# Patient Record
Sex: FEMALE | Race: WHITE | NOT HISPANIC OR LATINO | Employment: OTHER | URBAN - METROPOLITAN AREA
[De-identification: names, ages, dates, MRNs, and addresses within clinical notes are randomized per-mention and may not be internally consistent; named-entity substitution may affect disease eponyms.]

---

## 2017-06-15 ENCOUNTER — GENERIC CONVERSION - ENCOUNTER (OUTPATIENT)
Dept: OTHER | Facility: OTHER | Age: 77
End: 2017-06-15

## 2018-01-16 NOTE — PROGRESS NOTES
Assessment    1  Mixed hyperlipidemia (272 2) (E78 2)   2  Chronic pancreatitis (577 1) (K86 1)   3  DDD (degenerative disc disease), lumbar (722 52) (M51 36)   4  OAB (overactive bladder) (596 51) (N32 81)   5  Tobacco use (305 1) (Z72 0)   6  Weight loss (783 21) (R63 4)   7  Acute URI (465 9) (J06 9)   8  Encounter for preventive health examination (V70 0) (Z00 00)    Plan  Acute URI    · Doxycycline Hyclate 100 MG Oral Capsule; TAKE 1 CAPSULE TWICE DAILY  UNTIL GONE  DDD (degenerative disc disease), lumbar    · Ibuprofen 600 MG Oral Tablet; TAKE 1 TABLET 3 times a day  Health Maintenance    · * MAMMO SCREENING BILATERAL W CAD; Status:Hold For - Scheduling; Requested  for:21Mar2016;   Mixed hyperlipidemia    · Atorvastatin Calcium 20 MG Oral Tablet; TAKE 1 TABLET DAILY AS DIRECTED  Mixed hyperlipidemia, Weight loss    · Follow-up visit in 1 year Evaluation and Treatment  Follow-up  Status: Hold For -  Scheduling  Requested for: 21Mar2016   · (1) CBC/PLT/DIFF; Status:Active; Requested for:21Mar2016;    · (1) COMPREHENSIVE METABOLIC PANEL; Status:Active; Requested for:21Mar2016;    · (1) LIPID PANEL, FASTING; Status:Active; Requested for:21Mar2016;    · (1) TSH; Status:Active; Requested for:21Mar2016;   OAB (overactive bladder)    · VESIcare 5 MG Oral Tablet    Discussion/Summary  health maintenance visit cervical cancer screening is not indicated Breast cancer screening: mammogram has been ordered  Colorectal cancer screening: colorectal cancer screening is current  She will be due for colonoscopy this year  Smoking cessation recommended  Chief Complaint  Patient is here for a yearly check up      History of Present Illness  HM, Adult Female: The patient is being seen for a health maintenance evaluation  The last health maintenance visit was 1 year(s) ago  Social History: Household members include spouse  She is   Work status: not currently employed  The patient is a current cigarette smoker  She is not ready to quit using tobacco  She reports occasional alcohol use  The patient has no concerns about alcohol abuse  She has never used illicit drugs  General Health: The patient's health since the last visit is described as fair  Lifestyle:  She has weight concerns  She uses tobacco  She consumes alcohol  She denies drug use  Reproductive health: the patient is postmenopausal    Screening: cancer screening reviewed and current  metabolic screening reviewed and current  risk screening reviewed and current  HPI: Patient comes in for annual checkup  She has had a three-week history of cough  She has lost 12 pounds last year she does not know why  She stopped taking desiccator because this was not helping her overactive bladder  Review of Systems    Constitutional: No fever, no chills, feels well, no tiredness, no recent weight gain or weight loss  Eyes: No complaints of eye pain, no red eyes, no eyesight problems, no discharge, no dry eyes, no itching of eyes  ENT: nasal discharge  Cardiovascular: No complaints of slow heart rate, no fast heart rate, no chest pain, no palpitations, no leg claudication, no lower extremity edema  Respiratory: cough  Gastrointestinal: abdominal pain  Genitourinary: as noted in HPI  Musculoskeletal: arthralgias  Integumentary: No complaints of skin rash or lesions, no itching, no skin wounds, no breast pain or lump  Psychiatric: Not suicidal, no sleep disturbance, no anxiety or depression, no change in personality, no emotional problems  Endocrine: No complaints of proptosis, no hot flashes, no muscle weakness, no deepening of the voice, no feelings of weakness  Hematologic/Lymphatic: No complaints of swollen glands, no swollen glands in the neck, does not bleed easily, does not bruise easily  Active Problems    1  Allergic rhinitis (477 9) (J30 9)   2  At risk for heart disease (V49 89) (Z91 89)   3  Chronic pain (115 29) (G27 29)   4  Chronic pancreatitis (577 1) (K86 1)   5  DDD (degenerative disc disease), lumbar (722 52) (M51 36)   6  Mixed hyperlipidemia (272 2) (E78 2)   7  OAB (overactive bladder) (596 51) (N32 81)   8  Tobacco use (305 1) (Z72 0)    Past Medical History    · History of Acute pancreatitis (577 0) (K85 9)   · History of Deviated nasal septum (470) (J34 2)   · History of colonoscopy (V45 89) (Z98 89)   · History of mammogram (V15 89) (Z92 89)   · History of Impaired fasting glucose (790 21) (R73 01)   · History of Increased frequency of urination (788 41) (R35 0)   · History of Neck pain (723 1) (M54 2)    Surgical History    · History of Appendectomy   · History of Cholecystectomy   · History of Exploratory Laparotomy   · History of Tubal Ligation    Family History    · Family history of Multiple myeloma    · Family history of CAD (coronary artery disease)   · Family history of CVA (cerebral vascular accident)    Social History    · Alcohol drinker   · Current every day smoker (305 1) (F17 200)   · Tobacco use (305 1) (Z72 0)    Current Meds   1  Atorvastatin Calcium 20 MG Oral Tablet; TAKE 1 TABLET DAILY AS DIRECTED; Therapy: 91LPK3096 to (Evaluate:35Yyq5958)  Requested for: 65ADB1348; Last   Rx:75Efl5493 Ordered   2  Fluticasone Propionate 50 MCG/ACT Nasal Suspension; USE 2 SPRAYS NASALLY   DAILY  Requested for: 23XNO1499; Last Rx:11Aio1540 Ordered   3  Hydrocodone-Acetaminophen 7 5-300 MG Oral Tablet; 1-2 q 6  h prn pain; Therapy: 98WMW6426 to (Last Rx:29Jan2016) Ordered   4  Ibuprofen 600 MG Oral Tablet; TAKE 1 TABLET 3 times a day; Therapy: 05CEG9910 to (Evaluate:16Jun2015)  Requested for: 75NGE4303; Last   Rx:16Hvb8568 Ordered   5  TraMADol HCl - 50 MG Oral Tablet; 2 q 6 h prn pain; Therapy: 24QXF5088 to (Last Rx:06Yvr7905) Ordered   6  VESIcare 5 MG Oral Tablet; 1 DAILY; Therapy: 39ILC4862 to (Last Rx:67Pqa2570)  Requested for: 28BUE1796 Ordered   7   Zenpep 02359 UNIT Oral Capsule Delayed Release Particles; 1 tid with meals; Therapy: 27TKV8584 to (Last Rx:98Pvt6957)  Requested for: 66Gxp3214 Ordered    Allergies    1  morphine    Vitals   Recorded: 21Mar2016 02:07PM   Heart Rate 78   Systolic 348   Diastolic 80   Height 5 ft 6 in   Weight 139 lb    BMI Calculated 22 44   BSA Calculated 1 71     Physical Exam    Constitutional   General appearance: No acute distress, well appearing and well nourished  Head and Face   Head and face: Normal     Palpation of the face and sinuses: No sinus tenderness  Eyes   Conjunctiva and lids: No swelling, erythema or discharge  Pupils and irises: Equal, round, reactive to light  Ears, Nose, Mouth, and Throat   External inspection of ears and nose: Normal     Otoscopic examination: Tympanic membranes translucent with normal light reflex  Canals patent without erythema  Hearing: Normal     Nasal mucosa, septum, and turbinates: Normal without edema or erythema  Lips, teeth, and gums: Normal, good dentition  Oropharynx: Abnormal   The posterior pharynx was erythematous  Neck   Neck: Supple, symmetric, trachea midline, no masses  Thyroid: Normal, no thyromegaly  Pulmonary   Respiratory effort: No increased work of breathing or signs of respiratory distress  Auscultation of lungs: Clear to auscultation  Cardiovascular   Auscultation of heart: Normal rate and rhythm, normal S1 and S2, no murmurs  Carotid pulses: 2+ bilaterally  Abdominal aorta: Normal     Chest   Chest: Normal     Abdomen   Abdomen: Non-tender, no masses  Liver and spleen: No hepatomegaly or splenomegaly  Lymphatic   Palpation of lymph nodes in neck: No lymphadenopathy  Palpation of lymph nodes in other areas: No lymphadenopathy  Musculoskeletal   Gait and station: Normal     Digits and nails: Normal without clubbing or cyanosis      Joints, bones, and muscles: Normal     Range of motion: Normal     Stability: Normal     Muscle strength/tone: Normal     Skin   Skin and subcutaneous tissue: Normal without rashes or lesions  Neurologic   Cranial nerves: Cranial nerves II-XII intact  Cortical function: Normal mental status  Reflexes: 2+ and symmetric  Sensation: No sensory loss  Psychiatric   Judgment and insight: Normal     Orientation to person, place, and time: Normal     Recent and remote memory: Intact  Mood and affect: Normal        Health Management  Health Maintenance   Medicare Annual Wellness Visit; every 1 year; Last 64PCR5820; Next Due: 04BRE1898;   Overdue    Signatures   Electronically signed by : PATRICK Chang ; Mar 21 2016  2:31PM EST                       (Author)

## 2018-02-02 ENCOUNTER — TELEPHONE (OUTPATIENT)
Dept: FAMILY MEDICINE CLINIC | Facility: CLINIC | Age: 78
End: 2018-02-02

## 2018-02-02 DIAGNOSIS — G89.4 CHRONIC PAIN SYNDROME: Primary | ICD-10-CM

## 2018-02-02 RX ORDER — HYDROCODONE BITARTRATE AND ACETAMINOPHEN 7.5; 3 MG/1; MG/1
1 TABLET ORAL EVERY 6 HOURS PRN
Qty: 60 TABLET | Refills: 0 | Status: SHIPPED | OUTPATIENT
Start: 2018-02-02 | End: 2018-03-28 | Stop reason: SDUPTHER

## 2018-03-15 RX ORDER — ATORVASTATIN CALCIUM 20 MG/1
1 TABLET, FILM COATED ORAL DAILY
COMMUNITY
Start: 2014-12-11 | End: 2018-03-20 | Stop reason: SDUPTHER

## 2018-03-15 RX ORDER — FLUTICASONE PROPIONATE 50 MCG
2 SPRAY, SUSPENSION (ML) NASAL DAILY
COMMUNITY
End: 2022-01-01 | Stop reason: CLARIF

## 2018-03-15 RX ORDER — IBUPROFEN 600 MG/1
1 TABLET ORAL 3 TIMES DAILY
COMMUNITY
Start: 2014-12-11 | End: 2018-03-20 | Stop reason: SDUPTHER

## 2018-03-15 RX ORDER — TRAMADOL HYDROCHLORIDE 50 MG/1
TABLET ORAL
COMMUNITY
Start: 2015-05-11 | End: 2018-03-28

## 2018-03-20 ENCOUNTER — OFFICE VISIT (OUTPATIENT)
Dept: FAMILY MEDICINE CLINIC | Facility: CLINIC | Age: 78
End: 2018-03-20
Payer: MEDICARE

## 2018-03-20 VITALS
WEIGHT: 144 LBS | DIASTOLIC BLOOD PRESSURE: 86 MMHG | TEMPERATURE: 98.6 F | HEART RATE: 75 BPM | OXYGEN SATURATION: 96 % | HEIGHT: 67 IN | BODY MASS INDEX: 22.6 KG/M2 | SYSTOLIC BLOOD PRESSURE: 152 MMHG

## 2018-03-20 DIAGNOSIS — K86.1 CHRONIC PANCREATITIS, UNSPECIFIED PANCREATITIS TYPE (HCC): ICD-10-CM

## 2018-03-20 DIAGNOSIS — Z78.0 POST-MENOPAUSE: ICD-10-CM

## 2018-03-20 DIAGNOSIS — Z72.0 TOBACCO USE: ICD-10-CM

## 2018-03-20 DIAGNOSIS — E78.2 MIXED HYPERLIPIDEMIA: ICD-10-CM

## 2018-03-20 DIAGNOSIS — R73.01 IMPAIRED FASTING GLUCOSE: ICD-10-CM

## 2018-03-20 DIAGNOSIS — M51.36 DDD (DEGENERATIVE DISC DISEASE), LUMBAR: ICD-10-CM

## 2018-03-20 DIAGNOSIS — Z00.00 MEDICARE ANNUAL WELLNESS VISIT, SUBSEQUENT: ICD-10-CM

## 2018-03-20 DIAGNOSIS — I10 ESSENTIAL HYPERTENSION: Primary | ICD-10-CM

## 2018-03-20 PROCEDURE — G0439 PPPS, SUBSEQ VISIT: HCPCS | Performed by: FAMILY MEDICINE

## 2018-03-20 RX ORDER — ATORVASTATIN CALCIUM 20 MG/1
20 TABLET, FILM COATED ORAL DAILY
Qty: 90 TABLET | Refills: 3 | Status: SHIPPED | OUTPATIENT
Start: 2018-03-20 | End: 2019-01-01 | Stop reason: SDUPTHER

## 2018-03-20 RX ORDER — LOSARTAN POTASSIUM 50 MG/1
50 TABLET ORAL DAILY
Qty: 90 TABLET | Refills: 3 | Status: SHIPPED | OUTPATIENT
Start: 2018-03-20 | End: 2019-01-01 | Stop reason: SDUPTHER

## 2018-03-20 RX ORDER — IBUPROFEN 600 MG/1
600 TABLET ORAL 3 TIMES DAILY
Qty: 270 TABLET | Refills: 3 | Status: SHIPPED | OUTPATIENT
Start: 2018-03-20 | End: 2019-01-01 | Stop reason: SDUPTHER

## 2018-03-20 NOTE — PROGRESS NOTES
HPI:  Leah Bowen is a 78 y o  female here for her Subsequent Wellness Visit      Patient Active Problem List   Diagnosis    DDD (degenerative disc disease), lumbar    Chronic pancreatitis ([address] )    Mixed hyperlipidemia    Allergic rhinitis    Impaired fasting glucose    Essential hypertension    Tobacco use     Past Medical History:   Diagnosis Date    Deviated nasal septum     H/O colonoscopy     6/7/10    Impaired fasting glucose      Past Surgical History:   Procedure Laterality Date    APPENDECTOMY      CHOLECYSTECTOMY      EXPLORATORY LAPAROTOMY      TUBAL LIGATION       Family History   Problem Relation Age of Onset    Multiple myeloma Mother     Coronary artery disease Father     Stroke Father      CVA     History   Smoking Status    Current Every Day Smoker   Smokeless Tobacco    Never Used     Comment: Tobacco use     History   Alcohol Use    Yes      History   Drug Use No     /86   Pulse 75   Temp 98 6 °F (37 °C)   Ht 5' 7" (1 702 m)   Wt 65 3 kg (144 lb)   SpO2 96%   BMI 22 55 kg/m²       Current Outpatient Prescriptions   Medication Sig Dispense Refill    atorvastatin (LIPITOR) 20 mg tablet Take 1 tablet (20 mg total) by mouth daily 90 tablet 3    fluticasone (FLONASE) 50 mcg/act nasal spray 2 sprays into each nostril daily      ibuprofen (MOTRIN) 600 mg tablet Take 1 tablet (600 mg total) by mouth 3 (three) times a day 270 tablet 3    pancrelipase, Lip-Prot-Amyl, (ZENPEP) 39425 units CPEP capsule Take 1 capsule (15,000 units of lipase total) by mouth 3 (three) times a day with meals 270 capsule 3    traMADol (ULTRAM) 50 mg tablet Take by mouth      HYDROcodone-acetaminophen (XODOL) 7 5-300 MG per tablet Take 1 tablet by mouth every 6 (six) hours as needed for moderate pain Max Daily Amount: 4 tablets 60 tablet 0    losartan (COZAAR) 50 mg tablet Take 1 tablet (50 mg total) by mouth daily 90 tablet 3     No current facility-administered medications for this visit  Allergies   Allergen Reactions    Morphine      Immunization History   Administered Date(s) Administered    Influenza Split High Dose Preservative Free IM 11/01/2016    Pneumococcal Polysaccharide PPV23 11/01/2016       Patient Care Team:  Laine Ayala MD as PCP - General      Medicare Screening Tests and Risk Assessments:  AWV Clinical     ISAR:   Previous hospitalizations?:  No       Once in a Lifetime Medicare Screening:       Medicare Screening Tests and Risk Assessment:   AAA Risk Assessment    Osteoporosis Risk Assessment    HIV Risk Assessment        Drug and Alcohol Use:   Tobacco use    Tobacco use duration    Tobacco Cessation Readiness    Alcohol use    Alcohol Treatment Readiness   Illicit Drug Use        Diet & Exercise:   Diet   What is your diet?:  Regular   How many servings a day of the following:   Exercise    Do you currently exercise?:  yes    Frequency:  daily    Type of exercise:  walking, cardio       Cognitive Impairment Screening:   Depression screening preformed:  No    Cognitive Impairment Screening    Do you have difficulty learning or retaining new information?:  No Do you have difficulty handling new tasks?:  No   Do you have difficulty with reasoning?:  No Do you have difficulty with spatial ability and orientation?:  No   Do you have difficulty with language?:  No Do you have difficulty with behavior?:  No       Functional Ability/Level of Safety:   Hearing    Hearing Impairment Assessment    Current Activities    Help needed with the folllowing:    ADL    Fall Risk   Injury History       Home Safety:   Home Safety Risk Factors       Advanced Directives:   Advanced Directives    Living Will:  No Durable POA for healthcare:   Yes   Advanced directive:  Yes    Patient's End of Life Decisions        Urinary Incontinence:   Do you have urinary incontinence?:  Yes Do you have incomplete emptying?:  No   Do you urinate frequently?:  No Do you have urinary urgency?:  No   Do you have urinary hesitancy?:  No Do you have dysuria (painful and/or difficult urination)?:  No   Do you have nocturia (waking up to urinate)?:  Yes Do you strain when urinating (have to push to urinate)?:  No   Do you have a weak stream when urinating?:  No Do you have intermittent streaming when urinating?:  No   Do you dribble urine after finishing?:  No    Do you have vaginal pressure?:  No Do you have vaginal dryness?:  No       Glaucoma:            Provider Screening     Preventative Screening/Counseling:   Cardiovascular Screening/Counseling:   (Labs Q5 years, EKG optional one-time)   General:  Screening Current           Diabetes Screening/Counseling:   (2 tests/year if Pre-Diabetes or 1 test/year if no Diabetes)   General:  Risks and Benefits Discussed           Colorectal Cancer Screening/Counseling:   (FOBT Q1 yr; Flex Sig Q4 yrs or Q10 yrs after Screening Colonoscopy; Screening Colonoscpy Q2 yrs High Risk or Q10 yrs Low Risk; Barium Enema Q2 yrs High Risk or Q4 yrs Low Risk)   General:  Patient Declines, Risks and Benefits Discussed           Prostate Cancer Screening/Counseling:   (Annual)          Breast Cancer Screening/Counseling:   (Baseline Age 28 - 43; Annual Age 36+)   General:  Screening Current          Cervical Cancer Screening/Counseling:   (Annual for High Risk or Childbearing Age with Abnormal Pap in Last 3 yrs; Every 2 all others)   General:  Screening Not Indicated           Osteoporosis Screening/Counseling:   (Every 2 Yrs if at risk or more if medically necessary)   General:  Risks and Benefits Discussed  Due for: Studies:  DXA Axial         AAA Screening/Counseling:   (Once per Lifetime with risk factors)    General:  Screening Not Indicated           Glaucoma Screening/Counseling:   (Annual)   General:  Screening Current          HIV Screening/Counseling:   (Voluntary;  Once annually for high risk OR 3 times for Pregnancy at diagnosis of IUP; 3rd trimester; and at Labor   General: Screening Not Indicated           Hepatitis C Screening:   Hepatitis C Counseling Provided:  Yes Hepatitis C Screening Accepted: Yes              Immunizations: Other Preventative Couseling (Non-Medicare Wellness Visit Required):       Referrals (Non-Medicare Wellness Visit Required):       Medical Equipment/Suppliers:           No exam data present  Reviewed Updated [facility] Prior Wellness Visits:   Last Medicare wellness visit information was reviewed, patient interviewed , no change since last AWVyes  Last Medicare wellness visit information was reviewed, patient interviewed and updates made to the record today yes    Assessment and Plan:  1  Essential hypertension  losartan (COZAAR) 50 mg tablet   2  Post-menopause  DXA bone density spine hip and pelvis   3  Chronic pancreatitis, unspecified pancreatitis type (HCC)  pancrelipase, Lip-Prot-Amyl, (ZENPEP) 21256 units CPEP capsule    CBC and differential   4  Mixed hyperlipidemia  atorvastatin (LIPITOR) 20 mg tablet    Comprehensive metabolic panel    Lipid panel   5  DDD (degenerative disc disease), lumbar  ibuprofen (MOTRIN) 600 mg tablet   6  Impaired fasting glucose  HEMOGLOBIN A1C W/ EAG ESTIMATION   7   Tobacco use         Health Maintenance Due   Topic Date Due    GLAUCOMA SCREENING 67+ YR  08/08/2009

## 2018-03-20 NOTE — PROGRESS NOTES
Assessment/Plan:           Problem List Items Addressed This Visit     DDD (degenerative disc disease), lumbar    Relevant Medications    ibuprofen (MOTRIN) 600 mg tablet    Chronic pancreatitis (HCC)    Relevant Medications    pancrelipase, Lip-Prot-Amyl, (ZENPEP) 96184 units CPEP capsule    Other Relevant Orders    CBC and differential    Mixed hyperlipidemia    Relevant Medications    atorvastatin (LIPITOR) 20 mg tablet    Other Relevant Orders    Comprehensive metabolic panel    Lipid panel    Impaired fasting glucose    Relevant Orders    HEMOGLOBIN A1C W/ EAG ESTIMATION    Essential hypertension - Primary    Relevant Medications    losartan (COZAAR) 50 mg tablet    Tobacco use      Other Visit Diagnoses     Post-menopause        Relevant Orders    DXA bone density spine hip and pelvis    Medicare annual wellness visit, subsequent                Subjective:      Patient ID: Marlin Lucia is a 78 y o  female  Patient comes in for a checkup  She has not been here in over 1 year  She did not get blood work done requested last year  Medication Refill   Associated symptoms include abdominal pain  The following portions of the patient's history were reviewed and updated as appropriate: allergies, current medications, past family history, past medical history, past social history, past surgical history and problem list     Review of Systems   Constitutional: Negative  HENT: Negative  Eyes: Negative  Respiratory: Negative  Cardiovascular: Negative  Gastrointestinal: Positive for abdominal pain  Endocrine: Negative  Genitourinary: Negative  Musculoskeletal: Negative  Allergic/Immunologic: Negative  Neurological: Negative  Psychiatric/Behavioral: Negative            Objective:      /86   Pulse 75   Temp 98 6 °F (37 °C)   Ht 5' 7" (1 702 m)   Wt 65 3 kg (144 lb)   SpO2 96%   BMI 22 55 kg/m²          Physical Exam   Constitutional: She is oriented to person, place, and time  She appears well-developed  HENT:   Head: Normocephalic and atraumatic  Mouth/Throat: Oropharynx is clear and moist    Eyes: EOM are normal  Pupils are equal, round, and reactive to light  Neck: Neck supple  No thyromegaly present  Cardiovascular: Normal rate, regular rhythm and normal heart sounds  Pulmonary/Chest: Effort normal and breath sounds normal    Abdominal: Soft  Musculoskeletal: Normal range of motion  Lymphadenopathy:     She has no cervical adenopathy  Neurological: She is alert and oriented to person, place, and time  Skin: Skin is warm and dry  Psychiatric: She has a normal mood and affect  Nursing note and vitals reviewed

## 2018-03-28 DIAGNOSIS — G89.4 CHRONIC PAIN SYNDROME: ICD-10-CM

## 2018-03-28 RX ORDER — HYDROCODONE BITARTRATE AND ACETAMINOPHEN 7.5; 3 MG/1; MG/1
1 TABLET ORAL EVERY 6 HOURS PRN
Qty: 60 TABLET | Refills: 0 | Status: SHIPPED | OUTPATIENT
Start: 2018-03-28 | End: 2018-05-01

## 2018-03-30 DIAGNOSIS — K86.1 CHRONIC PANCREATITIS, UNSPECIFIED PANCREATITIS TYPE (HCC): ICD-10-CM

## 2018-04-02 DIAGNOSIS — K86.1 CHRONIC PANCREATITIS, UNSPECIFIED PANCREATITIS TYPE (HCC): ICD-10-CM

## 2018-04-09 ENCOUNTER — HOSPITAL ENCOUNTER (OUTPATIENT)
Dept: BONE DENSITY | Facility: CLINIC | Age: 78
Discharge: HOME | End: 2018-04-09
Payer: MEDICARE

## 2018-04-09 DIAGNOSIS — Z78.0 POST-MENOPAUSE: ICD-10-CM

## 2018-04-09 PROCEDURE — 77080 DXA BONE DENSITY AXIAL: CPT

## 2018-04-10 DIAGNOSIS — M81.0 OSTEOPOROSIS, UNSPECIFIED OSTEOPOROSIS TYPE, UNSPECIFIED PATHOLOGICAL FRACTURE PRESENCE: Primary | ICD-10-CM

## 2018-04-10 RX ORDER — RISEDRONATE SODIUM 35 MG/1
35 TABLET, FILM COATED ORAL
Qty: 12 TABLET | Refills: 5 | Status: SHIPPED | OUTPATIENT
Start: 2018-04-10 | End: 2018-04-12

## 2018-04-11 ENCOUNTER — TELEPHONE (OUTPATIENT)
Dept: FAMILY MEDICINE CLINIC | Facility: CLINIC | Age: 78
End: 2018-04-11

## 2018-04-12 DIAGNOSIS — M81.0 OSTEOPOROSIS, UNSPECIFIED OSTEOPOROSIS TYPE, UNSPECIFIED PATHOLOGICAL FRACTURE PRESENCE: Primary | ICD-10-CM

## 2018-04-12 RX ORDER — ALENDRONATE SODIUM 70 MG/1
70 TABLET ORAL
Qty: 12 TABLET | Refills: 5 | Status: SHIPPED | OUTPATIENT
Start: 2018-04-12 | End: 2019-05-06

## 2018-04-19 LAB — HBA1C MFR BLD HPLC: 5.6 %

## 2018-05-01 ENCOUNTER — OFFICE VISIT (OUTPATIENT)
Dept: FAMILY MEDICINE CLINIC | Facility: CLINIC | Age: 78
End: 2018-05-01
Payer: MEDICARE

## 2018-05-01 VITALS
HEIGHT: 67 IN | SYSTOLIC BLOOD PRESSURE: 138 MMHG | DIASTOLIC BLOOD PRESSURE: 72 MMHG | WEIGHT: 145.4 LBS | HEART RATE: 89 BPM | OXYGEN SATURATION: 95 % | BODY MASS INDEX: 22.82 KG/M2

## 2018-05-01 DIAGNOSIS — I10 ESSENTIAL HYPERTENSION: ICD-10-CM

## 2018-05-01 DIAGNOSIS — K86.1 CHRONIC PANCREATITIS, UNSPECIFIED PANCREATITIS TYPE (HCC): ICD-10-CM

## 2018-05-01 DIAGNOSIS — M81.0 OSTEOPOROSIS, UNSPECIFIED OSTEOPOROSIS TYPE, UNSPECIFIED PATHOLOGICAL FRACTURE PRESENCE: ICD-10-CM

## 2018-05-01 DIAGNOSIS — R73.01 IMPAIRED FASTING GLUCOSE: ICD-10-CM

## 2018-05-01 DIAGNOSIS — Z72.0 TOBACCO USE: ICD-10-CM

## 2018-05-01 DIAGNOSIS — J30.9 ALLERGIC RHINITIS, UNSPECIFIED SEASONALITY, UNSPECIFIED TRIGGER: ICD-10-CM

## 2018-05-01 DIAGNOSIS — E78.2 MIXED HYPERLIPIDEMIA: ICD-10-CM

## 2018-05-01 DIAGNOSIS — M51.36 DDD (DEGENERATIVE DISC DISEASE), LUMBAR: Primary | ICD-10-CM

## 2018-05-01 PROCEDURE — 99214 OFFICE O/P EST MOD 30 MIN: CPT | Performed by: FAMILY MEDICINE

## 2018-05-01 RX ORDER — HYDROCODONE BITARTRATE AND ACETAMINOPHEN 7.5; 325 MG/1; MG/1
TABLET ORAL
Qty: 180 TABLET | Refills: 0 | Status: SHIPPED | OUTPATIENT
Start: 2018-05-01 | End: 2018-11-06 | Stop reason: SDUPTHER

## 2018-05-01 RX ORDER — TRAMADOL HYDROCHLORIDE 50 MG/1
TABLET ORAL
Refills: 0 | COMMUNITY
Start: 2018-04-26 | End: 2018-11-06 | Stop reason: SDUPTHER

## 2018-05-01 NOTE — PROGRESS NOTES
Assessment/Plan:           Problem List Items Addressed This Visit     DDD (degenerative disc disease), lumbar - Primary    Relevant Medications    HYDROcodone-acetaminophen (NORCO) 7 5-325 mg per tablet    Chronic pancreatitis ([address] )    Mixed hyperlipidemia       Continue Lipitor         Allergic rhinitis    Impaired fasting glucose      Low carb diet         Essential hypertension      Continue losartan         Tobacco use      Smoking cessation recommended         Osteoporosis            Subjective:      Patient ID: Naomi Stephens is a 78 y o  female  Patient comes in for a checkup,  Review of her labs and recent DEXA scan  We have started her on  Fosamax for her osteoporosis  She is tolerating this well   she complains of low back pain  The following portions of the patient's history were reviewed and updated as appropriate: allergies, current medications, past family history, past medical history, past social history, past surgical history and problem list     Review of Systems   Constitutional: Negative  HENT: Negative  Respiratory: Negative  Cardiovascular: Negative  Gastrointestinal: Negative  Objective:      /72   Pulse 89   Ht 5' 7" (1 702 m)   Wt 66 kg (145 lb 6 4 oz)   SpO2 95%   BMI 22 77 kg/m²          Physical Exam   Constitutional: She is oriented to person, place, and time  She appears well-developed  HENT:   Head: Normocephalic and atraumatic  Mouth/Throat: Oropharynx is clear and moist    Eyes: EOM are normal  Pupils are equal, round, and reactive to light  Neck: Neck supple  No thyromegaly present  Cardiovascular: Normal rate, regular rhythm and normal heart sounds  Pulmonary/Chest: Effort normal and breath sounds normal    Abdominal: Soft  Musculoskeletal: Normal range of motion  Lymphadenopathy:     She has no cervical adenopathy  Neurological: She is alert and oriented to person, place, and time  Skin: Skin is warm and dry  Psychiatric: She has a normal mood and affect  Nursing note and vitals reviewed

## 2018-05-14 ENCOUNTER — TELEPHONE (OUTPATIENT)
Dept: FAMILY MEDICINE CLINIC | Facility: CLINIC | Age: 78
End: 2018-05-14

## 2018-05-14 NOTE — TELEPHONE ENCOUNTER
Pt is taking Fosamax -  is asking if taking that will have any effect on her having a tooth pulled?- call her or him back

## 2018-11-06 ENCOUNTER — OFFICE VISIT (OUTPATIENT)
Dept: FAMILY MEDICINE CLINIC | Facility: CLINIC | Age: 78
End: 2018-11-06
Payer: MEDICARE

## 2018-11-06 VITALS
BODY MASS INDEX: 22.29 KG/M2 | HEIGHT: 67 IN | RESPIRATION RATE: 14 BRPM | DIASTOLIC BLOOD PRESSURE: 82 MMHG | HEART RATE: 80 BPM | SYSTOLIC BLOOD PRESSURE: 134 MMHG | OXYGEN SATURATION: 96 % | WEIGHT: 142 LBS | TEMPERATURE: 98 F

## 2018-11-06 DIAGNOSIS — I10 ESSENTIAL HYPERTENSION: ICD-10-CM

## 2018-11-06 DIAGNOSIS — K86.1 CHRONIC PANCREATITIS, UNSPECIFIED PANCREATITIS TYPE (HCC): ICD-10-CM

## 2018-11-06 DIAGNOSIS — M51.36 DDD (DEGENERATIVE DISC DISEASE), LUMBAR: ICD-10-CM

## 2018-11-06 DIAGNOSIS — Z23 NEED FOR IMMUNIZATION AGAINST INFLUENZA: ICD-10-CM

## 2018-11-06 DIAGNOSIS — Z72.0 TOBACCO USE: ICD-10-CM

## 2018-11-06 DIAGNOSIS — R73.01 IMPAIRED FASTING GLUCOSE: Primary | ICD-10-CM

## 2018-11-06 DIAGNOSIS — E78.2 MIXED HYPERLIPIDEMIA: ICD-10-CM

## 2018-11-06 DIAGNOSIS — Z23 NEED FOR VACCINATION WITH 13-POLYVALENT PNEUMOCOCCAL CONJUGATE VACCINE: ICD-10-CM

## 2018-11-06 DIAGNOSIS — M81.0 OSTEOPOROSIS, UNSPECIFIED OSTEOPOROSIS TYPE, UNSPECIFIED PATHOLOGICAL FRACTURE PRESENCE: ICD-10-CM

## 2018-11-06 PROCEDURE — G0008 ADMIN INFLUENZA VIRUS VAC: HCPCS

## 2018-11-06 PROCEDURE — G0009 ADMIN PNEUMOCOCCAL VACCINE: HCPCS

## 2018-11-06 PROCEDURE — 90662 IIV NO PRSV INCREASED AG IM: CPT

## 2018-11-06 PROCEDURE — 90670 PCV13 VACCINE IM: CPT

## 2018-11-06 PROCEDURE — 99214 OFFICE O/P EST MOD 30 MIN: CPT | Performed by: FAMILY MEDICINE

## 2018-11-06 RX ORDER — TRAMADOL HYDROCHLORIDE 50 MG/1
TABLET ORAL
Qty: 100 TABLET | Refills: 5 | Status: SHIPPED | OUTPATIENT
Start: 2018-11-06 | End: 2019-05-06 | Stop reason: SDUPTHER

## 2018-11-06 RX ORDER — HYDROCODONE BITARTRATE AND ACETAMINOPHEN 7.5; 325 MG/1; MG/1
TABLET ORAL
Qty: 180 TABLET | Refills: 0 | Status: SHIPPED | OUTPATIENT
Start: 2018-11-06 | End: 2019-05-06 | Stop reason: SDUPTHER

## 2018-11-06 NOTE — PROGRESS NOTES
Assessment/Plan:    Return visit in 6 months with fasting blood work prior to visit  Mammogram and colonoscopy are refused       Problem List Items Addressed This Visit     DDD (degenerative disc disease), lumbar    Relevant Medications    HYDROcodone-acetaminophen (NORCO) 7 5-325 mg per tablet    traMADol (ULTRAM) 50 mg tablet    Chronic pancreatitis (HCC)     Continue Pancrease with meals         Mixed hyperlipidemia     Continue Lipitor 20 mg daily         Relevant Orders    CBC and differential    Comprehensive metabolic panel    Lipid panel    Impaired fasting glucose - Primary     Low carb diet         Relevant Orders    Hemoglobin A1C    Essential hypertension     Continue losartan 50 mg daily         Tobacco use     Smoking cessation recommended         Osteoporosis      Other Visit Diagnoses     Need for immunization against influenza        Relevant Orders    influenza vaccine, [id number], high-dose, PF 0 5 mL, for patients 65 yr+ (FLUZONE HIGH-DOSE) (Completed)    Need for vaccination with 13-polyvalent pneumococcal conjugate vaccine        Relevant Orders    PNEUMOCOCCAL CONJUGATE VACCINE 13-VALENT GREATER THAN 6 MONTHS (Completed)            Subjective:      Patient ID: Naheed Hill is a 70 y o  female  Patient comes in for checkup  She complains of worsening of her low back pain  She is attempting to quit smoking  The following portions of the patient's history were reviewed and updated as appropriate: allergies, current medications, past family history, past medical history, past social history, past surgical history and problem list     Review of Systems   Constitutional: Negative  HENT: Negative  Respiratory: Negative  Cardiovascular: Negative            Objective:      /82   Pulse 80   Temp 98 °F (36 7 °C)   Resp 14   Ht 5' 7" (1 702 m)   Wt 64 4 kg (142 lb)   SpO2 96%   BMI 22 24 kg/m²          Physical Exam   Constitutional: She is oriented to person, place, and time  She appears well-developed  HENT:   Head: Normocephalic and atraumatic  Mouth/Throat: Oropharynx is clear and moist    Eyes: Pupils are equal, round, and reactive to light  EOM are normal    Neck: Neck supple  Cardiovascular: Normal rate, regular rhythm and normal heart sounds  Pulmonary/Chest: Effort normal and breath sounds normal    Abdominal: Soft  Musculoskeletal: Normal range of motion  Neurological: She is alert and oriented to person, place, and time  Skin: Skin is warm and dry  Psychiatric: She has a normal mood and affect

## 2019-01-01 DIAGNOSIS — E78.2 MIXED HYPERLIPIDEMIA: ICD-10-CM

## 2019-01-01 DIAGNOSIS — M51.36 DDD (DEGENERATIVE DISC DISEASE), LUMBAR: ICD-10-CM

## 2019-01-01 DIAGNOSIS — I10 ESSENTIAL HYPERTENSION: ICD-10-CM

## 2019-01-02 RX ORDER — LOSARTAN POTASSIUM 50 MG/1
TABLET ORAL
Qty: 90 TABLET | Refills: 3 | Status: SHIPPED | OUTPATIENT
Start: 2019-01-02 | End: 2020-04-17 | Stop reason: SDUPTHER

## 2019-01-02 RX ORDER — ATORVASTATIN CALCIUM 20 MG/1
TABLET, FILM COATED ORAL
Qty: 90 TABLET | Refills: 3 | Status: SHIPPED | OUTPATIENT
Start: 2019-01-02 | End: 2020-04-17 | Stop reason: SDUPTHER

## 2019-01-02 RX ORDER — IBUPROFEN 600 MG/1
TABLET ORAL
Qty: 270 TABLET | Refills: 3 | Status: SHIPPED | OUTPATIENT
Start: 2019-01-02 | End: 2019-11-05 | Stop reason: SDUPTHER

## 2019-04-16 LAB — HBA1C MFR BLD HPLC: 5.8 %

## 2019-05-06 ENCOUNTER — OFFICE VISIT (OUTPATIENT)
Dept: FAMILY MEDICINE CLINIC | Facility: CLINIC | Age: 79
End: 2019-05-06
Payer: MEDICARE

## 2019-05-06 VITALS
BODY MASS INDEX: 22.91 KG/M2 | WEIGHT: 146 LBS | RESPIRATION RATE: 16 BRPM | HEART RATE: 93 BPM | TEMPERATURE: 97.5 F | HEIGHT: 67 IN | DIASTOLIC BLOOD PRESSURE: 66 MMHG | SYSTOLIC BLOOD PRESSURE: 114 MMHG

## 2019-05-06 DIAGNOSIS — M81.0 OSTEOPOROSIS, UNSPECIFIED OSTEOPOROSIS TYPE, UNSPECIFIED PATHOLOGICAL FRACTURE PRESENCE: ICD-10-CM

## 2019-05-06 DIAGNOSIS — Z00.00 MEDICARE ANNUAL WELLNESS VISIT, SUBSEQUENT: Primary | ICD-10-CM

## 2019-05-06 DIAGNOSIS — R73.01 IMPAIRED FASTING GLUCOSE: ICD-10-CM

## 2019-05-06 DIAGNOSIS — E78.2 MIXED HYPERLIPIDEMIA: ICD-10-CM

## 2019-05-06 DIAGNOSIS — Z72.0 TOBACCO USE: ICD-10-CM

## 2019-05-06 DIAGNOSIS — K86.1 CHRONIC PANCREATITIS, UNSPECIFIED PANCREATITIS TYPE (HCC): ICD-10-CM

## 2019-05-06 DIAGNOSIS — I10 ESSENTIAL HYPERTENSION: ICD-10-CM

## 2019-05-06 DIAGNOSIS — M51.36 DDD (DEGENERATIVE DISC DISEASE), LUMBAR: ICD-10-CM

## 2019-05-06 PROCEDURE — 99214 OFFICE O/P EST MOD 30 MIN: CPT | Performed by: FAMILY MEDICINE

## 2019-05-06 PROCEDURE — G0439 PPPS, SUBSEQ VISIT: HCPCS | Performed by: FAMILY MEDICINE

## 2019-05-06 RX ORDER — TRAMADOL HYDROCHLORIDE 50 MG/1
TABLET ORAL
Qty: 100 TABLET | Refills: 5 | Status: SHIPPED | OUTPATIENT
Start: 2019-05-06 | End: 2019-11-05 | Stop reason: SDUPTHER

## 2019-05-06 RX ORDER — HYDROCODONE BITARTRATE AND ACETAMINOPHEN 7.5; 325 MG/1; MG/1
TABLET ORAL
Qty: 180 TABLET | Refills: 0 | Status: SHIPPED | OUTPATIENT
Start: 2019-05-06 | End: 2019-08-09 | Stop reason: SDUPTHER

## 2019-08-09 DIAGNOSIS — M51.36 DDD (DEGENERATIVE DISC DISEASE), LUMBAR: ICD-10-CM

## 2019-08-09 RX ORDER — HYDROCODONE BITARTRATE AND ACETAMINOPHEN 7.5; 325 MG/1; MG/1
TABLET ORAL
Qty: 180 TABLET | Refills: 0 | Status: SHIPPED | OUTPATIENT
Start: 2019-08-09 | End: 2019-11-05 | Stop reason: SDUPTHER

## 2019-08-09 NOTE — TELEPHONE ENCOUNTER
rf Hydrocodone   PDMP  05/06/2019  1  05/06/2019  HYDROCODONE-ACETAMIN 7 5-325  180 0  30     [facility]

## 2019-11-05 ENCOUNTER — OFFICE VISIT (OUTPATIENT)
Dept: FAMILY MEDICINE CLINIC | Facility: CLINIC | Age: 79
End: 2019-11-05
Payer: MEDICARE

## 2019-11-05 VITALS
DIASTOLIC BLOOD PRESSURE: 70 MMHG | SYSTOLIC BLOOD PRESSURE: 112 MMHG | TEMPERATURE: 98.4 F | OXYGEN SATURATION: 97 % | RESPIRATION RATE: 16 BRPM | HEART RATE: 84 BPM | HEIGHT: 67 IN | WEIGHT: 149 LBS | BODY MASS INDEX: 23.39 KG/M2

## 2019-11-05 DIAGNOSIS — M51.36 DDD (DEGENERATIVE DISC DISEASE), LUMBAR: Primary | ICD-10-CM

## 2019-11-05 DIAGNOSIS — Z72.0 TOBACCO USE: ICD-10-CM

## 2019-11-05 DIAGNOSIS — K86.1 CHRONIC PANCREATITIS, UNSPECIFIED PANCREATITIS TYPE (HCC): ICD-10-CM

## 2019-11-05 DIAGNOSIS — I10 ESSENTIAL HYPERTENSION: ICD-10-CM

## 2019-11-05 DIAGNOSIS — Z23 NEED FOR INFLUENZA VACCINATION: ICD-10-CM

## 2019-11-05 DIAGNOSIS — M81.0 OSTEOPOROSIS, UNSPECIFIED OSTEOPOROSIS TYPE, UNSPECIFIED PATHOLOGICAL FRACTURE PRESENCE: ICD-10-CM

## 2019-11-05 DIAGNOSIS — E78.2 MIXED HYPERLIPIDEMIA: ICD-10-CM

## 2019-11-05 DIAGNOSIS — R73.01 IMPAIRED FASTING GLUCOSE: ICD-10-CM

## 2019-11-05 PROCEDURE — 99214 OFFICE O/P EST MOD 30 MIN: CPT | Performed by: FAMILY MEDICINE

## 2019-11-05 PROCEDURE — G0008 ADMIN INFLUENZA VIRUS VAC: HCPCS | Performed by: FAMILY MEDICINE

## 2019-11-05 PROCEDURE — 90662 IIV NO PRSV INCREASED AG IM: CPT | Performed by: FAMILY MEDICINE

## 2019-11-05 RX ORDER — IBUPROFEN 600 MG/1
600 TABLET ORAL 3 TIMES DAILY
Qty: 270 TABLET | Refills: 3 | Status: SHIPPED | OUTPATIENT
Start: 2019-11-05 | End: 2020-11-10

## 2019-11-05 RX ORDER — HYDROCODONE BITARTRATE AND ACETAMINOPHEN 7.5; 325 MG/1; MG/1
TABLET ORAL
Qty: 180 TABLET | Refills: 0 | Status: SHIPPED | OUTPATIENT
Start: 2019-11-05 | End: 2020-03-20 | Stop reason: SDUPTHER

## 2019-11-05 RX ORDER — TRAMADOL HYDROCHLORIDE 50 MG/1
TABLET ORAL
Qty: 100 TABLET | Refills: 5 | Status: SHIPPED | OUTPATIENT
Start: 2019-11-05 | End: 2020-05-14 | Stop reason: SDUPTHER

## 2019-11-05 NOTE — PROGRESS NOTES
Assessment/Plan:       Diagnoses and all orders for this visit:    DDD (degenerative disc disease), lumbar  -     ibuprofen (MOTRIN) 600 mg tablet; Take 1 tablet (600 mg total) by mouth 3 (three) times a day  -     Ambulatory referral to Orthopedic Surgery; Future  -     HYDROcodone-acetaminophen (NORCO) 7 5-325 mg per tablet; 2  Tid for pain  -     traMADol (ULTRAM) 50 mg tablet; 1-2 q 6 prn pain    Need for influenza vaccination  -     influenza vaccine, 2066-8821, high-dose, PF 0 5 mL (FLUZONE HIGH-DOSE)    Chronic pancreatitis, unspecified pancreatitis type (HCC)    Impaired fasting glucose  -     Hemoglobin A1C; Future    Essential hypertension    Mixed hyperlipidemia  -     CBC and differential; Future  -     Comprehensive metabolic panel; Future  -     Lipid panel; Future    Osteoporosis, unspecified osteoporosis type, unspecified pathological fracture presence    Tobacco use        Impaired fasting glucose  Low carb diet    Chronic pancreatitis (HCC)  Continue Pancrease t i d  with meals    Mixed hyperlipidemia  Continue atorvastatin 20 mg daily    Essential hypertension  Continue losartan 50 mg daily        Subjective:      Patient ID: Mary Regalado is a 70 y o  female  Patient comes in for checkup  She complains of worsening of her low back pain  The following portions of the patient's history were reviewed and updated as appropriate:   She has a past medical history of Deviated nasal septum, H/O colonoscopy, and Impaired fasting glucose ,  does not have any pertinent problems on file  ,   has a past surgical history that includes Appendectomy; Cholecystectomy; Exploratory laparotomy; and Tubal ligation  ,  family history includes Coronary artery disease in her father; Multiple myeloma in her mother; Stroke in her father  ,   reports that she has been smoking  She has never used smokeless tobacco  She reports that she drinks alcohol  She reports that she does not use drugs  ,  is allergic to morphine     Current Outpatient Medications   Medication Sig Dispense Refill    atorvastatin (LIPITOR) 20 mg tablet TAKE 1 TABLET EVERY DAY 90 tablet 3    fluticasone (FLONASE) 50 mcg/act nasal spray 2 sprays into each nostril daily      HYDROcodone-acetaminophen (NORCO) 7 5-325 mg per tablet 2  Tid for pain 180 tablet 0    ibuprofen (MOTRIN) 600 mg tablet Take 1 tablet (600 mg total) by mouth 3 (three) times a day 270 tablet 3    losartan (COZAAR) 50 mg tablet TAKE 1 TABLET EVERY DAY 90 tablet 3    pancrelipase, Lip-Prot-Amyl, (ZENPEP) 66058 units CPEP capsule Take 1 capsule (15,000 units of lipase total) by mouth 3 (three) times a day with meals 270 capsule 5    traMADol (ULTRAM) 50 mg tablet 1-2 q 6 prn pain 100 tablet 5     No current facility-administered medications for this visit  Review of Systems   Constitutional: Negative  Respiratory: Negative  Cardiovascular: Negative  Musculoskeletal: Positive for back pain  Objective:  Vitals:    11/05/19 1305   BP: 112/70   Pulse: 84   Resp: 16   Temp: 98 4 °F (36 9 °C)   SpO2: 97%   Weight: 67 6 kg (149 lb)   Height: 5' 7" (1 702 m)     Body mass index is 23 34 kg/m²  Physical Exam   Constitutional: She is oriented to person, place, and time  She appears well-developed  HENT:   Head: Normocephalic and atraumatic  Right Ear: Tympanic membrane and external ear normal    Left Ear: Tympanic membrane and external ear normal    Mouth/Throat: Oropharynx is clear and moist    Eyes: Pupils are equal, round, and reactive to light  EOM are normal    Neck: Neck supple  No thyromegaly present  Cardiovascular: Normal rate, regular rhythm and normal heart sounds  Pulmonary/Chest: Effort normal and breath sounds normal    Abdominal: Soft  Musculoskeletal: Normal range of motion  Lymphadenopathy:     She has no cervical adenopathy  Neurological: She is alert and oriented to person, place, and time  Skin: Skin is warm and dry  Psychiatric: She has a normal mood and affect

## 2020-03-20 ENCOUNTER — TELEPHONE (OUTPATIENT)
Dept: FAMILY MEDICINE CLINIC | Facility: CLINIC | Age: 80
End: 2020-03-20

## 2020-03-20 DIAGNOSIS — M51.36 DDD (DEGENERATIVE DISC DISEASE), LUMBAR: ICD-10-CM

## 2020-03-20 RX ORDER — HYDROCODONE BITARTRATE AND ACETAMINOPHEN 7.5; 325 MG/1; MG/1
TABLET ORAL
Qty: 180 TABLET | Refills: 0 | Status: SHIPPED | OUTPATIENT
Start: 2020-03-20 | End: 2020-07-22 | Stop reason: SDUPTHER

## 2020-04-17 DIAGNOSIS — E78.2 MIXED HYPERLIPIDEMIA: ICD-10-CM

## 2020-04-17 DIAGNOSIS — I10 ESSENTIAL HYPERTENSION: ICD-10-CM

## 2020-04-17 RX ORDER — ATORVASTATIN CALCIUM 20 MG/1
20 TABLET, FILM COATED ORAL DAILY
Qty: 90 TABLET | Refills: 3 | Status: SHIPPED | OUTPATIENT
Start: 2020-04-17 | End: 2021-05-11

## 2020-04-17 RX ORDER — LOSARTAN POTASSIUM 50 MG/1
50 TABLET ORAL DAILY
Qty: 90 TABLET | Refills: 3 | Status: SHIPPED | OUTPATIENT
Start: 2020-04-17 | End: 2021-05-11

## 2020-05-14 DIAGNOSIS — M51.36 DDD (DEGENERATIVE DISC DISEASE), LUMBAR: ICD-10-CM

## 2020-05-14 RX ORDER — TRAMADOL HYDROCHLORIDE 50 MG/1
TABLET ORAL
Qty: 100 TABLET | Refills: 5 | Status: SHIPPED | OUTPATIENT
Start: 2020-05-14 | End: 2020-10-12 | Stop reason: SDUPTHER

## 2020-07-22 DIAGNOSIS — M51.36 DDD (DEGENERATIVE DISC DISEASE), LUMBAR: ICD-10-CM

## 2020-07-22 NOTE — TELEPHONE ENCOUNTER
Medication:  PDMP reviewed  Active agreement on file -No      07/17/2020  1  05/14/2020  TRAMADOL HCL 50 MG TABLET  100 0  13  [facility] SH[facility]LY  [id number]  [facility] (2169)  2  38 46 MME  [place]    06/15/2020  1  05/14/2020  TRAMADOL HCL 50 MG TABLET  100 0  13  [facility] SH[facility]LY  [id number]  [facility] (2169)  1  38 46 MME  [place]    05/14/2020  1  05/14/2020  TRAMADOL HCL 50 MG TABLET  100 0  13  [facility]  [id number]  [facility] (2169)  0  38 46 MME  [place]    04/27/2020  1  11/05/2019  TRAMADOL HCL 50 MG TABLET  100 0  13  [facility]  [id number]  [facility] (2169)  5  38 46 MME  [place]    03/23/2020  1  03/20/2020  HYDROCODONE-ACETAMIN 7 5-325  180 0  30  [facility] SH[facility]  [id number]  [facility] (2169)  0  45  0 MME  [place]    03/17/2020  1  11/05/2019  TRAMADOL HCL 50 MG TABLET  100 0  13  [facility]  [id number]  [facility] (2169)  4  38 46 MME  [place]    02

## 2020-07-23 RX ORDER — HYDROCODONE BITARTRATE AND ACETAMINOPHEN 7.5; 325 MG/1; MG/1
TABLET ORAL
Qty: 180 TABLET | Refills: 0 | Status: SHIPPED | OUTPATIENT
Start: 2020-07-23 | End: 2020-10-12 | Stop reason: SDUPTHER

## 2020-08-24 ENCOUNTER — OFFICE VISIT (OUTPATIENT)
Dept: FAMILY MEDICINE CLINIC | Facility: CLINIC | Age: 80
End: 2020-08-24
Payer: MEDICARE

## 2020-08-24 VITALS
DIASTOLIC BLOOD PRESSURE: 70 MMHG | BODY MASS INDEX: 22.6 KG/M2 | SYSTOLIC BLOOD PRESSURE: 126 MMHG | TEMPERATURE: 97.2 F | HEIGHT: 67 IN | WEIGHT: 144 LBS | HEART RATE: 60 BPM | OXYGEN SATURATION: 95 %

## 2020-08-24 DIAGNOSIS — Z72.0 TOBACCO USE: ICD-10-CM

## 2020-08-24 DIAGNOSIS — Z00.00 MEDICARE ANNUAL WELLNESS VISIT, INITIAL: Primary | ICD-10-CM

## 2020-08-24 DIAGNOSIS — Z12.39 BREAST CANCER SCREENING: ICD-10-CM

## 2020-08-24 DIAGNOSIS — Z12.11 COLON CANCER SCREENING: ICD-10-CM

## 2020-08-24 DIAGNOSIS — E78.2 MIXED HYPERLIPIDEMIA: ICD-10-CM

## 2020-08-24 DIAGNOSIS — M51.36 DDD (DEGENERATIVE DISC DISEASE), LUMBAR: ICD-10-CM

## 2020-08-24 DIAGNOSIS — R73.01 IMPAIRED FASTING GLUCOSE: ICD-10-CM

## 2020-08-24 DIAGNOSIS — M81.0 OSTEOPOROSIS, UNSPECIFIED OSTEOPOROSIS TYPE, UNSPECIFIED PATHOLOGICAL FRACTURE PRESENCE: ICD-10-CM

## 2020-08-24 DIAGNOSIS — I10 ESSENTIAL HYPERTENSION: ICD-10-CM

## 2020-08-24 DIAGNOSIS — K86.1 CHRONIC PANCREATITIS, UNSPECIFIED PANCREATITIS TYPE (HCC): ICD-10-CM

## 2020-08-24 PROCEDURE — G0438 PPPS, INITIAL VISIT: HCPCS | Performed by: FAMILY MEDICINE

## 2020-08-24 PROCEDURE — 3078F DIAST BP <80 MM HG: CPT | Performed by: FAMILY MEDICINE

## 2020-08-24 PROCEDURE — 1170F FXNL STATUS ASSESSED: CPT | Performed by: FAMILY MEDICINE

## 2020-08-24 PROCEDURE — 1160F RVW MEDS BY RX/DR IN RCRD: CPT | Performed by: FAMILY MEDICINE

## 2020-08-24 PROCEDURE — 1125F AMNT PAIN NOTED PAIN PRSNT: CPT | Performed by: FAMILY MEDICINE

## 2020-08-24 PROCEDURE — 3074F SYST BP LT 130 MM HG: CPT | Performed by: FAMILY MEDICINE

## 2020-08-24 PROCEDURE — 3008F BODY MASS INDEX DOCD: CPT | Performed by: FAMILY MEDICINE

## 2020-08-24 PROCEDURE — 4004F PT TOBACCO SCREEN RCVD TLK: CPT | Performed by: FAMILY MEDICINE

## 2020-08-24 PROCEDURE — 4040F PNEUMOC VAC/ADMIN/RCVD: CPT | Performed by: FAMILY MEDICINE

## 2020-08-24 PROCEDURE — 99214 OFFICE O/P EST MOD 30 MIN: CPT | Performed by: FAMILY MEDICINE

## 2020-08-24 NOTE — PROGRESS NOTES
Assessment and Plan:     Problem List Items Addressed This Visit     None      Visit Diagnoses     Medicare annual wellness visit, initial    -  Primary           Preventive health issues were discussed with patient, and age appropriate screening tests were ordered as noted in patient's After Visit Summary  Personalized health advice and appropriate referrals for health education or preventive services given if needed, as noted in patient's After Visit Summary       History of Present Illness:     Patient presents for Medicare Annual Wellness visit    Patient Care Team:  Sarah Mcnamara MD as PCP - General     Problem List:     Patient Active Problem List   Diagnosis    DDD (degenerative disc disease), lumbar    Chronic pancreatitis ([address] )    Mixed hyperlipidemia    Allergic rhinitis    Impaired fasting glucose    Essential hypertension    Tobacco use    Osteoporosis      Past Medical and Surgical History:     Past Medical History:   Diagnosis Date    Deviated nasal septum     H/O colonoscopy     6/7/10    Impaired fasting glucose      Past Surgical History:   Procedure Laterality Date    APPENDECTOMY      CHOLECYSTECTOMY      EXPLORATORY LAPAROTOMY      TUBAL LIGATION        Family History:     Family History   Problem Relation Age of Onset    Multiple myeloma Mother     Coronary artery disease Father     Stroke Father         CVA      Social History:        Social History     Socioeconomic History    Marital status: /Civil Union     Spouse name: None    Number of children: None    Years of education: None    Highest education level: None   Occupational History    None   Social Needs    Financial resource strain: None    Food insecurity     Worry: None     Inability: None    Transportation needs     Medical: None     Non-medical: None   Tobacco Use    Smoking status: Current Every Day Smoker    Smokeless tobacco: Never Used    Tobacco comment: Tobacco use   Substance and Sexual Activity    Alcohol use: Yes    Drug use: No    Sexual activity: None   Lifestyle    Physical activity     Days per week: None     Minutes per session: None    Stress: None   Relationships    Social connections     Talks on phone: None     Gets together: None     Attends Christian service: None     Active member of club or organization: None     Attends meetings of clubs or organizations: None     Relationship status: None    Intimate partner violence     Fear of current or ex partner: None     Emotionally abused: None     Physically abused: None     Forced sexual activity: None   Other Topics Concern    None   Social History Narrative    None      Medications and Allergies:     Current Outpatient Medications   Medication Sig Dispense Refill    atorvastatin (LIPITOR) 20 mg tablet Take 1 tablet (20 mg total) by mouth daily 90 tablet 3    fluticasone (FLONASE) 50 mcg/act nasal spray 2 sprays into each nostril daily      HYDROcodone-acetaminophen (NORCO) 7 5-325 mg per tablet 2  Tid for pain 180 tablet 0    losartan (COZAAR) 50 mg tablet Take 1 tablet (50 mg total) by mouth daily 90 tablet 3    pancrelipase, Lip-Prot-Amyl, (ZENPEP) 85819 units CPEP capsule Take 1 capsule (15,000 units of lipase total) by mouth 3 (three) times a day with meals 270 capsule 5    traMADol (ULTRAM) 50 mg tablet 1-2 q 6 prn pain 100 tablet 5    ibuprofen (MOTRIN) 600 mg tablet Take 1 tablet (600 mg total) by mouth 3 (three) times a day (Patient not taking: Reported on 8/24/2020) 270 tablet 3     No current facility-administered medications for this visit        Allergies   Allergen Reactions    Morphine       Immunizations:     Immunization History   Administered Date(s) Administered    INFLUENZA 11/06/2018    Influenza Split High Dose Preservative Free IM 11/01/2016    Influenza, high dose seasonal 0 7 mL 11/06/2018, 11/05/2019    Pneumococcal Conjugate 13-Valent 11/06/2018    Pneumococcal Polysaccharide PPV23 11/01/2016      Health Maintenance: There are no preventive care reminders to display for this patient  Topic Date Due    Influenza Vaccine  07/01/2020      Medicare Health Risk Assessment:     /70 (BP Location: Left arm, Patient Position: Sitting)   Pulse 60   Temp (!) 97 2 °F (36 2 °C) (Tympanic)   Ht 5' 7" (1 702 m)   Wt 65 3 kg (144 lb)   SpO2 95%   BMI 22 55 kg/m²      Osman Lieberman is here for her Subsequent Wellness visit  Last Medicare Wellness visit information reviewed, patient interviewed and updates made to the record today  Health Risk Assessment:   Patient rates overall health as fair  Patient feels that their physical health rating is slightly worse  Eyesight was rated as same  Hearing was rated as same  Patient feels that their emotional and mental health rating is slightly worse  Pain experienced in the last 7 days has been some  Patient's pain rating has been 9/10  Patient states that she has experienced no weight loss or gain in last 6 months  Depression Screening:   PHQ-2 Score: 0      Fall Risk Screening: In the past year, patient has experienced: no history of falling in past year      Urinary Incontinence Screening:   Patient has not leaked urine accidently in the last six months  Home Safety:  Patient does not have trouble with stairs inside or outside of their home  Patient has working smoke alarms and has no working carbon monoxide detector  Home safety hazards include: none  Nutrition:   Current diet is Regular  Medications:   Patient is currently taking over-the-counter supplements  OTC medications include: see medication list  Patient is able to manage medications  Activities of Daily Living (ADLs)/Instrumental Activities of Daily Living (IADLs):   Walk and transfer into and out of bed and chair?: Yes  Dress and groom yourself?: Yes    Bathe or shower yourself?: Yes    Feed yourself?  Yes  Do your laundry/housekeeping?: Yes  Manage your money, pay your bills and track your expenses?: Yes  Make your own meals?: Yes    Do your own shopping?: Yes    Previous Hospitalizations:   Any hospitalizations or ED visits within the last 12 months?: No      Advance Care Planning:   Living will: Yes    Durable POA for healthcare: Yes    Advanced directive: Yes    Advanced directive counseling given: Yes    End of Life Decisions reviewed with patient: Yes    Provider agrees with end of life decisions: Yes      PREVENTIVE SCREENINGS      Cardiovascular Screening:    General: Screening Not Indicated and History Lipid Disorder      Diabetes Screening:     General: Risks and Benefits Discussed    Due for: Blood Glucose      Colorectal Cancer Screening:     General: Risks and Benefits Discussed    Due for: Cologuard      Breast Cancer Screening:     General: Risks and Benefits Discussed    Due for: Mammogram        Cervical Cancer Screening:    General: Screening Not Indicated      Osteoporosis Screening:    General: Screening Not Indicated and History Osteoporosis      Abdominal Aortic Aneurysm (AAA) Screening:        General: Risks and Benefits Discussed    Due for: Screening AAA Ultrasound      Lung Cancer Screening:     General: Screening Not Indicated      Hepatitis C Screening:    General: Screening Current    Other Counseling Topics:   Car/seat belt/driving safety         Benjamin Griggs MD

## 2020-08-24 NOTE — PROGRESS NOTES
Assessment/Plan:    Return visit in 6 months  We will call regarding results of her testing  Problem List Items Addressed This Visit        Digestive    Chronic pancreatitis ([address] )     Continue pancreatic enzymes            Endocrine    Impaired fasting glucose     Low carb diet         Relevant Orders    Hemoglobin A1C       Cardiovascular and Mediastinum    Essential hypertension     Continue losartan 50 milligrams daily            Musculoskeletal and Integument    DDD (degenerative disc disease), lumbar    Osteoporosis       Other    Mixed hyperlipidemia     Continue Lipitor 20 milligrams daily         Relevant Orders    CBC and differential    Comprehensive metabolic panel    Lipid panel    Tobacco use     Smoking cessation recommended         Relevant Orders    US abdominal aorta screening aaa      Other Visit Diagnoses     Medicare annual wellness visit, initial    -  Primary    Colon cancer screening        Relevant Orders    Cologuard    Breast cancer screening        Relevant Orders    Mammo screening bilateral w 3d & cad            Subjective:      Patient ID: Jose M Peng is a 68 y o  female  HPI    The following portions of the patient's history were reviewed and updated as appropriate:   She has a past medical history of Deviated nasal septum, H/O colonoscopy, and Impaired fasting glucose ,  does not have any pertinent problems on file  ,   has a past surgical history that includes Appendectomy; Cholecystectomy; Exploratory laparotomy; and Tubal ligation  ,  family history includes Coronary artery disease in her father; Multiple myeloma in her mother; Stroke in her father  ,   reports that she has been smoking  She has never used smokeless tobacco  She reports current alcohol use  She reports that she does not use drugs  ,  is allergic to morphine     Current Outpatient Medications   Medication Sig Dispense Refill    atorvastatin (LIPITOR) 20 mg tablet Take 1 tablet (20 mg total) by mouth daily 90 tablet 3    fluticasone (FLONASE) 50 mcg/act nasal spray 2 sprays into each nostril daily      HYDROcodone-acetaminophen (NORCO) 7 5-325 mg per tablet 2  Tid for pain 180 tablet 0    losartan (COZAAR) 50 mg tablet Take 1 tablet (50 mg total) by mouth daily 90 tablet 3    pancrelipase, Lip-Prot-Amyl, (ZENPEP) 91332 units CPEP capsule Take 1 capsule (15,000 units of lipase total) by mouth 3 (three) times a day with meals 270 capsule 5    traMADol (ULTRAM) 50 mg tablet 1-2 q 6 prn pain 100 tablet 5    ibuprofen (MOTRIN) 600 mg tablet Take 1 tablet (600 mg total) by mouth 3 (three) times a day (Patient not taking: Reported on 8/24/2020) 270 tablet 3     No current facility-administered medications for this visit  Review of Systems      Objective:  Vitals:    08/24/20 1322   BP: 126/70   BP Location: Left arm   Patient Position: Sitting   Pulse: 60   Temp: (!) 97 2 °F (36 2 °C)   TempSrc: Tympanic   SpO2: 95%   Weight: 65 3 kg (144 lb)   Height: 5' 7" (1 702 m)     Body mass index is 22 55 kg/m²       Physical Exam

## 2020-08-24 NOTE — PATIENT INSTRUCTIONS
Medicare Preventive Visit Patient Instructions  Thank you for completing your Welcome to Medicare Visit or Medicare Annual Wellness Visit today  Your next wellness visit will be due in one year (8/24/2021)  The screening/preventive services that you may require over the next 5-10 years are detailed below  Some tests may not apply to you based off risk factors and/or age  Screening tests ordered at today's visit but not completed yet may show as past due  Also, please note that scanned in results may not display below  Preventive Screenings:  Service Recommendations Previous Testing/Comments   Colorectal Cancer Screening  * Colonoscopy    * Fecal Occult Blood Test (FOBT)/Fecal Immunochemical Test (FIT)  * Fecal DNA/Cologuard Test  * Flexible Sigmoidoscopy Age: 54-65 years old   Colonoscopy: every 10 years (may be performed more frequently if at higher risk)  OR  FOBT/FIT: every 1 year  OR  Cologuard: every 3 years  OR  Sigmoidoscopy: every 5 years  Screening may be recommended earlier than age 48 if at higher risk for colorectal cancer  Also, an individualized decision between you and your healthcare provider will decide whether screening between the ages of 74-80 would be appropriate  Colonoscopy: 06/07/2010  FOBT/FIT: Not on file  Cologuard: Not on file  Sigmoidoscopy: Not on file         Breast Cancer Screening Age: 36 years old  Frequency: every 1-2 years  Not required if history of left and right mastectomy Mammogram: Not on file       Cervical Cancer Screening Between the ages of 21-29, pap smear recommended once every 3 years  Between the ages of 33-67, can perform pap smear with HPV co-testing every 5 years     Recommendations may differ for women with a history of total hysterectomy, cervical cancer, or abnormal pap smears in past  Pap Smear: Not on file    Screening Not Indicated   Hepatitis C Screening Once for adults born between [facility]  More frequently in patients at high risk for Hepatitis C Hep C Antibody: Not on file       Diabetes Screening 1-2 times per year if you're at risk for diabetes or have pre-diabetes Fasting glucose: No results in last 5 years   A1C: 5 8       Cholesterol Screening Once every 5 years if you don't have a lipid disorder  May order more often based on risk factors  Lipid panel: Not on file    Screening Not Indicated  History Lipid Disorder     Other Preventive Screenings Covered by Medicare:  1  Abdominal Aortic Aneurysm (AAA) Screening: covered once if your at risk  You're considered to be at risk if you have a family history of AAA  2  Lung Cancer Screening: covers low dose CT scan once per year if you meet all of the following conditions: (1) Age 50-69; (2) No signs or symptoms of lung cancer; (3) Current smoker or have quit smoking within the last 15 years; (4) You have a tobacco smoking history of at least 30 pack years (packs per day multiplied by number of years you smoked); (5) You get a written order from a healthcare provider  3  Glaucoma Screening: covered annually if you're considered high risk: (1) You have diabetes OR (2) Family history of glaucoma OR (3)  aged 48 and older OR (3)  American aged 72 and older  3  Osteoporosis Screening: covered every 2 years if you meet one of the following conditions: (1) You're estrogen deficient and at risk for osteoporosis based off medical history and other findings; (2) Have a vertebral abnormality; (3) On glucocorticoid therapy for more than 3 months; (4) Have primary hyperparathyroidism; (5) On osteoporosis medications and need to assess response to drug therapy  · Last bone density test (DXA Scan): 04/09/2018  5  HIV Screening: covered annually if you're between the age of 12-76  Also covered annually if you are younger than 13 and older than 72 with risk factors for HIV infection  For pregnant patients, it is covered up to 3 times per pregnancy      Immunizations:  Immunization Recommendations   Influenza Vaccine Annual influenza vaccination during flu season is recommended for all persons aged >= 6 months who do not have contraindications   Pneumococcal Vaccine (Prevnar and Pneumovax)  * Prevnar = PCV13  * Pneumovax = PPSV23   Adults 25-60 years old: 1-3 doses may be recommended based on certain risk factors  Adults 72 years old: Prevnar (PCV13) vaccine recommended followed by Pneumovax (PPSV23) vaccine  If already received PPSV23 since turning 65, then PCV13 recommended at least one year after PPSV23 dose  Hepatitis B Vaccine 3 dose series if at intermediate or high risk (ex: diabetes, end stage renal disease, liver disease)   Tetanus (Td) Vaccine - COST NOT COVERED BY MEDICARE PART B Following completion of primary series, a booster dose should be given every 10 years to maintain immunity against tetanus  Td may also be given as tetanus wound prophylaxis  Tdap Vaccine - COST NOT COVERED BY MEDICARE PART B Recommended at least once for all adults  For pregnant patients, recommended with each pregnancy  Shingles Vaccine (Shingrix) - COST NOT COVERED BY MEDICARE PART B  2 shot series recommended in those aged 48 and above     Health Maintenance Due:  There are no preventive care reminders to display for this patient  Immunizations Due:      Topic Date Due    Influenza Vaccine  07/01/2020     Advance Directives   What are advance directives? Advance directives are legal documents that state your wishes and plans for medical care  These plans are made ahead of time in case you lose your ability to make decisions for yourself  Advance directives can apply to any medical decision, such as the treatments you want, and if you want to donate organs  What are the types of advance directives? There are many types of advance directives, and each state has rules about how to use them  You may choose a combination of any of the following:  · Living will:   This is a written record of the treatment you want  You can also choose which treatments you do not want, which to limit, and which to stop at a certain time  This includes surgery, medicine, IV fluid, and tube feedings  · Durable power of  for healthcare [place] [facility]): This is a written record that states who you want to make healthcare choices for you when you are unable to make them for yourself  This person, called a proxy, is usually a family member or a friend  You may choose more than 1 proxy  · Do not resuscitate (DNR) order:  A DNR order is used in case your heart stops beating or you stop breathing  It is a request not to have certain forms of treatment, such as CPR  A DNR order may be included in other types of advance directives  · Medical directive: This covers the care that you want if you are in a coma, near death, or unable to make decisions for yourself  You can list the treatments you want for each condition  Treatment may include pain medicine, surgery, blood transfusions, dialysis, IV or tube feedings, and a ventilator (breathing machine)  · Values history: This document has questions about your views, beliefs, and how you feel and think about life  This information can help others choose the care that you would choose  Why are advance directives important? An advance directive helps you control your care  Although spoken wishes may be used, it is better to have your wishes written down  Spoken wishes can be misunderstood, or not followed  Treatments may be given even if you do not want them  An advance directive may make it easier for your family to make difficult choices about your care  Cigarette Smoking and Your Health   Risks to your health if you smoke:  Nicotine and other chemicals found in tobacco damage every cell in your body  Even if you are a light smoker, you have an increased risk for cancer, heart disease, and lung disease   If you are pregnant or have diabetes, smoking increases your risk for complications  Benefits to your health if you stop smoking:   · You decrease respiratory symptoms such as coughing, wheezing, and shortness of breath  · You reduce your risk for cancers of the lung, mouth, throat, kidney, bladder, pancreas, stomach, and cervix  If you already have cancer, you increase the benefits of chemotherapy  You also reduce your risk for cancer returning or a second cancer from developing  · You reduce your risk for heart disease, blood clots, heart attack, and stroke  · You reduce your risk for lung infections, and diseases such as pneumonia, asthma, chronic bronchitis, and emphysema  · Your circulation improves  More oxygen can be delivered to your body  If you have diabetes, you lower your risk for complications, such as kidney, artery, and eye diseases  You also lower your risk for nerve damage  Nerve damage can lead to amputations, poor vision, and blindness  · You improve your body's ability to heal and to fight infections  For more information and support to stop smoking:   · [employer]  gov  Phone: 1- 763 - 291-6022  Web Address: [employer]   Taina Longtram 2018 Information is for End User's use only and may not be sold, redistributed or otherwise used for commercial purposes  All illustrations and images included in CareNotes® are the copyrighted property of [employer]  or [facility] Preventive Visit Patient Instructions  Thank you for completing your Welcome to Medicare Visit or Medicare Annual Wellness Visit today  Your next wellness visit will be due in one year (8/24/2021)  The screening/preventive services that you may require over the next 5-10 years are detailed below  Some tests may not apply to you based off risk factors and/or age  Screening tests ordered at today's visit but not completed yet may show as past due  Also, please note that scanned in results may not display below    Preventive Screenings:  Service Recommendations Previous Testing/Comments   Colorectal Cancer Screening  * Colonoscopy    * Fecal Occult Blood Test (FOBT)/Fecal Immunochemical Test (FIT)  * Fecal DNA/Cologuard Test  * Flexible Sigmoidoscopy Age: 54-65 years old   Colonoscopy: every 10 years (may be performed more frequently if at higher risk)  OR  FOBT/FIT: every 1 year  OR  Cologuard: every 3 years  OR  Sigmoidoscopy: every 5 years  Screening may be recommended earlier than age 48 if at higher risk for colorectal cancer  Also, an individualized decision between you and your healthcare provider will decide whether screening between the ages of 74-80 would be appropriate  Colonoscopy: 06/07/2010  FOBT/FIT: Not on file  Cologuard: Not on file  Sigmoidoscopy: Not on file         Breast Cancer Screening Age: 36 years old  Frequency: every 1-2 years  Not required if history of left and right mastectomy Mammogram: Not on file       Cervical Cancer Screening Between the ages of 21-29, pap smear recommended once every 3 years  Between the ages of 33-67, can perform pap smear with HPV co-testing every 5 years  Recommendations may differ for women with a history of total hysterectomy, cervical cancer, or abnormal pap smears in past  Pap Smear: Not on file    Screening Not Indicated   Hepatitis C Screening Once for adults born between 1945 and 1965  More frequently in patients at high risk for Hepatitis C Hep C Antibody: Not on file       Diabetes Screening 1-2 times per year if you're at risk for diabetes or have pre-diabetes Fasting glucose: No results in last 5 years   A1C: 5 8       Cholesterol Screening Once every 5 years if you don't have a lipid disorder  May order more often based on risk factors  Lipid panel: Not on file    Screening Not Indicated  History Lipid Disorder     Other Preventive Screenings Covered by Medicare:  6  Abdominal Aortic Aneurysm (AAA) Screening: covered once if your at risk   You're considered to be at risk if you have a family history of AAA  7  Lung Cancer Screening: covers low dose CT scan once per year if you meet all of the following conditions: (1) Age 50-69; (2) No signs or symptoms of lung cancer; (3) Current smoker or have quit smoking within the last 15 years; (4) You have a tobacco smoking history of at least 30 pack years (packs per day multiplied by number of years you smoked); (5) You get a written order from a healthcare provider  8  Glaucoma Screening: covered annually if you're considered high risk: (1) You have diabetes OR (2) Family history of glaucoma OR (3)  aged 48 and older OR (3)  American aged 72 and older  5  Osteoporosis Screening: covered every 2 years if you meet one of the following conditions: (1) You're estrogen deficient and at risk for osteoporosis based off medical history and other findings; (2) Have a vertebral abnormality; (3) On glucocorticoid therapy for more than 3 months; (4) Have primary hyperparathyroidism; (5) On osteoporosis medications and need to assess response to drug therapy  · Last bone density test (DXA Scan): 04/09/2018  10  HIV Screening: covered annually if you're between the age of 12-76  Also covered annually if you are younger than 13 and older than 72 with risk factors for HIV infection  For pregnant patients, it is covered up to 3 times per pregnancy  Immunizations:  Immunization Recommendations   Influenza Vaccine Annual influenza vaccination during flu season is recommended for all persons aged >= 6 months who do not have contraindications   Pneumococcal Vaccine (Prevnar and Pneumovax)  * Prevnar = PCV13  * Pneumovax = PPSV23   Adults 25-60 years old: 1-3 doses may be recommended based on certain risk factors  Adults 72 years old: Prevnar (PCV13) vaccine recommended followed by Pneumovax (PPSV23) vaccine  If already received PPSV23 since turning 65, then PCV13 recommended at least one year after PPSV23 dose     Hepatitis B Vaccine 3 dose series if at intermediate or high risk (ex: diabetes, end stage renal disease, liver disease)   Tetanus (Td) Vaccine - COST NOT COVERED BY MEDICARE PART B Following completion of primary series, a booster dose should be given every 10 years to maintain immunity against tetanus  Td may also be given as tetanus wound prophylaxis  Tdap Vaccine - COST NOT COVERED BY MEDICARE PART B Recommended at least once for all adults  For pregnant patients, recommended with each pregnancy  Shingles Vaccine (Shingrix) - COST NOT COVERED BY MEDICARE PART B  2 shot series recommended in those aged 48 and above     Health Maintenance Due:  There are no preventive care reminders to display for this patient  Immunizations Due:      Topic Date Due    Influenza Vaccine  07/01/2020     Advance Directives   What are advance directives? Advance directives are legal documents that state your wishes and plans for medical care  These plans are made ahead of time in case you lose your ability to make decisions for yourself  Advance directives can apply to any medical decision, such as the treatments you want, and if you want to donate organs  What are the types of advance directives? There are many types of advance directives, and each state has rules about how to use them  You may choose a combination of any of the following:  · Living will: This is a written record of the treatment you want  You can also choose which treatments you do not want, which to limit, and which to stop at a certain time  This includes surgery, medicine, IV fluid, and tube feedings  · Durable power of  for healthcare [place] [facility]): This is a written record that states who you want to make healthcare choices for you when you are unable to make them for yourself  This person, called a proxy, is usually a family member or a friend  You may choose more than 1 proxy    · Do not resuscitate (DNR) order:  A DNR order is used in case your heart stops beating or you stop breathing  It is a request not to have certain forms of treatment, such as CPR  A DNR order may be included in other types of advance directives  · Medical directive: This covers the care that you want if you are in a coma, near death, or unable to make decisions for yourself  You can list the treatments you want for each condition  Treatment may include pain medicine, surgery, blood transfusions, dialysis, IV or tube feedings, and a ventilator (breathing machine)  · Values history: This document has questions about your views, beliefs, and how you feel and think about life  This information can help others choose the care that you would choose  Why are advance directives important? An advance directive helps you control your care  Although spoken wishes may be used, it is better to have your wishes written down  Spoken wishes can be misunderstood, or not followed  Treatments may be given even if you do not want them  An advance directive may make it easier for your family to make difficult choices about your care  Cigarette Smoking and Your Health   Risks to your health if you smoke:  Nicotine and other chemicals found in tobacco damage every cell in your body  Even if you are a light smoker, you have an increased risk for cancer, heart disease, and lung disease  If you are pregnant or have diabetes, smoking increases your risk for complications  Benefits to your health if you stop smoking:   · You decrease respiratory symptoms such as coughing, wheezing, and shortness of breath  · You reduce your risk for cancers of the lung, mouth, throat, kidney, bladder, pancreas, stomach, and cervix  If you already have cancer, you increase the benefits of chemotherapy  You also reduce your risk for cancer returning or a second cancer from developing  · You reduce your risk for heart disease, blood clots, heart attack, and stroke     · You reduce your risk for lung infections, and diseases such as pneumonia, asthma, chronic bronchitis, and emphysema  · Your circulation improves  More oxygen can be delivered to your body  If you have diabetes, you lower your risk for complications, such as kidney, artery, and eye diseases  You also lower your risk for nerve damage  Nerve damage can lead to amputations, poor vision, and blindness  · You improve your body's ability to heal and to fight infections  For more information and support to stop smoking:   · [employer]  Phone: 7- 640 - 325-9225  Web Address: [employer]  [address] [place], [place] [address]crescencio Worthy 2018 Information is for End User's use only and may not be sold, redistributed or otherwise used for commercial purposes   All illustrations and images included in CareNotes® are the copyrighted property of [employer]  or [address] [place] [address]

## 2020-10-12 DIAGNOSIS — M51.36 DDD (DEGENERATIVE DISC DISEASE), LUMBAR: ICD-10-CM

## 2020-10-12 RX ORDER — TRAMADOL HYDROCHLORIDE 50 MG/1
TABLET ORAL
Qty: 100 TABLET | Refills: 5 | Status: SHIPPED | OUTPATIENT
Start: 2020-10-12 | End: 2021-02-26 | Stop reason: SDUPTHER

## 2020-10-12 RX ORDER — HYDROCODONE BITARTRATE AND ACETAMINOPHEN 7.5; 325 MG/1; MG/1
TABLET ORAL
Qty: 180 TABLET | Refills: 0 | Status: SHIPPED | OUTPATIENT
Start: 2020-10-12 | End: 2020-12-21 | Stop reason: SDUPTHER

## 2020-11-10 DIAGNOSIS — M51.36 DDD (DEGENERATIVE DISC DISEASE), LUMBAR: ICD-10-CM

## 2020-11-10 RX ORDER — IBUPROFEN 600 MG/1
TABLET ORAL
Qty: 270 TABLET | Refills: 3 | Status: SHIPPED | OUTPATIENT
Start: 2020-11-10 | End: 2022-01-01 | Stop reason: SDUPTHER

## 2020-12-18 ENCOUNTER — IMMUNIZATIONS (OUTPATIENT)
Dept: FAMILY MEDICINE CLINIC | Facility: CLINIC | Age: 80
End: 2020-12-18
Payer: MEDICARE

## 2020-12-18 DIAGNOSIS — Z23 NEED FOR INFLUENZA VACCINATION: Primary | ICD-10-CM

## 2020-12-18 PROCEDURE — G0008 ADMIN INFLUENZA VIRUS VAC: HCPCS

## 2020-12-18 PROCEDURE — 90662 IIV NO PRSV INCREASED AG IM: CPT

## 2020-12-21 DIAGNOSIS — M51.36 DDD (DEGENERATIVE DISC DISEASE), LUMBAR: ICD-10-CM

## 2020-12-21 RX ORDER — HYDROCODONE BITARTRATE AND ACETAMINOPHEN 7.5; 325 MG/1; MG/1
TABLET ORAL
Qty: 180 TABLET | Refills: 0 | Status: SHIPPED | OUTPATIENT
Start: 2020-12-21 | End: 2021-04-06 | Stop reason: SDUPTHER

## 2021-01-01 ENCOUNTER — HOSPITAL ENCOUNTER (OUTPATIENT)
Dept: INFUSION CENTER | Facility: CLINIC | Age: 81
Discharge: HOME | End: 2021-12-20
Payer: MEDICARE

## 2021-01-01 ENCOUNTER — HOSPITAL ENCOUNTER (OUTPATIENT)
Dept: CT IMAGING | Facility: CLINIC | Age: 81
Discharge: HOME | End: 2021-08-26
Payer: MEDICARE

## 2021-01-01 ENCOUNTER — HOSPITAL ENCOUNTER (OUTPATIENT)
Dept: INFUSION CENTER | Facility: CLINIC | Age: 81
Discharge: HOME | End: 2021-12-17
Payer: MEDICARE

## 2021-01-01 ENCOUNTER — PATIENT OUTREACH (OUTPATIENT)
Dept: CASE MANAGEMENT | Facility: HOSPITAL | Age: 81
End: 2021-01-01

## 2021-01-01 ENCOUNTER — ANESTHESIA EVENT (OUTPATIENT)
Dept: GASTROENTEROLOGY | Facility: HOSPITAL | Age: 81
End: 2021-01-01

## 2021-01-01 ENCOUNTER — HOSPITAL ENCOUNTER (OUTPATIENT)
Dept: INFUSION CENTER | Facility: CLINIC | Age: 81
Discharge: HOME | End: 2021-11-08
Payer: MEDICARE

## 2021-01-01 ENCOUNTER — TELEPHONE (OUTPATIENT)
Dept: GASTROENTEROLOGY | Facility: CLINIC | Age: 81
End: 2021-01-01

## 2021-01-01 ENCOUNTER — TELEPHONE (OUTPATIENT)
Dept: HEMATOLOGY ONCOLOGY | Facility: CLINIC | Age: 81
End: 2021-01-01

## 2021-01-01 ENCOUNTER — OFFICE VISIT (OUTPATIENT)
Dept: HEMATOLOGY ONCOLOGY | Facility: CLINIC | Age: 81
End: 2021-01-01
Payer: MEDICARE

## 2021-01-01 ENCOUNTER — HOSPITAL ENCOUNTER (OUTPATIENT)
Dept: ULTRASOUND IMAGING | Facility: CLINIC | Age: 81
Discharge: HOME | End: 2021-08-20
Payer: MEDICARE

## 2021-01-01 ENCOUNTER — PATIENT OUTREACH (OUTPATIENT)
Dept: HEMATOLOGY ONCOLOGY | Facility: CLINIC | Age: 81
End: 2021-01-01

## 2021-01-01 ENCOUNTER — HOSPITAL ENCOUNTER (OUTPATIENT)
Dept: INFUSION CENTER | Facility: CLINIC | Age: 81
Discharge: HOME | End: 2021-11-05
Payer: MEDICARE

## 2021-01-01 ENCOUNTER — OFFICE VISIT (OUTPATIENT)
Dept: PALLIATIVE MEDICINE | Facility: CLINIC | Age: 81
End: 2021-01-01
Payer: MEDICARE

## 2021-01-01 ENCOUNTER — OFFICE VISIT (OUTPATIENT)
Dept: FAMILY MEDICINE CLINIC | Facility: CLINIC | Age: 81
End: 2021-01-01
Payer: MEDICARE

## 2021-01-01 ENCOUNTER — HOSPITAL ENCOUNTER (OUTPATIENT)
Dept: NON INVASIVE DIAGNOSTICS | Facility: HOSPITAL | Age: 81
Discharge: HOME | End: 2021-11-02
Attending: INTERNAL MEDICINE | Admitting: RADIOLOGY
Payer: MEDICARE

## 2021-01-01 ENCOUNTER — TELEPHONE (OUTPATIENT)
Dept: NUTRITION | Facility: CLINIC | Age: 81
End: 2021-01-01

## 2021-01-01 ENCOUNTER — APPOINTMENT (OUTPATIENT)
Dept: LAB | Facility: HOSPITAL | Age: 81
End: 2021-01-01
Attending: FAMILY MEDICINE
Payer: MEDICARE

## 2021-01-01 ENCOUNTER — NUTRITION (OUTPATIENT)
Dept: NUTRITION | Facility: CLINIC | Age: 81
End: 2021-01-01

## 2021-01-01 ENCOUNTER — TELEPHONE (OUTPATIENT)
Dept: FAMILY MEDICINE CLINIC | Facility: CLINIC | Age: 81
End: 2021-01-01

## 2021-01-01 ENCOUNTER — TELEPHONE (OUTPATIENT)
Dept: PALLIATIVE MEDICINE | Facility: CLINIC | Age: 81
End: 2021-01-01

## 2021-01-01 ENCOUNTER — APPOINTMENT (OUTPATIENT)
Dept: LAB | Facility: HOSPITAL | Age: 81
End: 2021-01-01
Attending: INTERNAL MEDICINE
Payer: MEDICARE

## 2021-01-01 ENCOUNTER — HOSPITAL ENCOUNTER (OUTPATIENT)
Dept: MRI IMAGING | Facility: CLINIC | Age: 81
Discharge: HOME | End: 2021-09-17
Payer: MEDICARE

## 2021-01-01 ENCOUNTER — TELEPHONE (OUTPATIENT)
Dept: SURGICAL ONCOLOGY | Facility: CLINIC | Age: 81
End: 2021-01-01

## 2021-01-01 ENCOUNTER — CONSULT (OUTPATIENT)
Dept: HEMATOLOGY ONCOLOGY | Facility: CLINIC | Age: 81
End: 2021-01-01
Payer: MEDICARE

## 2021-01-01 ENCOUNTER — DOCUMENTATION (OUTPATIENT)
Dept: HEMATOLOGY ONCOLOGY | Facility: CLINIC | Age: 81
End: 2021-01-01

## 2021-01-01 ENCOUNTER — CONSULT (OUTPATIENT)
Dept: PALLIATIVE MEDICINE | Facility: CLINIC | Age: 81
End: 2021-01-01
Payer: MEDICARE

## 2021-01-01 ENCOUNTER — HOSPITAL ENCOUNTER (OUTPATIENT)
Dept: INFUSION CENTER | Facility: CLINIC | Age: 81
Discharge: HOME | End: 2021-12-06
Payer: MEDICARE

## 2021-01-01 ENCOUNTER — HOSPITAL ENCOUNTER (OUTPATIENT)
Dept: CT IMAGING | Facility: HOSPITAL | Age: 81
Discharge: HOME | End: 2021-09-28
Attending: INTERNAL MEDICINE
Payer: MEDICARE

## 2021-01-01 ENCOUNTER — TELEPHONE (OUTPATIENT)
Dept: INTERVENTIONAL RADIOLOGY/VASCULAR | Facility: HOSPITAL | Age: 81
End: 2021-01-01

## 2021-01-01 ENCOUNTER — HOSPITAL ENCOUNTER (OUTPATIENT)
Dept: INFUSION CENTER | Facility: CLINIC | Age: 81
Discharge: HOME | End: 2021-11-19
Payer: MEDICARE

## 2021-01-01 ENCOUNTER — HOSPITAL ENCOUNTER (OUTPATIENT)
Dept: GASTROENTEROLOGY | Facility: HOSPITAL | Age: 81
Setting detail: OUTPATIENT SURGERY
Discharge: HOME | End: 2021-10-13
Attending: INTERNAL MEDICINE | Admitting: INTERNAL MEDICINE
Payer: MEDICARE

## 2021-01-01 ENCOUNTER — HOSPITAL ENCOUNTER (OUTPATIENT)
Dept: INFUSION CENTER | Facility: CLINIC | Age: 81
Discharge: HOME | End: 2021-12-03
Payer: MEDICARE

## 2021-01-01 ENCOUNTER — HOSPITAL ENCOUNTER (OUTPATIENT)
Dept: INFUSION CENTER | Facility: CLINIC | Age: 81
Discharge: HOME | End: 2021-12-30

## 2021-01-01 ENCOUNTER — TELEPHONE (OUTPATIENT)
Dept: GASTROENTEROLOGY | Facility: HOSPITAL | Age: 81
End: 2021-01-01

## 2021-01-01 ENCOUNTER — PREP FOR PROCEDURE (OUTPATIENT)
Dept: GASTROENTEROLOGY | Facility: MEDICAL CENTER | Age: 81
End: 2021-01-01

## 2021-01-01 ENCOUNTER — HOSPITAL ENCOUNTER (OUTPATIENT)
Dept: INFUSION CENTER | Facility: CLINIC | Age: 81
Discharge: HOME | End: 2021-11-22

## 2021-01-01 ENCOUNTER — DOCUMENTATION (OUTPATIENT)
Dept: GASTROENTEROLOGY | Facility: MEDICAL CENTER | Age: 81
End: 2021-01-01

## 2021-01-01 ENCOUNTER — APPOINTMENT (OUTPATIENT)
Dept: LAB | Facility: HOSPITAL | Age: 81
End: 2021-01-01
Payer: MEDICARE

## 2021-01-01 ENCOUNTER — ANESTHESIA (OUTPATIENT)
Dept: GASTROENTEROLOGY | Facility: HOSPITAL | Age: 81
End: 2021-01-01

## 2021-01-01 VITALS
TEMPERATURE: 97.7 F | HEIGHT: 67 IN | BODY MASS INDEX: 19.06 KG/M2 | WEIGHT: 121.4 LBS | OXYGEN SATURATION: 97 % | DIASTOLIC BLOOD PRESSURE: 76 MMHG | SYSTOLIC BLOOD PRESSURE: 170 MMHG | HEART RATE: 86 BPM

## 2021-01-01 VITALS
TEMPERATURE: 97.7 F | OXYGEN SATURATION: 97 % | HEART RATE: 88 BPM | HEIGHT: 66 IN | BODY MASS INDEX: 17.84 KG/M2 | SYSTOLIC BLOOD PRESSURE: 138 MMHG | WEIGHT: 111 LBS | DIASTOLIC BLOOD PRESSURE: 74 MMHG | RESPIRATION RATE: 18 BRPM

## 2021-01-01 VITALS
BODY MASS INDEX: 18.16 KG/M2 | RESPIRATION RATE: 18 BRPM | DIASTOLIC BLOOD PRESSURE: 80 MMHG | HEIGHT: 66 IN | SYSTOLIC BLOOD PRESSURE: 132 MMHG | TEMPERATURE: 97.1 F | HEART RATE: 106 BPM | WEIGHT: 113 LBS | OXYGEN SATURATION: 98 %

## 2021-01-01 VITALS
HEART RATE: 76 BPM | DIASTOLIC BLOOD PRESSURE: 68 MMHG | RESPIRATION RATE: 20 BRPM | BODY MASS INDEX: 17.76 KG/M2 | WEIGHT: 110 LBS | OXYGEN SATURATION: 97 % | SYSTOLIC BLOOD PRESSURE: 138 MMHG | TEMPERATURE: 97.5 F

## 2021-01-01 VITALS
TEMPERATURE: 96.7 F | HEART RATE: 74 BPM | SYSTOLIC BLOOD PRESSURE: 152 MMHG | BODY MASS INDEX: 18.48 KG/M2 | DIASTOLIC BLOOD PRESSURE: 71 MMHG | HEIGHT: 66 IN | OXYGEN SATURATION: 96 % | WEIGHT: 115 LBS | RESPIRATION RATE: 18 BRPM

## 2021-01-01 VITALS
BODY MASS INDEX: 17.93 KG/M2 | HEART RATE: 77 BPM | TEMPERATURE: 98.2 F | RESPIRATION RATE: 16 BRPM | WEIGHT: 111.55 LBS | DIASTOLIC BLOOD PRESSURE: 61 MMHG | SYSTOLIC BLOOD PRESSURE: 126 MMHG | HEIGHT: 66 IN

## 2021-01-01 VITALS
BODY MASS INDEX: 16.88 KG/M2 | WEIGHT: 105 LBS | DIASTOLIC BLOOD PRESSURE: 58 MMHG | SYSTOLIC BLOOD PRESSURE: 126 MMHG | HEART RATE: 59 BPM | RESPIRATION RATE: 16 BRPM | TEMPERATURE: 98 F | HEIGHT: 66 IN | OXYGEN SATURATION: 97 %

## 2021-01-01 VITALS
SYSTOLIC BLOOD PRESSURE: 140 MMHG | HEART RATE: 77 BPM | RESPIRATION RATE: 20 BRPM | DIASTOLIC BLOOD PRESSURE: 60 MMHG | TEMPERATURE: 98.7 F | WEIGHT: 112 LBS | OXYGEN SATURATION: 97 % | BODY MASS INDEX: 18.08 KG/M2

## 2021-01-01 VITALS
DIASTOLIC BLOOD PRESSURE: 56 MMHG | OXYGEN SATURATION: 98 % | RESPIRATION RATE: 17 BRPM | SYSTOLIC BLOOD PRESSURE: 120 MMHG | HEIGHT: 66 IN | BODY MASS INDEX: 17.57 KG/M2 | HEART RATE: 72 BPM | TEMPERATURE: 98 F | WEIGHT: 109.35 LBS

## 2021-01-01 VITALS
SYSTOLIC BLOOD PRESSURE: 162 MMHG | RESPIRATION RATE: 18 BRPM | HEART RATE: 105 BPM | HEIGHT: 66 IN | BODY MASS INDEX: 18.32 KG/M2 | TEMPERATURE: 97 F | OXYGEN SATURATION: 99 % | DIASTOLIC BLOOD PRESSURE: 86 MMHG | WEIGHT: 114 LBS

## 2021-01-01 VITALS
HEART RATE: 89 BPM | HEIGHT: 66 IN | TEMPERATURE: 98.2 F | DIASTOLIC BLOOD PRESSURE: 66 MMHG | WEIGHT: 109 LBS | OXYGEN SATURATION: 98 % | BODY MASS INDEX: 17.52 KG/M2 | SYSTOLIC BLOOD PRESSURE: 134 MMHG

## 2021-01-01 VITALS
SYSTOLIC BLOOD PRESSURE: 165 MMHG | BODY MASS INDEX: 17.79 KG/M2 | TEMPERATURE: 96.1 F | RESPIRATION RATE: 18 BRPM | WEIGHT: 110.67 LBS | HEIGHT: 66 IN | DIASTOLIC BLOOD PRESSURE: 70 MMHG | HEART RATE: 87 BPM

## 2021-01-01 VITALS
RESPIRATION RATE: 20 BRPM | SYSTOLIC BLOOD PRESSURE: 148 MMHG | WEIGHT: 111.8 LBS | BODY MASS INDEX: 18.05 KG/M2 | TEMPERATURE: 98.4 F | HEART RATE: 83 BPM | DIASTOLIC BLOOD PRESSURE: 58 MMHG | OXYGEN SATURATION: 99 %

## 2021-01-01 VITALS
TEMPERATURE: 96.5 F | DIASTOLIC BLOOD PRESSURE: 60 MMHG | HEIGHT: 66 IN | BODY MASS INDEX: 17.61 KG/M2 | HEART RATE: 89 BPM | WEIGHT: 109.57 LBS | SYSTOLIC BLOOD PRESSURE: 130 MMHG | OXYGEN SATURATION: 97 % | RESPIRATION RATE: 19 BRPM

## 2021-01-01 VITALS — TEMPERATURE: 97.8 F

## 2021-01-01 VITALS — TEMPERATURE: 96.2 F

## 2021-01-01 DIAGNOSIS — K59.03 DRUG-INDUCED CONSTIPATION: ICD-10-CM

## 2021-01-01 DIAGNOSIS — Z91.89 AT HIGH RISK FOR INFECTION DUE TO CHEMOTHERAPY: ICD-10-CM

## 2021-01-01 DIAGNOSIS — C25.9 PANCREATIC ADENOCARCINOMA (HCC): Primary | ICD-10-CM

## 2021-01-01 DIAGNOSIS — E78.2 MIXED HYPERLIPIDEMIA: ICD-10-CM

## 2021-01-01 DIAGNOSIS — Z95.828 PORT-A-CATH IN PLACE: Primary | ICD-10-CM

## 2021-01-01 DIAGNOSIS — R11.0 NAUSEA: ICD-10-CM

## 2021-01-01 DIAGNOSIS — Z92.21 AT HIGH RISK FOR INFECTION DUE TO CHEMOTHERAPY: ICD-10-CM

## 2021-01-01 DIAGNOSIS — Z78.9 NEED FOR FOLLOW-UP BY SOCIAL WORKER: Primary | ICD-10-CM

## 2021-01-01 DIAGNOSIS — Z95.828 PORT-A-CATH IN PLACE: ICD-10-CM

## 2021-01-01 DIAGNOSIS — Z51.11 ENCOUNTER FOR CHEMOTHERAPY MANAGEMENT: ICD-10-CM

## 2021-01-01 DIAGNOSIS — K76.9 LIVER LESION: ICD-10-CM

## 2021-01-01 DIAGNOSIS — D49.0 PANCREAS NEOPLASM: Primary | ICD-10-CM

## 2021-01-01 DIAGNOSIS — R73.01 IMPAIRED FASTING GLUCOSE: ICD-10-CM

## 2021-01-01 DIAGNOSIS — K86.1 CHRONIC PANCREATITIS, UNSPECIFIED PANCREATITIS TYPE (HCC): ICD-10-CM

## 2021-01-01 DIAGNOSIS — I10 ESSENTIAL HYPERTENSION: ICD-10-CM

## 2021-01-01 DIAGNOSIS — E44.1 MILD PROTEIN-CALORIE MALNUTRITION (HCC): ICD-10-CM

## 2021-01-01 DIAGNOSIS — G89.3 CANCER ASSOCIATED PAIN: ICD-10-CM

## 2021-01-01 DIAGNOSIS — C25.9 PANCREATIC ADENOCARCINOMA (HCC): ICD-10-CM

## 2021-01-01 DIAGNOSIS — D72.829 LEUKOCYTOSIS, UNSPECIFIED TYPE: ICD-10-CM

## 2021-01-01 DIAGNOSIS — R11.0 CHEMOTHERAPY-INDUCED NAUSEA: ICD-10-CM

## 2021-01-01 DIAGNOSIS — M51.36 DDD (DEGENERATIVE DISC DISEASE), LUMBAR: ICD-10-CM

## 2021-01-01 DIAGNOSIS — Z72.0 TOBACCO USE: ICD-10-CM

## 2021-01-01 DIAGNOSIS — K86.89 PANCREATIC MASS: ICD-10-CM

## 2021-01-01 DIAGNOSIS — R16.0 LIVER MASS: ICD-10-CM

## 2021-01-01 DIAGNOSIS — Z71.3 NUTRITIONAL COUNSELING: Primary | ICD-10-CM

## 2021-01-01 DIAGNOSIS — R63.4 WEIGHT LOSS: ICD-10-CM

## 2021-01-01 DIAGNOSIS — F41.9 ANXIETY: ICD-10-CM

## 2021-01-01 DIAGNOSIS — K86.89 PANCREATIC MASS: Primary | ICD-10-CM

## 2021-01-01 DIAGNOSIS — C25.1 MALIGNANT NEOPLASM OF BODY OF PANCREAS (HCC): ICD-10-CM

## 2021-01-01 DIAGNOSIS — F32.9 REACTIVE DEPRESSION: ICD-10-CM

## 2021-01-01 DIAGNOSIS — M51.36 DDD (DEGENERATIVE DISC DISEASE), LUMBAR: Primary | ICD-10-CM

## 2021-01-01 DIAGNOSIS — D49.0 PANCREAS NEOPLASM: ICD-10-CM

## 2021-01-01 DIAGNOSIS — K86.9 PANCREATIC LESION: Primary | ICD-10-CM

## 2021-01-01 DIAGNOSIS — Z79.899 MEDICAL MARIJUANA USE: ICD-10-CM

## 2021-01-01 DIAGNOSIS — Z72.0 TOBACCO USE: Primary | ICD-10-CM

## 2021-01-01 DIAGNOSIS — M81.0 OSTEOPOROSIS, UNSPECIFIED OSTEOPOROSIS TYPE, UNSPECIFIED PATHOLOGICAL FRACTURE PRESENCE: ICD-10-CM

## 2021-01-01 DIAGNOSIS — K76.9 LIVER LESION: Primary | ICD-10-CM

## 2021-01-01 DIAGNOSIS — T40.2X5A THERAPEUTIC OPIOID-INDUCED CONSTIPATION (OIC): ICD-10-CM

## 2021-01-01 DIAGNOSIS — D64.9 ANEMIA, UNSPECIFIED TYPE: ICD-10-CM

## 2021-01-01 DIAGNOSIS — K59.03 THERAPEUTIC OPIOID-INDUCED CONSTIPATION (OIC): ICD-10-CM

## 2021-01-01 DIAGNOSIS — R16.0 LIVER MASS: Primary | ICD-10-CM

## 2021-01-01 DIAGNOSIS — T45.1X5A CHEMOTHERAPY-INDUCED NAUSEA: ICD-10-CM

## 2021-01-01 DIAGNOSIS — K86.9 DISEASE OF PANCREAS: ICD-10-CM

## 2021-01-01 DIAGNOSIS — Z00.00 MEDICARE ANNUAL WELLNESS VISIT, SUBSEQUENT: ICD-10-CM

## 2021-01-01 DIAGNOSIS — K59.00 CONSTIPATION, UNSPECIFIED CONSTIPATION TYPE: ICD-10-CM

## 2021-01-01 LAB
ALBUMIN SERPL BCP-MCNC: 3.4 G/DL (ref 3.5–5)
ALBUMIN SERPL BCP-MCNC: 3.4 G/DL (ref 3.5–5)
ALBUMIN SERPL BCP-MCNC: 3.5 G/DL (ref 3.5–5)
ALBUMIN SERPL BCP-MCNC: 3.6 G/DL (ref 3.5–5)
ALBUMIN SERPL BCP-MCNC: 3.7 G/DL (ref 3.5–5)
ALBUMIN SERPL BCP-MCNC: 3.7 G/DL (ref 3.5–5)
ALP SERPL-CCNC: 114 U/L (ref 46–116)
ALP SERPL-CCNC: 218 U/L (ref 46–116)
ALP SERPL-CCNC: 300 U/L (ref 46–116)
ALP SERPL-CCNC: 315 U/L (ref 46–116)
ALP SERPL-CCNC: 464 U/L (ref 46–116)
ALP SERPL-CCNC: 97 U/L (ref 46–116)
ALT SERPL W P-5'-P-CCNC: 109 U/L (ref 12–78)
ALT SERPL W P-5'-P-CCNC: 134 U/L (ref 12–78)
ALT SERPL W P-5'-P-CCNC: 25 U/L (ref 12–78)
ALT SERPL W P-5'-P-CCNC: 55 U/L (ref 12–78)
ALT SERPL W P-5'-P-CCNC: 67 U/L (ref 12–78)
ALT SERPL W P-5'-P-CCNC: 90 U/L (ref 12–78)
ANION GAP SERPL CALCULATED.3IONS-SCNC: 10 MMOL/L (ref 4–13)
ANION GAP SERPL CALCULATED.3IONS-SCNC: 10 MMOL/L (ref 4–13)
ANION GAP SERPL CALCULATED.3IONS-SCNC: 11 MMOL/L (ref 4–13)
ANION GAP SERPL CALCULATED.3IONS-SCNC: 12 MMOL/L (ref 4–13)
ANION GAP SERPL CALCULATED.3IONS-SCNC: 9 MMOL/L (ref 4–13)
ANION GAP SERPL CALCULATED.3IONS-SCNC: 9 MMOL/L (ref 4–13)
AST SERPL W P-5'-P-CCNC: 20 U/L (ref 5–45)
AST SERPL W P-5'-P-CCNC: 20 U/L (ref 5–45)
AST SERPL W P-5'-P-CCNC: 29 U/L (ref 5–45)
AST SERPL W P-5'-P-CCNC: 32 U/L (ref 5–45)
AST SERPL W P-5'-P-CCNC: 36 U/L (ref 5–45)
AST SERPL W P-5'-P-CCNC: 49 U/L (ref 5–45)
BASOPHILS # BLD AUTO: 0.02 THOUSANDS/ΜL (ref 0–0.1)
BASOPHILS # BLD AUTO: 0.03 THOUSANDS/ΜL (ref 0–0.1)
BASOPHILS # BLD AUTO: 0.03 THOUSANDS/ΜL (ref 0–0.1)
BASOPHILS # BLD AUTO: 0.04 THOUSANDS/ΜL (ref 0–0.1)
BASOPHILS # BLD AUTO: 0.07 THOUSANDS/ΜL (ref 0–0.1)
BASOPHILS # BLD AUTO: 0.09 THOUSANDS/ΜL (ref 0–0.1)
BASOPHILS # BLD MANUAL: 0 THOUSAND/UL (ref 0–0.1)
BASOPHILS NFR BLD AUTO: 0 % (ref 0–1)
BASOPHILS NFR BLD AUTO: 1 % (ref 0–1)
BASOPHILS NFR MAR MANUAL: 0 % (ref 0–1)
BILIRUB SERPL-MCNC: 0.25 MG/DL (ref 0.2–1)
BILIRUB SERPL-MCNC: 0.36 MG/DL (ref 0.2–1)
BILIRUB SERPL-MCNC: 0.37 MG/DL (ref 0.2–1)
BILIRUB SERPL-MCNC: 0.43 MG/DL (ref 0.2–1)
BILIRUB SERPL-MCNC: 0.54 MG/DL (ref 0.2–1)
BILIRUB SERPL-MCNC: 0.67 MG/DL (ref 0.2–1)
BUN SERPL-MCNC: 11 MG/DL (ref 5–25)
BUN SERPL-MCNC: 12 MG/DL (ref 5–25)
BUN SERPL-MCNC: 12 MG/DL (ref 5–25)
BUN SERPL-MCNC: 13 MG/DL (ref 5–25)
BUN SERPL-MCNC: 15 MG/DL (ref 5–25)
BUN SERPL-MCNC: 15 MG/DL (ref 5–25)
CALCIUM ALBUM COR SERPL-MCNC: 9.1 MG/DL (ref 8.3–10.1)
CALCIUM ALBUM COR SERPL-MCNC: 9.3 MG/DL (ref 8.3–10.1)
CALCIUM SERPL-MCNC: 8.6 MG/DL (ref 8.3–10.1)
CALCIUM SERPL-MCNC: 8.8 MG/DL (ref 8.3–10.1)
CALCIUM SERPL-MCNC: 9 MG/DL (ref 8.3–10.1)
CALCIUM SERPL-MCNC: 9.2 MG/DL (ref 8.3–10.1)
CANCER AG19-9 SERPL-ACNC: 114 U/ML (ref 0–35)
CANCER AG19-9 SERPL-ACNC: 166 U/ML (ref 0–35)
CANCER AG19-9 SERPL-ACNC: 171 U/ML (ref 0–35)
CANCER AG19-9 SERPL-ACNC: 82 U/ML (ref 0–35)
CEA SERPL-MCNC: 3.6 NG/ML (ref 0–3)
CHLORIDE SERPL-SCNC: 105 MMOL/L (ref 100–108)
CHLORIDE SERPL-SCNC: 106 MMOL/L (ref 100–108)
CHLORIDE SERPL-SCNC: 107 MMOL/L (ref 100–108)
CHLORIDE SERPL-SCNC: 107 MMOL/L (ref 100–108)
CHLORIDE SERPL-SCNC: 108 MMOL/L (ref 100–108)
CHLORIDE SERPL-SCNC: 109 MMOL/L (ref 100–108)
CHOLEST SERPL-MCNC: 136 MG/DL (ref 50–200)
CO2 SERPL-SCNC: 23 MMOL/L (ref 21–32)
CO2 SERPL-SCNC: 24 MMOL/L (ref 21–32)
CO2 SERPL-SCNC: 24 MMOL/L (ref 21–32)
CO2 SERPL-SCNC: 25 MMOL/L (ref 21–32)
CREAT SERPL-MCNC: 0.65 MG/DL (ref 0.6–1.3)
CREAT SERPL-MCNC: 0.71 MG/DL (ref 0.6–1.3)
CREAT SERPL-MCNC: 0.73 MG/DL (ref 0.6–1.3)
CREAT SERPL-MCNC: 0.75 MG/DL (ref 0.6–1.3)
CREAT SERPL-MCNC: 0.81 MG/DL (ref 0.6–1.3)
CREAT SERPL-MCNC: 0.83 MG/DL (ref 0.6–1.3)
EOSINOPHIL # BLD AUTO: 0.08 THOUSAND/ΜL (ref 0–0.61)
EOSINOPHIL # BLD AUTO: 0.08 THOUSAND/ΜL (ref 0–0.61)
EOSINOPHIL # BLD AUTO: 0.1 THOUSAND/ΜL (ref 0–0.61)
EOSINOPHIL # BLD AUTO: 0.12 THOUSAND/ΜL (ref 0–0.61)
EOSINOPHIL # BLD AUTO: 0.16 THOUSAND/ΜL (ref 0–0.61)
EOSINOPHIL # BLD AUTO: 0.21 THOUSAND/ΜL (ref 0–0.61)
EOSINOPHIL # BLD MANUAL: 0.25 THOUSAND/UL (ref 0–0.4)
EOSINOPHIL NFR BLD AUTO: 1 % (ref 0–6)
EOSINOPHIL NFR BLD AUTO: 2 % (ref 0–6)
EOSINOPHIL NFR BLD AUTO: 2 % (ref 0–6)
EOSINOPHIL NFR BLD MANUAL: 1 % (ref 0–6)
ERYTHROCYTE [DISTWIDTH] IN BLOOD BY AUTOMATED COUNT: 12.2 % (ref 11.6–15.1)
ERYTHROCYTE [DISTWIDTH] IN BLOOD BY AUTOMATED COUNT: 12.4 % (ref 11.6–15.1)
ERYTHROCYTE [DISTWIDTH] IN BLOOD BY AUTOMATED COUNT: 12.5 % (ref 11.6–15.1)
ERYTHROCYTE [DISTWIDTH] IN BLOOD BY AUTOMATED COUNT: 12.9 % (ref 11.6–15.1)
ERYTHROCYTE [DISTWIDTH] IN BLOOD BY AUTOMATED COUNT: 13.4 % (ref 11.6–15.1)
ERYTHROCYTE [DISTWIDTH] IN BLOOD BY AUTOMATED COUNT: 14 % (ref 11.6–15.1)
ERYTHROCYTE [DISTWIDTH] IN BLOOD BY AUTOMATED COUNT: 14.2 % (ref 11.6–15.1)
EST. AVERAGE GLUCOSE BLD GHB EST-MCNC: 123 MG/DL
GFR SERPL CREATININE-BSD FRML MDRD: 67 ML/MIN/1.73SQ M
GFR SERPL CREATININE-BSD FRML MDRD: 69 ML/MIN/1.73SQ M
GFR SERPL CREATININE-BSD FRML MDRD: 76 ML/MIN/1.73SQ M
GFR SERPL CREATININE-BSD FRML MDRD: 78 ML/MIN/1.73SQ M
GFR SERPL CREATININE-BSD FRML MDRD: 81 ML/MIN/1.73SQ M
GFR SERPL CREATININE-BSD FRML MDRD: 85 ML/MIN/1.73SQ M
GLUCOSE P FAST SERPL-MCNC: 122 MG/DL (ref 65–99)
GLUCOSE SERPL-MCNC: 117 MG/DL (ref 65–140)
GLUCOSE SERPL-MCNC: 136 MG/DL (ref 65–140)
GLUCOSE SERPL-MCNC: 148 MG/DL (ref 65–140)
GLUCOSE SERPL-MCNC: 161 MG/DL (ref 65–140)
GLUCOSE SERPL-MCNC: 180 MG/DL (ref 65–140)
HBA1C MFR BLD: 5.9 %
HCT VFR BLD AUTO: 34.1 % (ref 34.8–46.1)
HCT VFR BLD AUTO: 34.6 % (ref 34.8–46.1)
HCT VFR BLD AUTO: 35.2 % (ref 34.8–46.1)
HCT VFR BLD AUTO: 35.3 % (ref 34.8–46.1)
HCT VFR BLD AUTO: 35.3 % (ref 34.8–46.1)
HCT VFR BLD AUTO: 37.7 % (ref 34.8–46.1)
HCT VFR BLD AUTO: 37.9 % (ref 34.8–46.1)
HDLC SERPL-MCNC: 66 MG/DL
HGB BLD-MCNC: 11.1 G/DL (ref 11.5–15.4)
HGB BLD-MCNC: 11.2 G/DL (ref 11.5–15.4)
HGB BLD-MCNC: 11.3 G/DL (ref 11.5–15.4)
HGB BLD-MCNC: 11.4 G/DL (ref 11.5–15.4)
HGB BLD-MCNC: 11.5 G/DL (ref 11.5–15.4)
HGB BLD-MCNC: 12.7 G/DL (ref 11.5–15.4)
HGB BLD-MCNC: 12.8 G/DL (ref 11.5–15.4)
IMM GRANULOCYTES # BLD AUTO: 0.02 THOUSAND/UL (ref 0–0.2)
IMM GRANULOCYTES # BLD AUTO: 0.03 THOUSAND/UL (ref 0–0.2)
IMM GRANULOCYTES # BLD AUTO: 0.04 THOUSAND/UL (ref 0–0.2)
IMM GRANULOCYTES # BLD AUTO: >0.5 THOUSAND/UL (ref 0–0.2)
IMM GRANULOCYTES NFR BLD AUTO: 0 % (ref 0–2)
IMM GRANULOCYTES NFR BLD AUTO: 1 % (ref 0–2)
IMM GRANULOCYTES NFR BLD AUTO: 2 % (ref 0–2)
LDLC SERPL CALC-MCNC: 58 MG/DL (ref 0–100)
LYMPHOCYTES # BLD AUTO: 1.23 THOUSANDS/ΜL (ref 0.6–4.47)
LYMPHOCYTES # BLD AUTO: 1.58 THOUSANDS/ΜL (ref 0.6–4.47)
LYMPHOCYTES # BLD AUTO: 1.74 THOUSANDS/ΜL (ref 0.6–4.47)
LYMPHOCYTES # BLD AUTO: 11 % (ref 14–44)
LYMPHOCYTES # BLD AUTO: 2.29 THOUSANDS/ΜL (ref 0.6–4.47)
LYMPHOCYTES # BLD AUTO: 2.6 THOUSANDS/ΜL (ref 0.6–4.47)
LYMPHOCYTES # BLD AUTO: 2.77 THOUSAND/UL (ref 0.6–4.47)
LYMPHOCYTES # BLD AUTO: 3.28 THOUSANDS/ΜL (ref 0.6–4.47)
LYMPHOCYTES NFR BLD AUTO: 16 % (ref 14–44)
LYMPHOCYTES NFR BLD AUTO: 19 % (ref 14–44)
LYMPHOCYTES NFR BLD AUTO: 25 % (ref 14–44)
LYMPHOCYTES NFR BLD AUTO: 29 % (ref 14–44)
LYMPHOCYTES NFR BLD AUTO: 37 % (ref 14–44)
LYMPHOCYTES NFR BLD AUTO: 9 % (ref 14–44)
MCH RBC QN AUTO: 32.1 PG (ref 26.8–34.3)
MCH RBC QN AUTO: 32.5 PG (ref 26.8–34.3)
MCH RBC QN AUTO: 32.7 PG (ref 26.8–34.3)
MCH RBC QN AUTO: 32.9 PG (ref 26.8–34.3)
MCH RBC QN AUTO: 33.2 PG (ref 26.8–34.3)
MCH RBC QN AUTO: 33.4 PG (ref 26.8–34.3)
MCH RBC QN AUTO: 33.4 PG (ref 26.8–34.3)
MCHC RBC AUTO-ENTMCNC: 31.7 G/DL (ref 31.4–37.4)
MCHC RBC AUTO-ENTMCNC: 32 G/DL (ref 31.4–37.4)
MCHC RBC AUTO-ENTMCNC: 32.4 G/DL (ref 31.4–37.4)
MCHC RBC AUTO-ENTMCNC: 32.6 G/DL (ref 31.4–37.4)
MCHC RBC AUTO-ENTMCNC: 33.2 G/DL (ref 31.4–37.4)
MCHC RBC AUTO-ENTMCNC: 33.5 G/DL (ref 31.4–37.4)
MCHC RBC AUTO-ENTMCNC: 34 G/DL (ref 31.4–37.4)
MCV RBC AUTO: 101 FL (ref 82–98)
MCV RBC AUTO: 101 FL (ref 82–98)
MCV RBC AUTO: 103 FL (ref 82–98)
MCV RBC AUTO: 103 FL (ref 82–98)
MCV RBC AUTO: 98 FL (ref 82–98)
MCV RBC AUTO: 98 FL (ref 82–98)
MCV RBC AUTO: 99 FL (ref 82–98)
METAMYELOCYTES NFR BLD MANUAL: 1 % (ref 0–1)
MISCELLANEOUS LAB TEST RESULT: NORMAL
MONOCYTES # BLD AUTO: 0.5 THOUSAND/ΜL (ref 0.17–1.22)
MONOCYTES # BLD AUTO: 0.55 THOUSAND/ΜL (ref 0.17–1.22)
MONOCYTES # BLD AUTO: 0.69 THOUSAND/ΜL (ref 0.17–1.22)
MONOCYTES # BLD AUTO: 0.69 THOUSAND/ΜL (ref 0.17–1.22)
MONOCYTES # BLD AUTO: 0.74 THOUSAND/ΜL (ref 0.17–1.22)
MONOCYTES # BLD AUTO: 1.01 THOUSAND/UL (ref 0–1.22)
MONOCYTES # BLD AUTO: 1.45 THOUSAND/ΜL (ref 0.17–1.22)
MONOCYTES NFR BLD AUTO: 11 % (ref 4–12)
MONOCYTES NFR BLD AUTO: 5 % (ref 4–12)
MONOCYTES NFR BLD AUTO: 7 % (ref 4–12)
MONOCYTES NFR BLD AUTO: 7 % (ref 4–12)
MONOCYTES NFR BLD AUTO: 8 % (ref 4–12)
MONOCYTES NFR BLD AUTO: 8 % (ref 4–12)
MONOCYTES NFR BLD: 4 % (ref 4–12)
MYELOCYTES NFR BLD MANUAL: 2 % (ref 0–1)
NEUTROPHILS # BLD AUTO: 25.62 THOUSANDS/ΜL (ref 1.85–7.62)
NEUTROPHILS # BLD AUTO: 4.54 THOUSANDS/ΜL (ref 1.85–7.62)
NEUTROPHILS # BLD AUTO: 4.69 THOUSANDS/ΜL (ref 1.85–7.62)
NEUTROPHILS # BLD AUTO: 4.74 THOUSANDS/ΜL (ref 1.85–7.62)
NEUTROPHILS # BLD AUTO: 5.06 THOUSANDS/ΜL (ref 1.85–7.62)
NEUTROPHILS # BLD AUTO: 7.22 THOUSANDS/ΜL (ref 1.85–7.62)
NEUTROPHILS # BLD MANUAL: 20.42 THOUSAND/UL (ref 1.85–7.62)
NEUTS BAND NFR BLD MANUAL: 14 % (ref 0–8)
NEUTS SEG NFR BLD AUTO: 53 % (ref 43–75)
NEUTS SEG NFR BLD AUTO: 63 % (ref 43–75)
NEUTS SEG NFR BLD AUTO: 66 % (ref 43–75)
NEUTS SEG NFR BLD AUTO: 67 % (ref 43–75)
NEUTS SEG NFR BLD AUTO: 68 % (ref 43–75)
NEUTS SEG NFR BLD AUTO: 74 % (ref 43–75)
NEUTS SEG NFR BLD AUTO: 83 % (ref 43–75)
NONHDLC SERPL-MCNC: 70 MG/DL
NRBC BLD AUTO-RTO: 0 /100 WBCS
PLATELET # BLD AUTO: 143 THOUSANDS/UL (ref 149–390)
PLATELET # BLD AUTO: 162 THOUSANDS/UL (ref 149–390)
PLATELET # BLD AUTO: 198 THOUSANDS/UL (ref 149–390)
PLATELET # BLD AUTO: 204 THOUSANDS/UL (ref 149–390)
PLATELET # BLD AUTO: 210 THOUSANDS/UL (ref 149–390)
PLATELET # BLD AUTO: 211 THOUSANDS/UL (ref 149–390)
PLATELET # BLD AUTO: 334 THOUSANDS/UL (ref 149–390)
PLATELET BLD QL SMEAR: ABNORMAL
PMV BLD AUTO: 10.2 FL (ref 8.9–12.7)
PMV BLD AUTO: 10.2 FL (ref 8.9–12.7)
PMV BLD AUTO: 10.3 FL (ref 8.9–12.7)
PMV BLD AUTO: 9.6 FL (ref 8.9–12.7)
PMV BLD AUTO: 9.7 FL (ref 8.9–12.7)
PMV BLD AUTO: 9.9 FL (ref 8.9–12.7)
PMV BLD AUTO: 9.9 FL (ref 8.9–12.7)
POTASSIUM SERPL-SCNC: 3.1 MMOL/L (ref 3.5–5.3)
POTASSIUM SERPL-SCNC: 3.3 MMOL/L (ref 3.5–5.3)
POTASSIUM SERPL-SCNC: 3.7 MMOL/L (ref 3.5–5.3)
POTASSIUM SERPL-SCNC: 3.7 MMOL/L (ref 3.5–5.3)
POTASSIUM SERPL-SCNC: 3.8 MMOL/L (ref 3.5–5.3)
POTASSIUM SERPL-SCNC: 3.8 MMOL/L (ref 3.5–5.3)
PROT SERPL-MCNC: 6.3 G/DL (ref 6.4–8.2)
PROT SERPL-MCNC: 6.3 G/DL (ref 6.4–8.2)
PROT SERPL-MCNC: 6.4 G/DL (ref 6.4–8.2)
PROT SERPL-MCNC: 6.7 G/DL (ref 6.4–8.2)
PROT SERPL-MCNC: 6.8 G/DL (ref 6.4–8.2)
PROT SERPL-MCNC: 6.9 G/DL (ref 6.4–8.2)
RBC # BLD AUTO: 3.32 MILLION/UL (ref 3.81–5.12)
RBC # BLD AUTO: 3.43 MILLION/UL (ref 3.81–5.12)
RBC # BLD AUTO: 3.49 MILLION/UL (ref 3.81–5.12)
RBC # BLD AUTO: 3.49 MILLION/UL (ref 3.81–5.12)
RBC # BLD AUTO: 3.54 MILLION/UL (ref 3.81–5.12)
RBC # BLD AUTO: 3.83 MILLION/UL (ref 3.81–5.12)
RBC # BLD AUTO: 3.83 MILLION/UL (ref 3.81–5.12)
RBC MORPH BLD: NORMAL
SODIUM SERPL-SCNC: 139 MMOL/L (ref 136–145)
SODIUM SERPL-SCNC: 140 MMOL/L (ref 136–145)
SODIUM SERPL-SCNC: 141 MMOL/L (ref 136–145)
SODIUM SERPL-SCNC: 142 MMOL/L (ref 136–145)
SODIUM SERPL-SCNC: 143 MMOL/L (ref 136–145)
SODIUM SERPL-SCNC: 144 MMOL/L (ref 136–145)
TRIGL SERPL-MCNC: 58 MG/DL
TSH SERPL DL<=0.05 MIU/L-ACNC: 0.53 UIU/ML (ref 0.36–3.74)
WBC # BLD AUTO: 25.21 THOUSAND/UL (ref 4.31–10.16)
WBC # BLD AUTO: 30.62 THOUSAND/UL (ref 4.31–10.16)
WBC # BLD AUTO: 6.6 THOUSAND/UL (ref 4.31–10.16)
WBC # BLD AUTO: 7.08 THOUSAND/UL (ref 4.31–10.16)
WBC # BLD AUTO: 8.04 THOUSAND/UL (ref 4.31–10.16)
WBC # BLD AUTO: 8.95 THOUSAND/UL (ref 4.31–10.16)
WBC # BLD AUTO: 9.77 THOUSAND/UL (ref 4.31–10.16)

## 2021-01-01 PROCEDURE — 85025 COMPLETE CBC W/AUTO DIFF WBC: CPT | Performed by: INTERNAL MEDICINE

## 2021-01-01 PROCEDURE — 99153 MOD SED SAME PHYS/QHP EA: CPT

## 2021-01-01 PROCEDURE — 85027 COMPLETE CBC AUTOMATED: CPT | Performed by: INTERNAL MEDICINE

## 2021-01-01 PROCEDURE — 36561 INSERT TUNNELED CV CATH: CPT | Performed by: RADIOLOGY

## 2021-01-01 PROCEDURE — G1004 CDSM NDSC: HCPCS

## 2021-01-01 PROCEDURE — 96367 TX/PROPH/DG ADDL SEQ IV INF: CPT

## 2021-01-01 PROCEDURE — 88305 TISSUE EXAM BY PATHOLOGIST: CPT | Performed by: SPECIALIST

## 2021-01-01 PROCEDURE — 99214 OFFICE O/P EST MOD 30 MIN: CPT | Performed by: FAMILY MEDICINE

## 2021-01-01 PROCEDURE — 85007 BL SMEAR W/DIFF WBC COUNT: CPT | Performed by: INTERNAL MEDICINE

## 2021-01-01 PROCEDURE — C1788 PORT, INDWELLING, IMP: HCPCS

## 2021-01-01 PROCEDURE — 36415 COLL VENOUS BLD VENIPUNCTURE: CPT

## 2021-01-01 PROCEDURE — 83036 HEMOGLOBIN GLYCOSYLATED A1C: CPT

## 2021-01-01 PROCEDURE — 99215 OFFICE O/P EST HI 40 MIN: CPT | Performed by: INTERNAL MEDICINE

## 2021-01-01 PROCEDURE — 80053 COMPREHEN METABOLIC PANEL: CPT | Performed by: INTERNAL MEDICINE

## 2021-01-01 PROCEDURE — A9585 GADOBUTROL INJECTION: HCPCS | Performed by: FAMILY MEDICINE

## 2021-01-01 PROCEDURE — 76706 US ABDL AORTA SCREEN AAA: CPT

## 2021-01-01 PROCEDURE — 80061 LIPID PANEL: CPT

## 2021-01-01 PROCEDURE — 86301 IMMUNOASSAY TUMOR CA 19-9: CPT

## 2021-01-01 PROCEDURE — 99205 OFFICE O/P NEW HI 60 MIN: CPT | Performed by: STUDENT IN AN ORGANIZED HEALTH CARE EDUCATION/TRAINING PROGRAM

## 2021-01-01 PROCEDURE — 99152 MOD SED SAME PHYS/QHP 5/>YRS: CPT | Performed by: RADIOLOGY

## 2021-01-01 PROCEDURE — 84443 ASSAY THYROID STIM HORMONE: CPT

## 2021-01-01 PROCEDURE — 88173 CYTOPATH EVAL FNA REPORT: CPT | Performed by: SPECIALIST

## 2021-01-01 PROCEDURE — 77001 FLUOROGUIDE FOR VEIN DEVICE: CPT | Performed by: RADIOLOGY

## 2021-01-01 PROCEDURE — 96413 CHEMO IV INFUSION 1 HR: CPT

## 2021-01-01 PROCEDURE — 99214 OFFICE O/P EST MOD 30 MIN: CPT | Performed by: STUDENT IN AN ORGANIZED HEALTH CARE EDUCATION/TRAINING PROGRAM

## 2021-01-01 PROCEDURE — G0439 PPPS, SUBSEQ VISIT: HCPCS | Performed by: FAMILY MEDICINE

## 2021-01-01 PROCEDURE — 96417 CHEMO IV INFUS EACH ADDL SEQ: CPT

## 2021-01-01 PROCEDURE — 86301 IMMUNOASSAY TUMOR CA 19-9: CPT | Performed by: INTERNAL MEDICINE

## 2021-01-01 PROCEDURE — 85025 COMPLETE CBC W/AUTO DIFF WBC: CPT

## 2021-01-01 PROCEDURE — 99214 OFFICE O/P EST MOD 30 MIN: CPT | Performed by: INTERNAL MEDICINE

## 2021-01-01 PROCEDURE — 36561 INSERT TUNNELED CV CATH: CPT

## 2021-01-01 PROCEDURE — 76937 US GUIDE VASCULAR ACCESS: CPT

## 2021-01-01 PROCEDURE — 80053 COMPREHEN METABOLIC PANEL: CPT

## 2021-01-01 PROCEDURE — 76937 US GUIDE VASCULAR ACCESS: CPT | Performed by: RADIOLOGY

## 2021-01-01 PROCEDURE — 88172 CYTP DX EVAL FNA 1ST EA SITE: CPT | Performed by: SPECIALIST

## 2021-01-01 PROCEDURE — 71260 CT THORAX DX C+: CPT

## 2021-01-01 PROCEDURE — 43238 EGD US FINE NEEDLE BX/ASPIR: CPT | Performed by: INTERNAL MEDICINE

## 2021-01-01 PROCEDURE — 1123F ACP DISCUSS/DSCN MKR DOCD: CPT | Performed by: FAMILY MEDICINE

## 2021-01-01 PROCEDURE — 96377 APPLICATON ON-BODY INJECTOR: CPT

## 2021-01-01 PROCEDURE — 77001 FLUOROGUIDE FOR VEIN DEVICE: CPT

## 2021-01-01 PROCEDURE — 82378 CARCINOEMBRYONIC ANTIGEN: CPT

## 2021-01-01 PROCEDURE — 74176 CT ABD & PELVIS W/O CONTRAST: CPT

## 2021-01-01 PROCEDURE — 99204 OFFICE O/P NEW MOD 45 MIN: CPT | Performed by: INTERNAL MEDICINE

## 2021-01-01 PROCEDURE — 74183 MRI ABD W/O CNTR FLWD CNTR: CPT

## 2021-01-01 PROCEDURE — 99152 MOD SED SAME PHYS/QHP 5/>YRS: CPT

## 2021-01-01 RX ORDER — SODIUM CHLORIDE 9 MG/ML
20 INJECTION, SOLUTION INTRAVENOUS ONCE
Status: CANCELLED | OUTPATIENT
Start: 2022-01-01 | Stop reason: HOSPADM

## 2021-01-01 RX ORDER — HYDROCODONE BITARTRATE AND ACETAMINOPHEN 7.5; 325 MG/1; MG/1
TABLET ORAL
Qty: 180 TABLET | Refills: 0 | Status: SHIPPED | OUTPATIENT
Start: 2021-01-01 | End: 2021-01-01

## 2021-01-01 RX ORDER — SENNA PLUS 8.6 MG/1
1 TABLET ORAL DAILY
Qty: 30 TABLET | Refills: 0 | Status: SHIPPED | OUTPATIENT
Start: 2021-01-01 | End: 2022-01-01 | Stop reason: SDUPTHER

## 2021-01-01 RX ORDER — FENTANYL CITRATE 50 UG/ML
INJECTION, SOLUTION INTRAMUSCULAR; INTRAVENOUS CODE/TRAUMA/SEDATION MEDICATION
Status: COMPLETED | OUTPATIENT
Start: 2021-01-01 | End: 2021-01-01

## 2021-01-01 RX ORDER — SODIUM CHLORIDE 9 MG/ML
20 INJECTION, SOLUTION INTRAVENOUS ONCE
Status: COMPLETED | OUTPATIENT
Start: 2021-01-01 | End: 2021-01-01

## 2021-01-01 RX ORDER — SODIUM CHLORIDE 9 MG/ML
50 INJECTION, SOLUTION INTRAVENOUS CONTINUOUS
Status: DISCONTINUED | OUTPATIENT
Start: 2021-01-01 | End: 2021-01-01 | Stop reason: HOSPADM

## 2021-01-01 RX ORDER — OXYCODONE HYDROCHLORIDE 20 MG/1
TABLET ORAL
Qty: 90 TABLET | Refills: 0
Start: 2021-01-01 | End: 2021-01-01 | Stop reason: SDUPTHER

## 2021-01-01 RX ORDER — OXYCODONE HYDROCHLORIDE 20 MG/1
TABLET ORAL
Qty: 90 TABLET | Refills: 0 | Status: SHIPPED | OUTPATIENT
Start: 2021-01-01 | End: 2021-01-01

## 2021-01-01 RX ORDER — PROPOFOL 10 MG/ML
INJECTION, EMULSION INTRAVENOUS CONTINUOUS PRN
Status: DISCONTINUED | OUTPATIENT
Start: 2021-01-01 | End: 2021-01-01

## 2021-01-01 RX ORDER — ESCITALOPRAM OXALATE 20 MG/1
20 TABLET ORAL DAILY
Qty: 90 TABLET | Refills: 5 | Status: SHIPPED | OUTPATIENT
Start: 2021-01-01 | End: 2022-01-01 | Stop reason: SDUPTHER

## 2021-01-01 RX ORDER — SODIUM CHLORIDE 9 MG/ML
20 INJECTION, SOLUTION INTRAVENOUS ONCE
Status: CANCELLED | OUTPATIENT
Start: 2021-01-01

## 2021-01-01 RX ORDER — MIDAZOLAM HYDROCHLORIDE 2 MG/2ML
INJECTION, SOLUTION INTRAMUSCULAR; INTRAVENOUS CODE/TRAUMA/SEDATION MEDICATION
Status: COMPLETED | OUTPATIENT
Start: 2021-01-01 | End: 2021-01-01

## 2021-01-01 RX ORDER — OXYCODONE HYDROCHLORIDE 20 MG/1
10-20 TABLET ORAL EVERY 4 HOURS PRN
Qty: 90 TABLET | Refills: 0 | Status: SHIPPED | OUTPATIENT
Start: 2021-01-01 | End: 2022-01-01 | Stop reason: SDUPTHER

## 2021-01-01 RX ORDER — SODIUM CHLORIDE 9 MG/ML
125 INJECTION, SOLUTION INTRAVENOUS CONTINUOUS
Status: DISCONTINUED | OUTPATIENT
Start: 2021-01-01 | End: 2021-01-01 | Stop reason: HOSPADM

## 2021-01-01 RX ORDER — SODIUM CHLORIDE 9 MG/ML
20 INJECTION, SOLUTION INTRAVENOUS ONCE
Status: CANCELLED | OUTPATIENT
Start: 2022-01-01

## 2021-01-01 RX ORDER — PROCHLORPERAZINE MALEATE 10 MG
10 TABLET ORAL EVERY 6 HOURS PRN
Qty: 30 TABLET | Refills: 0 | Status: SHIPPED | OUTPATIENT
Start: 2021-01-01 | End: 2021-01-01 | Stop reason: SDUPTHER

## 2021-01-01 RX ORDER — LIDOCAINE HYDROCHLORIDE 10 MG/ML
INJECTION, SOLUTION EPIDURAL; INFILTRATION; INTRACAUDAL; PERINEURAL AS NEEDED
Status: DISCONTINUED | OUTPATIENT
Start: 2021-01-01 | End: 2021-01-01

## 2021-01-01 RX ORDER — ONDANSETRON 4 MG/1
4 TABLET, FILM COATED ORAL EVERY 8 HOURS PRN
Qty: 30 TABLET | Refills: 0 | Status: SHIPPED | OUTPATIENT
Start: 2021-01-01 | End: 2021-01-01 | Stop reason: SDUPTHER

## 2021-01-01 RX ORDER — OXYCODONE HYDROCHLORIDE 20 MG/1
10-20 TABLET ORAL EVERY 4 HOURS PRN
Qty: 90 TABLET | Refills: 0
Start: 2021-01-01 | End: 2021-01-01 | Stop reason: SDUPTHER

## 2021-01-01 RX ORDER — SODIUM CHLORIDE 9 MG/ML
INJECTION, SOLUTION INTRAVENOUS CONTINUOUS PRN
Status: DISCONTINUED | OUTPATIENT
Start: 2021-01-01 | End: 2021-01-01

## 2021-01-01 RX ORDER — POLYETHYLENE GLYCOL 3350 17 G/17G
17 POWDER, FOR SOLUTION ORAL DAILY PRN
Qty: 20 EACH | Refills: 0 | Status: SHIPPED | OUTPATIENT
Start: 2021-01-01 | End: 2022-01-01

## 2021-01-01 RX ORDER — PROPOFOL 10 MG/ML
INJECTION, EMULSION INTRAVENOUS AS NEEDED
Status: DISCONTINUED | OUTPATIENT
Start: 2021-01-01 | End: 2021-01-01

## 2021-01-01 RX ORDER — PROCHLORPERAZINE MALEATE 10 MG
10 TABLET ORAL EVERY 6 HOURS PRN
Qty: 30 TABLET | Refills: 0 | Status: SHIPPED | OUTPATIENT
Start: 2021-01-01 | End: 2022-01-01 | Stop reason: SDUPTHER

## 2021-01-01 RX ORDER — CEFAZOLIN SODIUM 1 G/50ML
1000 SOLUTION INTRAVENOUS ONCE
Status: COMPLETED | OUTPATIENT
Start: 2021-01-01 | End: 2021-01-01

## 2021-01-01 RX ORDER — ONDANSETRON 4 MG/1
4 TABLET, FILM COATED ORAL EVERY 8 HOURS PRN
Qty: 30 TABLET | Refills: 0 | Status: SHIPPED | OUTPATIENT
Start: 2021-01-01 | End: 2022-01-01 | Stop reason: SDUPTHER

## 2021-01-01 RX ADMIN — FENTANYL CITRATE 50 MCG: 50 INJECTION, SOLUTION INTRAMUSCULAR; INTRAVENOUS at 11:43

## 2021-01-01 RX ADMIN — TOPICAL ANESTHETIC 1 SPRAY: 200 SPRAY DENTAL; PERIODONTAL at 09:43

## 2021-01-01 RX ADMIN — DEXAMETHASONE SODIUM PHOSPHATE 10 MG: 10 INJECTION, SOLUTION INTRAMUSCULAR; INTRAVENOUS at 10:16

## 2021-01-01 RX ADMIN — Medication 3 ML: at 11:39

## 2021-01-01 RX ADMIN — Medication 157 MG: at 14:52

## 2021-01-01 RX ADMIN — IOHEXOL 85 ML: 350 INJECTION, SOLUTION INTRAVENOUS at 09:16

## 2021-01-01 RX ADMIN — PROPOFOL 100 MG: 10 INJECTION, EMULSION INTRAVENOUS at 09:50

## 2021-01-01 RX ADMIN — PROPOFOL 50 MG: 10 INJECTION, EMULSION INTRAVENOUS at 09:56

## 2021-01-01 RX ADMIN — SODIUM CHLORIDE 50 ML/HR: 0.9 INJECTION, SOLUTION INTRAVENOUS at 10:47

## 2021-01-01 RX ADMIN — SODIUM CHLORIDE 20 ML/HR: 9 INJECTION, SOLUTION INTRAVENOUS at 14:00

## 2021-01-01 RX ADMIN — GEMCITABINE 1570 MG: 38 INJECTION, SOLUTION INTRAVENOUS at 15:54

## 2021-01-01 RX ADMIN — Medication 157 MG: at 14:06

## 2021-01-01 RX ADMIN — FENTANYL CITRATE 50 MCG: 50 INJECTION, SOLUTION INTRAMUSCULAR; INTRAVENOUS at 11:26

## 2021-01-01 RX ADMIN — SODIUM CHLORIDE: 9 INJECTION, SOLUTION INTRAVENOUS at 09:43

## 2021-01-01 RX ADMIN — LIDOCAINE HYDROCHLORIDE 50 MG: 10 INJECTION, SOLUTION EPIDURAL; INFILTRATION; INTRACAUDAL; PERINEURAL at 09:50

## 2021-01-01 RX ADMIN — MIDAZOLAM HYDROCHLORIDE 1 MG: 1 INJECTION, SOLUTION INTRAMUSCULAR; INTRAVENOUS at 11:26

## 2021-01-01 RX ADMIN — Medication 157 MG: at 10:59

## 2021-01-01 RX ADMIN — MIDAZOLAM HYDROCHLORIDE 1 MG: 1 INJECTION, SOLUTION INTRAMUSCULAR; INTRAVENOUS at 11:43

## 2021-01-01 RX ADMIN — SODIUM CHLORIDE 20 ML/HR: 0.9 INJECTION, SOLUTION INTRAVENOUS at 14:02

## 2021-01-01 RX ADMIN — PEGFILGRASTIM 6 MG: KIT SUBCUTANEOUS at 16:02

## 2021-01-01 RX ADMIN — GEMCITABINE 1570 MG: 38 INJECTION, SOLUTION INTRAVENOUS at 12:09

## 2021-01-01 RX ADMIN — SODIUM CHLORIDE 125 ML/HR: 0.9 INJECTION, SOLUTION INTRAVENOUS at 09:14

## 2021-01-01 RX ADMIN — SODIUM CHLORIDE 20 ML/HR: 9 INJECTION, SOLUTION INTRAVENOUS at 10:16

## 2021-01-01 RX ADMIN — CEFAZOLIN SODIUM 1000 MG: 1 SOLUTION INTRAVENOUS at 10:46

## 2021-01-01 RX ADMIN — Medication 3 ML: at 11:36

## 2021-01-01 RX ADMIN — IOHEXOL 100 ML: 350 INJECTION, SOLUTION INTRAVENOUS at 08:48

## 2021-01-01 RX ADMIN — DEXAMETHASONE SODIUM PHOSPHATE 10 MG: 10 INJECTION, SOLUTION INTRAMUSCULAR; INTRAVENOUS at 13:32

## 2021-01-01 RX ADMIN — GEMCITABINE 1570 MG: 38 INJECTION, SOLUTION INTRAVENOUS at 15:26

## 2021-01-01 RX ADMIN — GADOBUTROL 6 ML: 604.72 INJECTION INTRAVENOUS at 13:27

## 2021-01-01 RX ADMIN — PEGFILGRASTIM 6 MG: KIT SUBCUTANEOUS at 12:41

## 2021-01-01 RX ADMIN — DEXAMETHASONE SODIUM PHOSPHATE 10 MG: 10 INJECTION, SOLUTION INTRAMUSCULAR; INTRAVENOUS at 14:01

## 2021-01-01 RX ADMIN — PROPOFOL 120 MCG/KG/MIN: 10 INJECTION, EMULSION INTRAVENOUS at 09:50

## 2021-01-12 ENCOUNTER — TELEPHONE (OUTPATIENT)
Dept: FAMILY MEDICINE CLINIC | Facility: CLINIC | Age: 81
End: 2021-01-12

## 2021-01-12 NOTE — TELEPHONE ENCOUNTER
Spoke with pt to notify her that labs were in (she said she thought there had been some, but wasn't given any pprwk at check out on 8/24) and that she can go to any [facility] to have it done and they will be able to pull it up in computer  Also made sure she knew that one of the tests require fasting

## 2021-01-12 NOTE — TELEPHONE ENCOUNTER
T/c with pt  She is scheduled for a 6 mo f/u on 2/22/21  She would like to know if there is any bw that needs to be done ahead of the appt? If so, can it be ordered so that she can go in time       P#: [phone]

## 2021-01-29 ENCOUNTER — IMMUNIZATIONS (OUTPATIENT)
Dept: FAMILY MEDICINE CLINIC | Facility: HOSPITAL | Age: 81
End: 2021-01-29

## 2021-01-29 DIAGNOSIS — Z23 ENCOUNTER FOR IMMUNIZATION: Primary | ICD-10-CM

## 2021-01-29 PROCEDURE — 91301 SARS-COV-2 / COVID-19 MRNA VACCINE (MODERNA) 100 MCG: CPT

## 2021-01-29 PROCEDURE — 0011A SARS-COV-2 / COVID-19 MRNA VACCINE (MODERNA) 100 MCG: CPT

## 2021-02-16 ENCOUNTER — LAB (OUTPATIENT)
Dept: LAB | Facility: HOSPITAL | Age: 81
End: 2021-02-16
Attending: FAMILY MEDICINE
Payer: MEDICARE

## 2021-02-16 DIAGNOSIS — R73.01 IMPAIRED FASTING GLUCOSE: ICD-10-CM

## 2021-02-16 DIAGNOSIS — E78.2 MIXED HYPERLIPIDEMIA: ICD-10-CM

## 2021-02-16 LAB
ALBUMIN SERPL BCP-MCNC: 4.1 G/DL (ref 3.5–5)
ALP SERPL-CCNC: 84 U/L (ref 46–116)
ALT SERPL W P-5'-P-CCNC: 24 U/L (ref 12–78)
ANION GAP SERPL CALCULATED.3IONS-SCNC: 14 MMOL/L (ref 4–13)
AST SERPL W P-5'-P-CCNC: 19 U/L (ref 5–45)
BASOPHILS # BLD AUTO: 0.07 THOUSANDS/ΜL (ref 0–0.1)
BASOPHILS NFR BLD AUTO: 1 % (ref 0–1)
BILIRUB SERPL-MCNC: 0.8 MG/DL (ref 0.2–1)
BUN SERPL-MCNC: 16 MG/DL (ref 5–25)
CALCIUM SERPL-MCNC: 9.9 MG/DL (ref 8.3–10.1)
CHLORIDE SERPL-SCNC: 105 MMOL/L (ref 100–108)
CHOLEST SERPL-MCNC: 170 MG/DL (ref 50–200)
CO2 SERPL-SCNC: 24 MMOL/L (ref 21–32)
CREAT SERPL-MCNC: 0.9 MG/DL (ref 0.6–1.3)
EOSINOPHIL # BLD AUTO: 0.14 THOUSAND/ΜL (ref 0–0.61)
EOSINOPHIL NFR BLD AUTO: 1 % (ref 0–6)
ERYTHROCYTE [DISTWIDTH] IN BLOOD BY AUTOMATED COUNT: 12.7 % (ref 11.6–15.1)
EST. AVERAGE GLUCOSE BLD GHB EST-MCNC: 123 MG/DL
GFR SERPL CREATININE-BSD FRML MDRD: 61 ML/MIN/1.73SQ M
GLUCOSE P FAST SERPL-MCNC: 130 MG/DL (ref 65–99)
HBA1C MFR BLD: 5.9 %
HCT VFR BLD AUTO: 44 % (ref 34.8–46.1)
HDLC SERPL-MCNC: 91 MG/DL
HGB BLD-MCNC: 14.6 G/DL (ref 11.5–15.4)
IMM GRANULOCYTES # BLD AUTO: 0.19 THOUSAND/UL (ref 0–0.2)
IMM GRANULOCYTES NFR BLD AUTO: 1 % (ref 0–2)
LDLC SERPL CALC-MCNC: 63 MG/DL (ref 0–100)
LYMPHOCYTES # BLD AUTO: 4.25 THOUSANDS/ΜL (ref 0.6–4.47)
LYMPHOCYTES NFR BLD AUTO: 28 % (ref 14–44)
MCH RBC QN AUTO: 32.6 PG (ref 26.8–34.3)
MCHC RBC AUTO-ENTMCNC: 33.2 G/DL (ref 31.4–37.4)
MCV RBC AUTO: 98 FL (ref 82–98)
MONOCYTES # BLD AUTO: 1.16 THOUSAND/ΜL (ref 0.17–1.22)
MONOCYTES NFR BLD AUTO: 8 % (ref 4–12)
NEUTROPHILS # BLD AUTO: 9.24 THOUSANDS/ΜL (ref 1.85–7.62)
NEUTS SEG NFR BLD AUTO: 61 % (ref 43–75)
NONHDLC SERPL-MCNC: 79 MG/DL
NRBC BLD AUTO-RTO: 0 /100 WBCS
PLATELET # BLD AUTO: 327 THOUSANDS/UL (ref 149–390)
PMV BLD AUTO: 9.5 FL (ref 8.9–12.7)
POTASSIUM SERPL-SCNC: 3.7 MMOL/L (ref 3.5–5.3)
PROT SERPL-MCNC: 7.6 G/DL (ref 6.4–8.2)
RBC # BLD AUTO: 4.48 MILLION/UL (ref 3.81–5.12)
SODIUM SERPL-SCNC: 143 MMOL/L (ref 136–145)
TRIGL SERPL-MCNC: 81 MG/DL
WBC # BLD AUTO: 15.05 THOUSAND/UL (ref 4.31–10.16)

## 2021-02-16 PROCEDURE — 36415 COLL VENOUS BLD VENIPUNCTURE: CPT

## 2021-02-16 PROCEDURE — 85025 COMPLETE CBC W/AUTO DIFF WBC: CPT

## 2021-02-16 PROCEDURE — 80061 LIPID PANEL: CPT

## 2021-02-16 PROCEDURE — 83036 HEMOGLOBIN GLYCOSYLATED A1C: CPT

## 2021-02-16 PROCEDURE — 80053 COMPREHEN METABOLIC PANEL: CPT

## 2021-02-26 DIAGNOSIS — M51.36 DDD (DEGENERATIVE DISC DISEASE), LUMBAR: ICD-10-CM

## 2021-02-26 RX ORDER — TRAMADOL HYDROCHLORIDE 50 MG/1
TABLET ORAL
Qty: 100 TABLET | Refills: 5 | Status: SHIPPED | OUTPATIENT
Start: 2021-02-26 | End: 2021-07-19 | Stop reason: SDUPTHER

## 2021-02-26 NOTE — TELEPHONE ENCOUNTER
Medication:  PDMP   02/20/2021  1  10/12/2020  TRAMADOL HCL 50 MG TABLET  100 0  12  [facility]  [id number]  [facility] ([id number])  5  41 67 MME  [place]    01/25/2021  1  10/12/2020  TRAMADOL HCL 50 MG TABLET  100 0  12  [facility]  [id number]  [facility] ([id number])  4  41 67 MME  [place]    12/23/2020  1  10/12/2020  TRAMADOL HCL 50 MG TABLET  100 0  12  [facility]  [id number]  [facility] ([id number])  3  41 67 MME  [place]    12/22/2020  1  12/21/2020  HYDROCODONE-ACETAMIN 7 5-325  180 0  30  [facility]  [id number]  [facility] ([id number])  0  45  0 MME  [place]  [place]       Active agreement on file -No

## 2021-03-06 ENCOUNTER — IMMUNIZATIONS (OUTPATIENT)
Dept: FAMILY MEDICINE CLINIC | Facility: HOSPITAL | Age: 81
End: 2021-03-06

## 2021-03-06 DIAGNOSIS — Z23 ENCOUNTER FOR IMMUNIZATION: Primary | ICD-10-CM

## 2021-03-06 PROCEDURE — 91301 SARS-COV-2 / COVID-19 MRNA VACCINE (MODERNA) 100 MCG: CPT

## 2021-03-06 PROCEDURE — 0012A SARS-COV-2 / COVID-19 MRNA VACCINE (MODERNA) 100 MCG: CPT

## 2021-04-06 ENCOUNTER — OFFICE VISIT (OUTPATIENT)
Dept: FAMILY MEDICINE CLINIC | Facility: CLINIC | Age: 81
End: 2021-04-06
Payer: MEDICARE

## 2021-04-06 VITALS
HEART RATE: 81 BPM | TEMPERATURE: 98.2 F | HEIGHT: 67 IN | DIASTOLIC BLOOD PRESSURE: 70 MMHG | WEIGHT: 135.6 LBS | BODY MASS INDEX: 21.28 KG/M2 | OXYGEN SATURATION: 95 % | SYSTOLIC BLOOD PRESSURE: 138 MMHG

## 2021-04-06 DIAGNOSIS — D72.829 LEUKOCYTOSIS, UNSPECIFIED TYPE: ICD-10-CM

## 2021-04-06 DIAGNOSIS — I10 ESSENTIAL HYPERTENSION: ICD-10-CM

## 2021-04-06 DIAGNOSIS — R73.01 IMPAIRED FASTING GLUCOSE: ICD-10-CM

## 2021-04-06 DIAGNOSIS — K86.1 CHRONIC PANCREATITIS, UNSPECIFIED PANCREATITIS TYPE (HCC): Primary | ICD-10-CM

## 2021-04-06 DIAGNOSIS — F32.9 REACTIVE DEPRESSION: ICD-10-CM

## 2021-04-06 DIAGNOSIS — E78.2 MIXED HYPERLIPIDEMIA: ICD-10-CM

## 2021-04-06 DIAGNOSIS — M51.36 DDD (DEGENERATIVE DISC DISEASE), LUMBAR: ICD-10-CM

## 2021-04-06 DIAGNOSIS — K04.7 DENTAL ABSCESS: ICD-10-CM

## 2021-04-06 PROCEDURE — 99214 OFFICE O/P EST MOD 30 MIN: CPT | Performed by: FAMILY MEDICINE

## 2021-04-06 RX ORDER — HYDROCODONE BITARTRATE AND ACETAMINOPHEN 7.5; 325 MG/1; MG/1
TABLET ORAL
Qty: 180 TABLET | Refills: 0 | Status: SHIPPED | OUTPATIENT
Start: 2021-04-06 | End: 2021-07-19 | Stop reason: SDUPTHER

## 2021-04-06 RX ORDER — HYDROCODONE BITARTRATE AND ACETAMINOPHEN 7.5; 325 MG/1; MG/1
TABLET ORAL
Qty: 180 TABLET | Refills: 0 | Status: CANCELLED | OUTPATIENT
Start: 2021-04-06

## 2021-04-06 RX ORDER — AMOXICILLIN 500 MG/1
500 CAPSULE ORAL EVERY 8 HOURS SCHEDULED
Qty: 30 CAPSULE | Refills: 0 | Status: SHIPPED | OUTPATIENT
Start: 2021-04-06 | End: 2021-04-16

## 2021-04-06 RX ORDER — ESCITALOPRAM OXALATE 10 MG/1
10 TABLET ORAL DAILY
Qty: 30 TABLET | Refills: 5 | Status: SHIPPED | OUTPATIENT
Start: 2021-04-06 | End: 2021-01-01 | Stop reason: SDUPTHER

## 2021-04-06 NOTE — PROGRESS NOTES
Depression Screening and Follow-up Plan: Patient's depression screening was positive with a PHQ-2 score of 3  Their PHQ-9 score was 9  Patient assessed for underlying major depression  Brief counseling provided and recommend additional follow-up/re-evaluation next office visit  Assessment/Plan:   return visit in 4 months  Problem List Items Addressed This Visit        Digestive    Chronic pancreatitis ([address] ) - Primary    Relevant Medications    pancrelipase, Lip-Prot-Amyl, (CREON) 12,000 units capsule       Endocrine    Impaired fasting glucose       Low carb diet            Cardiovascular and Mediastinum    Essential hypertension       Continue losartan 50 mg daily            Musculoskeletal and Integument    DDD (degenerative disc disease), lumbar    Relevant Medications    HYDROcodone-acetaminophen (NORCO) 7 5-325 mg per tablet       Other    Mixed hyperlipidemia      Continue Lipitor  20 mg daily         Reactive depression    Relevant Medications    escitalopram (LEXAPRO) 10 mg tablet    Leukocytosis      Most likely due to dental abscess  Will repeat CBC after course of amoxicillin  Relevant Orders    CBC and differential      Other Visit Diagnoses     Dental abscess        Relevant Medications    amoxicillin (AMOXIL) 500 mg capsule            Subjective:      Patient ID: Rene Abel is a 68 y o  female  Patient comes in for checkup  She is very distraught over the recent loss of her   She also is having dental problems which required teeth extractions        The following portions of the patient's history were reviewed and updated as appropriate:   Past Medical History:  She has a past medical history of Deviated nasal septum, H/O colonoscopy, and Impaired fasting glucose ,  _______________________________________________________________________  Medical Problems:  does not have any pertinent problems on file ,  _______________________________________________________________________  Past Surgical History:   has a past surgical history that includes Appendectomy; Cholecystectomy; Exploratory laparotomy; and Tubal ligation  ,  _______________________________________________________________________  Family History:  family history includes Coronary artery disease in her father; Multiple myeloma in her mother; Stroke in her father ,  _______________________________________________________________________  Social History:   reports that she has been smoking  She has never used smokeless tobacco  She reports current alcohol use  She reports that she does not use drugs  ,  _______________________________________________________________________  Allergies:  is allergic to morphine     _______________________________________________________________________  Current Outpatient Medications   Medication Sig Dispense Refill    atorvastatin (LIPITOR) 20 mg tablet Take 1 tablet (20 mg total) by mouth daily 90 tablet 3    fluticasone (FLONASE) 50 mcg/act nasal spray 2 sprays into each nostril daily      HYDROcodone-acetaminophen (NORCO) 7 5-325 mg per tablet 2  Tid for pain 180 tablet 0     MG tablet TAKE 1 TABLET (600 MG TOTAL) BY MOUTH 3 (THREE) TIMES A  tablet 3    losartan (COZAAR) 50 mg tablet Take 1 tablet (50 mg total) by mouth daily 90 tablet 3    traMADol (ULTRAM) 50 mg tablet 1-2 q 6 prn pain 100 tablet 5    amoxicillin (AMOXIL) 500 mg capsule Take 1 capsule (500 mg total) by mouth every 8 (eight) hours for 10 days 30 capsule 0    escitalopram (LEXAPRO) 10 mg tablet Take 1 tablet (10 mg total) by mouth daily 30 tablet 5    pancrelipase, Lip-Prot-Amyl, (CREON) 12,000 units capsule Take 12,000 units of lipase by mouth 3 (three) times a day with meals 270 capsule 3     No current facility-administered medications for this visit  _______________________________________________________________________  Review of Systems   Constitutional: Negative  Respiratory: Negative  Cardiovascular: Negative  Musculoskeletal: Positive for back pain and gait problem  Psychiatric/Behavioral: Positive for dysphoric mood  Objective:  Vitals:    04/06/21 1033   BP: 138/70   BP Location: Left arm   Patient Position: Sitting   Cuff Size: Adult   Pulse: 81   Temp: 98 2 °F (36 8 °C)   TempSrc: Tympanic   SpO2: 95%   Weight: 61 5 kg (135 lb 9 6 oz)   Height: 5' 7" (1 702 m)     Body mass index is 21 24 kg/m²  Physical Exam  Constitutional:       Appearance: Normal appearance  She is well-developed  HENT:      Head: Normocephalic and atraumatic  Right Ear: External ear normal       Left Ear: External ear normal    Eyes:      Pupils: Pupils are equal, round, and reactive to light  Neck:      Musculoskeletal: Neck supple  Thyroid: No thyromegaly  Cardiovascular:      Rate and Rhythm: Normal rate and regular rhythm  Heart sounds: Normal heart sounds  Pulmonary:      Effort: Pulmonary effort is normal       Breath sounds: Normal breath sounds  Abdominal:      Palpations: Abdomen is soft  Musculoskeletal: Normal range of motion  Lymphadenopathy:      Cervical: No cervical adenopathy  Skin:     General: Skin is warm and dry  Neurological:      Mental Status: She is alert and oriented to person, place, and time     Psychiatric:      Comments: Depressed mood

## 2021-04-06 NOTE — PATIENT INSTRUCTIONS
Basic Carbohydrate Counting   WHAT YOU NEED TO KNOW:   Carbohydrate counting is a way to plan your meals by counting the amount of carbohydrate in foods  Carbohydrates are the sugars, starches, and fiber found in fruit, grains, vegetables, and milk products  Carbohydrates increase your blood sugar levels  Carbohydrate counting can help you eat the right amount of carbohydrate to keep your blood sugar levels under control  DISCHARGE INSTRUCTIONS:   What you need to know about planning meals using carbohydrate counting:  · A dietitian or healthcare provider will help you develop a healthy meal plan that works best for you  You will be taught how much carbohydrate to eat or drink for each meal and snack  Your meal plan will be based on your age, weight, usual food intake, and physical activity level  If you have diabetes, it will also include your blood sugar levels and diabetes medicine  Once you know how much carbohydrate you should eat, you can decide what type of food you want to eat  · You will need to know what foods contain carbohydrate and how much they contain  Keep track of the amount of carbohydrate in meals and snacks in order to follow your meal plan  Do not avoid carbohydrates or skip meals  Your blood sugar may fall too low if you do not eat enough carbohydrate or you skip meals  Foods that contain carbohydrate:   · Breads:  Each serving of food listed below contains about 15 g of carbohydrate   ? 1 slice of bread (1 ounce) or 1 flour or corn tortilla (6 inch)    ? ½ of a hamburger bun or ¼ of a large bagel (about 1 ounce)    ? 1 pancake (about 4 inches across and ¼ inch thick)    · Cereals and grains:  Serving sizes of ready-to-eat cereals vary  Look at the serving size and the total carbohydrate amount listed on the food label  Each serving of food listed below contains about 15 g of carbohydrate   ? ¾ cup of dry, unsweetened, ready-to-eat cereal or ¼ cup of low-fat granola     ?  ½ cup of oatmeal or other cooked cereal     ? ? cup of cooked rice or pasta    · Starchy vegetables and beans:  Each serving of food listed below contains about 15 g of carbohydrate   ? ½ cup of corn, green peas, sweet potatoes, or mashed potatoes    ? ¼ of a large baked potato    ? ½ cup of beans, lentils, and peas (garbanzo, valentino, kidney, white, split, black-eyed)    · Crackers and snacks:  Each serving of food listed below contains about 15 g of carbohydrate   ? 3 ana cracker squares or 8 animal crackers     ? 6 saltine-type crackers    ? 3 cups of popcorn or ¾ ounce of pretzels, potato chips, or tortilla chips    · Fruit:  Each serving of food listed below contains about 15 g of carbohydrate   ? 1 small (4 ounce) piece of fresh fruit or ¾ to 1 cup of fresh fruit    ? ½ cup of canned or frozen fruit, packed in natural juice    ? ½ cup (4 ounces) of unsweetened fruit juice    ? 2 tablespoons of dried fruit    · Desserts or sugary foods:  Each serving of food listed below contains about 15 g of carbohydrate   ? 2-inch square unfrosted cake or brownie     ? 2 small cookies    ? ½ cup of ice cream, frozen yogurt, or nondairy frozen yogurt    ? ¼ cup of sherbet or sorbet    ? 1 tablespoon of regular syrup, jam, or jelly    ? 2 tablespoons of light syrup    · Milk and yogurt:  Foods from the milk group contain about 12 g of carbohydrate per serving  ? 1 cup of fat-free or low-fat milk    ? 1 cup of soy milk    ? ? cup of fat-free, yogurt sweetened with artificial sweetener    · Non-starchy vegetables:  Each serving contains about 5 g of carbohydrate   Three servings of non-starch vegetables count as 1 carbohydrate serving  ? ½ cup of cooked vegetables or 1 cup of raw vegetables  This includes beets, broccoli, cabbage, cauliflower, cucumber, mushrooms, tomatoes, and zucchini    ?  ½ cup of vegetable juice    How to use carbohydrate counting to plan meals:   · Count carbohydrate amounts using serving sizes: ? Pasta dinner example: You plan to have pasta, tossed salad, and an 8-ounce glass of milk  Your healthcare provider tells you that you may have 4 carbohydrate servings for dinner  One carbohydrate serving of pasta is ? cup  One cup of pasta will equal 3 carbohydrate servings  An 8-ounce glass of milk will count as 1 carbohydrate serving  These amounts of food would equal 4 carbohydrate servings  One cup of tossed salad does not count toward your carbohydrate servings  · Count carbohydrate amounts using food labels:  Find the total amount of carbohydrate in a packaged food by reading the food label  Food labels tell you the serving size of the food and the total carbohydrate amount in each serving  Find the serving size on the food label and then decide how many servings you will eat  Multiply the number of servings you plan to eat by the carbohydrate amount per serving  ? Granola bar snack example: Your meal plan allows you to have 2 carbohydrate servings (30 grams) of carbohydrate for a snack  You plan to eat 1 package of granola bars, which contains 2 bars  According to the food label, the serving size of food in this package is 1 bar  Each serving (1 bar) contains 25 grams of carbohydrate  The total amount of carbohydrate in this package of granola bars would be 50 g  Based on your meal plan, you should eat only 1 bar  Follow up with your healthcare provider as directed:  Write down your questions so you remember to ask them during your visits  © Copyright [address] Information is for End User's use only and may not be sold, redistributed or otherwise used for commercial purposes  All illustrations and images included in CareNotes® are the copyrighted property of A D A M , Inc  or [facility] Jaseil Mahoney   The above information is an  only  It is not intended as medical advice for individual conditions or treatments   Talk to your doctor, nurse or pharmacist before following any medical regimen to see if it is safe and effective for you

## 2021-05-10 DIAGNOSIS — I10 ESSENTIAL HYPERTENSION: ICD-10-CM

## 2021-05-10 DIAGNOSIS — E78.2 MIXED HYPERLIPIDEMIA: ICD-10-CM

## 2021-05-11 RX ORDER — ATORVASTATIN CALCIUM 20 MG/1
TABLET, FILM COATED ORAL
Qty: 90 TABLET | Refills: 3 | Status: SHIPPED | OUTPATIENT
Start: 2021-05-11 | End: 2022-01-01 | Stop reason: SDUPTHER

## 2021-05-11 RX ORDER — LOSARTAN POTASSIUM 50 MG/1
50 TABLET ORAL DAILY
Qty: 90 TABLET | Refills: 3 | Status: SHIPPED | OUTPATIENT
Start: 2021-05-11 | End: 2022-01-01 | Stop reason: HOSPADM

## 2021-07-19 DIAGNOSIS — M51.36 DDD (DEGENERATIVE DISC DISEASE), LUMBAR: ICD-10-CM

## 2021-07-19 RX ORDER — TRAMADOL HYDROCHLORIDE 50 MG/1
TABLET ORAL
Qty: 100 TABLET | Refills: 5 | Status: SHIPPED | OUTPATIENT
Start: 2021-07-19 | End: 2021-01-01 | Stop reason: CLARIF

## 2021-07-19 RX ORDER — HYDROCODONE BITARTRATE AND ACETAMINOPHEN 7.5; 325 MG/1; MG/1
TABLET ORAL
Qty: 180 TABLET | Refills: 0 | Status: SHIPPED | OUTPATIENT
Start: 2021-07-19 | End: 2021-01-01 | Stop reason: SDUPTHER

## 2021-08-09 PROBLEM — D64.9 ANEMIA: Status: ACTIVE | Noted: 2021-01-01

## 2021-08-09 PROBLEM — K59.03 DRUG-INDUCED CONSTIPATION: Status: ACTIVE | Noted: 2021-01-01

## 2021-08-09 PROBLEM — R63.4 WEIGHT LOSS: Status: ACTIVE | Noted: 2021-01-01

## 2021-08-09 PROBLEM — D72.829 LEUKOCYTOSIS: Status: RESOLVED | Noted: 2021-04-06 | Resolved: 2021-01-01

## 2021-08-09 NOTE — PROGRESS NOTES
Tobacco Cessation Counseling: Tobacco cessation counseling was provided  The patient is sincerely urged to quit consumption of tobacco  She is not ready to quit tobacco    Assessment/Plan:      Return visit in 6 months with fasting blood work prior to visit     Problem List Items Addressed This Visit        Digestive    Chronic pancreatitis ([address] )      Continue Pancrease t i d  with meals         Drug-induced constipation      MiraLax            Endocrine    Impaired fasting glucose      Low carb diet         Relevant Orders    Hemoglobin A1C       Cardiovascular and Mediastinum    Essential hypertension       Musculoskeletal and Integument    DDD (degenerative disc disease), lumbar    Osteoporosis       Other    Mixed hyperlipidemia      Continue Lipitor 20 mg daily         Relevant Orders    Comprehensive metabolic panel    Lipid panel    Tobacco use - Primary    Relevant Orders    US abdominal aorta screening aaa    Reactive depression      Lexapro increased it to 20 mg daily         Relevant Medications    escitalopram (LEXAPRO) 20 mg tablet    Weight loss    Relevant Orders    TSH, 3rd generation with Free T4 reflex    Anemia    Relevant Orders    CBC and differential      Other Visit Diagnoses     Medicare annual wellness visit, subsequent                Subjective:      Patient ID: Raghav Solares is a 80 y o  female  Patient comes in for checkup  She remains very depressed over the loss of her  nearly 1 year ago  She is getting some relief with Lexapro 10 mg daily but continues to have decreased appetite and is losing weight  She complains of ringing in her  Ears  And constipation from narcotic pain medication for her chronic low back pain        The following portions of the patient's history were reviewed and updated as appropriate:   Past Medical History:  She has a past medical history of Deviated nasal septum, H/O colonoscopy, and Impaired fasting glucose ,  _______________________________________________________________________  Medical Problems:  does not have any pertinent problems on file ,  _______________________________________________________________________  Past Surgical History:   has a past surgical history that includes Appendectomy; Cholecystectomy; Exploratory laparotomy; and Tubal ligation  ,  _______________________________________________________________________  Family History:  family history includes Coronary artery disease in her father; Multiple myeloma in her mother; Stroke in her father ,  _______________________________________________________________________  Social History:   reports that she has been smoking cigarettes  She has been smoking about 0 50 packs per day  She has never used smokeless tobacco  She reports current alcohol use  She reports that she does not use drugs  ,  _______________________________________________________________________  Allergies:  is allergic to morphine     _______________________________________________________________________  Current Outpatient Medications   Medication Sig Dispense Refill    atorvastatin (LIPITOR) 20 mg tablet TAKE 1 TABLET EVERY DAY 90 tablet 3    escitalopram (LEXAPRO) 20 mg tablet Take 1 tablet (20 mg total) by mouth daily 90 tablet 5    fluticasone (FLONASE) 50 mcg/act nasal spray 2 sprays into each nostril daily      HYDROcodone-acetaminophen (NORCO) 7 5-325 mg per tablet 2  Tid for pain 180 tablet 0     MG tablet TAKE 1 TABLET (600 MG TOTAL) BY MOUTH 3 (THREE) TIMES A  tablet 3    losartan (COZAAR) 50 mg tablet TAKE 1 TABLET (50 MG TOTAL) BY MOUTH DAILY 90 tablet 3    pancrelipase, Lip-Prot-Amyl, (CREON) 12,000 units capsule Take 12,000 units of lipase by mouth 3 (three) times a day with meals 270 capsule 3    traMADol (ULTRAM) 50 mg tablet 1-2 q 6 prn pain 100 tablet 5     No current facility-administered medications for this visit  _______________________________________________________________________  Review of Systems   Constitutional: Negative  Respiratory: Negative  Cardiovascular: Negative  Gastrointestinal: Positive for constipation  Psychiatric/Behavioral: Positive for dysphoric mood  Objective:  Vitals:    08/09/21 1054   BP: 170/76   BP Location: Left arm   Patient Position: Sitting   Cuff Size: Adult   Pulse: 86   Temp: 97 7 °F (36 5 °C)   TempSrc: Tympanic   SpO2: 97%   Weight: 55 1 kg (121 lb 6 4 oz)   Height: 5' 7" (1 702 m)     Body mass index is 19 01 kg/m²  Physical Exam  Constitutional:       Appearance: She is well-developed  HENT:      Head: Normocephalic and atraumatic  Right Ear: Tympanic membrane, ear canal and external ear normal       Left Ear: Tympanic membrane, ear canal and external ear normal       Nose: Nose normal       Mouth/Throat:      Mouth: Mucous membranes are moist    Eyes:      Pupils: Pupils are equal, round, and reactive to light  Neck:      Thyroid: No thyromegaly  Cardiovascular:      Rate and Rhythm: Normal rate and regular rhythm  Heart sounds: Normal heart sounds  Pulmonary:      Effort: Pulmonary effort is normal       Breath sounds: Normal breath sounds  Abdominal:      Palpations: Abdomen is soft  Musculoskeletal:         General: Normal range of motion  Cervical back: Neck supple  Lymphadenopathy:      Cervical: No cervical adenopathy  Skin:     General: Skin is warm and dry  Neurological:      Mental Status: She is alert and oriented to person, place, and time     Psychiatric:         Mood and Affect: Mood normal          Behavior: Behavior normal

## 2021-08-20 NOTE — TELEPHONE ENCOUNTER
Radiology department called  Pt's US is ready for review and it has significant findings  Toya would you mind to review please  Dr Maude Sauceda is off  Thank you

## 2021-08-30 NOTE — TELEPHONE ENCOUNTER
Test results of CT Abd/pelvis are not in yet and apparently pt was notified at the [address] to make sure if we didn't contact her that she called us by Monday  Email sent to our contact to obtain results

## 2021-09-21 NOTE — TELEPHONE ENCOUNTER
T/c from patient     Patient called to get her results    I do not see the final results      Please advise

## 2021-09-22 NOTE — TELEPHONE ENCOUNTER
T/c from patient regarding referral to Dr Jose Kerns: he is away from the office and will be unable to see her until 10/21 at 9:45 am , She was informed that they will discuss her treatment at that time and schedule for biopsy  She was also placed on a cancellation list there

## 2021-09-22 NOTE — TELEPHONE ENCOUNTER
That is too long to wait  There is a new oncologist Dr Ani Alarcon that may be able to see her sooner here if not she should be seen by another oncologist in the [facility] system  In the [facility]  Statement Selected

## 2021-09-22 NOTE — TELEPHONE ENCOUNTER
New Patient GI Form   Patient Details:  Raz Zapata  1940  [id number]     Background Information:  [address] starts by opening a telephone encounter and gathering the following information   Who is calling to schedule and relationship? Patient   Referring Provider Yu Callahan MD    To which specialty is the referral? Medical Oncology   Reason for Visit? New Diagnosis   Tumor Type? Pancreatic lesion   Is this Cancer or Non-Cancer? Non-Cancer   Is patient aware of the diagnosis? Yes   Is there a confirmed diagnosis from biopsy/tissue reviewed by Pathology? No   Date of Tissue Diagnosis  (If done outside of [facility] please obtain report and slides)  (If no tissue diagnosis, please stop and discuss with Navigator prior to scheduling)     Has Imaging been completed? Yes   If YES, where was the imaging done? (If outside [place] [id number] obtain records) 09/17 at [facility]     Have any endoscopies been done (colonoscopy, EGD, EUS)  No   If YES, where were they performed? (If outside of [facility] obtain the records)     Has blood work been done? Yes   If YES, where was the blood work done? (If outside of [facility] obtain records) 09/10 at [facility]    Is there a personal history of cancer? (If YES please list type)  No   Is there a family history of cancer? (If YES please list type)  Yes - Mother had multiple myeloma    Are records needed from an outside facility? No   If yes, Name, [facility] and [place] where facility is located  Scheduling Information:   [facility]   Are you willing to see another doctor if your visit can be sooner? No   Are there any days the patient cannot be seen?      Miscellaneous:

## 2021-09-23 NOTE — TELEPHONE ENCOUNTER
Spoke with patient about her Oncology Referral and being seen sooner per Dr Maribell Gonzales  She prefers to stay within the [employer] [facility]  Telephone call to the [address] (number provided for Dr Nicole Campos) and spoke with someone who will give the patient information to their Nurse Navigator and they will call her to schedule  After her appt, they will assist her with the Surgical Oncologist if needed  T/c to patient - gave her Dr Janusz Albert information:  Dr Rosa M Suarez, Oncologist/Hematologist [phone] (The [address])  Pt will call us with her appt information once made

## 2021-09-23 NOTE — TELEPHONE ENCOUNTER
Chart reviewed per new patient spreadsheet review  Patient needs a biopsy with IR of pancreatic lesion, CT chest prior to medical oncology appt  If CT chest is negative, she also needs consult with surg onc

## 2021-09-23 NOTE — TELEPHONE ENCOUNTER
Contact informations about Dr Cifuentes's offices [facility] printed, handed directly to Bouchra Thomas to f/u up with pt as requested per pt

## 2021-09-24 NOTE — TELEPHONE ENCOUNTER
Terry Rucker from IR called back to review some information regarding the biopsy request, call back # [phone]

## 2021-09-24 NOTE — PROGRESS NOTES
Phone outreach to patient today  Introduced myself and my role  Explained to patient that our AP team reviewed the referral from Dr Tanna Bonilla to medical oncology and have made a few recommendations for further work up  Reviewed that a CT of the chest and an IR biopsy of the pancreas is recommended  Further explained that IR will review the MRI images and advise if a percutaneous biopsy is possible  If not, we may need to order an EUS to obtain tissue  Grover Fair tells me she has concerns with driving to [facility]  She lives alone, her  is recently   Her only son lives in [place]  She does have friends but is not certain they will be able to drive her farther than local area appointments  Will place consult to social work for current and/or future assistance with supportive services  Advised that we will keep the appointment with Dr Radha Sorenson as is for now on 10/21 until we have the other work up scheduled  She should see medical oncology about 7-10 days after the biopsy  If this needs to adjusted once everything else is schedule I will help with that  Advised I will call Grover Fair back with the CT appointment and she will either hear directly from IR to schedule the biopsy or from me if there is need for an EUS instead  Provided her my contact information, she was very appreciative of the information

## 2021-09-24 NOTE — PROGRESS NOTES
Dion Fernandez returned my call  Reviewed with her the date and time of the CT chest  Reviewed the prep  Informed her that IR reviewed the imaging and have recommended an EUS for tissue biopsy of the pancreatic lesion  Message has been sent to the GI team and their  who will reach out to her directly to schedule  Advised she will be sedated and therefore will need a   She is concerned about this but will start to inquire with friends  Advised she will need to go to [place] for this and the whole process will take a couple of hours  Advised that I will place a referral to our social workers also as she will need some additional support going forward  She was very appreciative of this

## 2021-09-24 NOTE — TELEPHONE ENCOUNTER
Initially contacted patient this morning with this advisement  Dr Virginia Sellers has since received notification from IR that a percutaneous biopsy of the pancreas is not possible  Will refer to GI for EUS as soon as possible  Will call patient with this updated information and an appointment for the CT of the chest  Placing referral to social work as well as patient lives alone with minimal support

## 2021-09-24 NOTE — PROGRESS NOTES
Phone call to patient to provide her with the following appointment:    9/28/21 at 8:30 AM CT chest at [facility]    Also asked that she return my call to review the pancreas biopsy recommendations

## 2021-09-27 NOTE — PROGRESS NOTES
Discussed with patient over phone  She was diagnosed with chronic pancreatitis about 15 years ago  This followed a cardiac catheterization  However, i'm unclear of the details of that  In her younger years she did use to drink Scotch and was told not to drink hard liquor  She was on pancrealipase  She recently has lost her  abotu a year ago  Now the past 10 months she has lost her appetite and now has pain with eating  She has lost about 30 lbs  She had imaging done and MRI shows a pancreatic mass  Will schedule her for an EUS soon  She has some difficulty with finding a drive

## 2021-09-27 NOTE — PROGRESS NOTES
LSW attempted to contact pt for follow up on DT, no answer  LSW will attempt another outreach at a later date

## 2021-09-30 NOTE — TELEPHONE ENCOUNTER
Medication:  PDMP   09/25/2021  1  07/19/2021  TRAMADOL HCL 50 MG TABLET  100 0  12  BR SHERLY  [id number]  [id number]  2  41 67 MME  [place]  [place]    08/31/2021  1  07/19/2021  TRAMADOL HCL 50 MG TABLET  100 0  12  BR SHERLY  [id number]  [id number]  1  41 67 MME  [place]  [place]    07/27/2021  1  07/19/2021  TRAMADOL HCL 50 MG TABLET  100 0  12  BR SHERLY  [id number]  [id number]  0  41 67 MME  [place]    07/19/2021  1  07/19/2021  HYDROCODONE-ACETAMIN 7 5-325  180 0  30  BR SHERLY  [id number]  [id number]  0  45  0 MME  [place]    07/16/2021  1  02/26/2021  TRAMADOL HCL 50 MG TABLET  100 0  12  BR SHERLY  [id number]  [id number]  5  41 67 MME  [place]    06/24/2021  1  02/26/2021  TRAMADOL HCL 50 MG TABLET  100 0  12  BR SHERLY  [id number]  [facility] ([id number])  4  41 67 MME  [place]ic[place]  [place]     Active agreement on file -No

## 2021-10-13 PROBLEM — M48.00 SPINAL STENOSIS: Status: ACTIVE | Noted: 2021-01-01

## 2021-10-13 PROBLEM — F17.200 SMOKING: Status: ACTIVE | Noted: 2021-01-01

## 2021-10-13 PROBLEM — F41.9 ANXIETY: Status: ACTIVE | Noted: 2021-01-01

## 2021-10-25 PROBLEM — C25.9 PANCREATIC ADENOCARCINOMA (HCC): Status: ACTIVE | Noted: 2021-01-01

## 2021-10-25 PROBLEM — Z92.21 AT HIGH RISK FOR INFECTION DUE TO CHEMOTHERAPY: Status: ACTIVE | Noted: 2021-01-01

## 2021-10-25 PROBLEM — Z91.89 AT HIGH RISK FOR INFECTION DUE TO CHEMOTHERAPY: Status: ACTIVE | Noted: 2021-01-01

## 2021-10-25 PROBLEM — Z51.11 ENCOUNTER FOR CHEMOTHERAPY MANAGEMENT: Status: ACTIVE | Noted: 2021-01-01

## 2021-10-25 PROBLEM — E44.1 MILD PROTEIN-CALORIE MALNUTRITION (HCC): Status: ACTIVE | Noted: 2021-01-01

## 2021-11-05 PROBLEM — Z95.828 PORT-A-CATH IN PLACE: Status: ACTIVE | Noted: 2021-01-01

## 2021-11-26 PROBLEM — Z79.899 MEDICAL MARIJUANA USE: Status: ACTIVE | Noted: 2021-01-01

## 2021-12-30 NOTE — PROGRESS NOTES
Outpatient Oncology Nutrition Consultation   Type of Consult: Follow Up  Care Location: [facility]    Reason for referral: Palliative Care on 12/7/21 (reason for referral: pancreatic cancer and weight loss)  Nutrition Assessment:   Oncology Diagnosis & Treatments: Diagnosed with pancreatic cancer 10/25/21  Began chemotherapy (neulasta, abraxane, gemzar) 11/8/21     Oncology History   Pancreatic adenocarcinoma ([facility] )   10/25/2021 Initial Diagnosis    [facility] )     11/8/2021 -  Chemotherapy    pegfilgrastim (NEULASTA ONPRO), 6 mg, Subcutaneous, Once, 2 of 5 cycles  Administration: 6 mg (11/8/2021), 6 mg (12/6/2021)  paclitaxel protein-bound (ABRAXANE) IVPB, 100 mg/m2 = 157 mg (80 % of original dose 125 mg/m2), Intravenous, Once, 3 of 6 cycles  Dose modification: 100 mg/m2 (original dose 125 mg/m2, Cycle 1, Reason: Performance Status), 80 mg/m2 (original dose 125 mg/m2, Cycle 3, Reason: Performance Status)  Administration: 157 mg (11/8/2021), 157 mg (12/6/2021), 157 mg (12/20/2021)  gemcitabine (GEMZAR) infusion, 1,000 mg/m2 = 1,570 mg, Intravenous, Once, 3 of 6 cycles  Dose modification: 800 mg/m2 (original dose 1,000 mg/m2, Cycle 3, Reason: Performance Status)  Administration: 1,570 mg (11/8/2021), 1,570 mg (12/6/2021), 1,570 mg (12/20/2021)       Past Medical & Surgical Hx:   Patient Active Problem List   Diagnosis    DDD (degenerative disc disease), lumbar    Chronic pancreatitis ([facility] )    Mixed hyperlipidemia    Allergic rhinitis    Impaired fasting glucose    Essential hypertension    Tobacco use    Osteoporosis    Reactive depression    Weight loss    Anemia    Drug-induced constipation    Anxiety    Spinal stenosis    Smoking    Pancreatic adenocarcinoma ([facility] )    Encounter for chemotherapy management    Mild protein-calorie malnutrition ([facility] )    At high risk for infection due to chemotherapy    Port-A-Cath in place    Medical marijuana use     Past Medical History:   Diagnosis Date    Anxiety     Chronic pancreatitis ([facility] )     Depression     Deviated nasal septum     H/O colonoscopy     6/7/10    Hyperlipidemia     Hypertension     Impaired fasting glucose     Spinal stenosis      Past Surgical History:   Procedure Laterality Date    APPENDECTOMY      CHOLECYSTECTOMY      EXPLORATORY LAPAROTOMY      IR PORT PLACEMENT  11/2/2021    TUBAL LIGATION         Review of Medications:   Vitamins, Supplements and Herbals: No, pt denies taking supplements    Current Outpatient Medications:     atorvastatin (LIPITOR) 20 mg tablet, TAKE 1 TABLET EVERY DAY, Disp: 90 tablet, Rfl: 3    escitalopram (LEXAPRO) 20 mg tablet, Take 1 tablet (20 mg total) by mouth daily (Patient not taking: Reported on 11/9/2021 ), Disp: 90 tablet, Rfl: 5    fluticasone (FLONASE) 50 mcg/act nasal spray, 2 sprays into each nostril daily, Disp: , Rfl:      MG tablet, TAKE 1 TABLET (600 MG TOTAL) BY MOUTH 3 (THREE) TIMES A DAY (Patient not taking: Reported on 12/27/2021), Disp: 270 tablet, Rfl: 3    losartan (COZAAR) 50 mg tablet, TAKE 1 TABLET (50 MG TOTAL) BY MOUTH DAILY, Disp: 90 tablet, Rfl: 3    lubiprostone (AMITIZA) 24 mcg capsule, Take 1 capsule (24 mcg total) by mouth 2 (two) times a day with meals for 14 days, Disp: 28 capsule, Rfl: 1    ondansetron (ZOFRAN) 4 mg tablet, Take 1 tablet (4 mg total) by mouth every 8 (eight) hours as needed for nausea or vomiting, Disp: 30 tablet, Rfl: 0    oxyCODONE (ROXICODONE) 20 MG TABS, Take 0 5-1 tablets (10-20 mg total) by mouth every 4 (four) hours as needed for moderate pain Max Daily Amount: 120 mg, Disp: 90 tablet, Rfl: 0    pancrelipase, Lip-Prot-Amyl, (CREON) 12,000 units capsule, Take 12,000 units of lipase by mouth 3 (three) times a day with meals, Disp: 270 capsule, Rfl: 3    polyethylene glycol (MIRALAX) 17 g packet, Take 17 g by mouth daily as needed (Constipation), Disp: 20 each, Rfl: 0    prochlorperazine (COMPAZINE) 10 mg tablet, Take 1 tablet (10 mg total) by mouth every 6 (six) hours as needed for nausea or vomiting, Disp: 30 tablet, Rfl: 0    senna (SENOKOT) 8 6 MG tablet, Take 1 tablet (8 6 mg total) by mouth daily, Disp: 30 tablet, Rfl: 0  No current facility-administered medications for this visit      Facility-Administered Medications Ordered in Other Visits:     dexamethasone (DECADRON) 10 mg in sodium chloride 0 9 % 51 mL IVPB, 10 mg, Intravenous, Once, Vel Jiménez MD PhD    gemcitabine [facility]) 1,255 9 mg in sodium chloride 0 9 % 250 mL infusion, 800 mg/m2 (Treatment Plan Recorded), Intravenous, Once, Vel Jiménez MD PhD    PACLitaxel protein bound (ABRAXANE) 126 mg in IVPB 25 2 mL, 80 mg/m2 (Treatment Plan Recorded), Intravenous, Once, Vel Jiménez MD PhD    sodium chloride 0 9 % infusion, 20 mL/hr, Intravenous, Once, Vel Jiménez MD PhD    Most Recent Lab Results:   Lab Results   Component Value Date    WBC 6 60 12/30/2021    NEUTROABS 4 54 12/30/2021    CHOLESTEROL 136 09/10/2021    TRIG 58 09/10/2021    HDL 66 09/10/2021    LDLCALC 58 09/10/2021     (H) 12/30/2021    AST 29 12/30/2021    ALB 3 4 (L) 12/30/2021    SODIUM 140 12/30/2021    SODIUM 142 12/17/2021    K 3 7 12/30/2021    K 3 8 12/17/2021     12/30/2021    BUN 12 12/30/2021    BUN 15 12/17/2021    CREATININE 0 73 12/30/2021    CREATININE 0 75 12/17/2021    EGFR 78 12/30/2021    GLUF 122 (H) 09/10/2021    GLUF 130 (H) 02/16/2021    GLUC 161 (H) 12/30/2021    HGBA1C 5 9 (H) 09/10/2021    HGBA1C 5 9 (H) 02/16/2021    HGBA1C 5 8 04/16/2019    CALCIUM 8 8 12/30/2021       Anthropometric Measurements:   Height: 66"  Ht Readings from Last 1 Encounters:   01/03/22 5' 5 98" (1 676 m)     Wt Readings from Last 20 Encounters:   01/03/22 48 kg (105 lb 12 8 oz)   12/27/21 47 6 kg (105 lb)   12/20/21 49 7 kg (109 lb 9 1 oz)   12/07/21 50 8 kg (112 lb)   12/06/21 50 6 kg (111 lb 8 8 oz)   12/06/21 50 3 kg (111 lb)   11/26/21 49 9 kg (110 lb)   11/09/21 50 7 kg (111 lb 12 8 oz)   11/08/21 50 2 kg (110 lb 10 7 oz)   11/02/21 49 6 kg (109 lb 5 6 oz)   11/02/21 49 4 kg (109 lb)   10/25/21 51 3 kg (113 lb)   10/18/21 51 7 kg (114 lb)   10/13/21 52 2 kg (115 lb)   09/17/21 54 4 kg (120 lb)   08/09/21 55 1 kg (121 lb 6 4 oz)   04/06/21 61 5 kg (135 lb 9 6 oz)   08/24/20 65 3 kg (144 lb)   11/05/19 67 6 kg (149 lb)   05/06/19 66 2 kg (146 lb)       Weight History:    Usual Weight: 145#    Oncology Nutrition-Anthropometrics      Nutrition from 1/3/2022 in [address] [place] [address] Nutrition from 12/20/2021 in [address] [place] [address]   Patient age (years): 80 years 80 years   Patient (female) height (in): 77 in 77 in   Current Weight to be used for anthropometric calculations (lbs) 105 8 lbs 109 6 lbs   Current Weight to be used for anthropometric calculations (kg) 48 1 kg 49 8 kg   BMI: 17 1 17 7   IBW female: 130 lbs 130 lbs   IBW (kg) female: 59 1 kg 59 1 kg   IBW % (female) 81 4 % 84 3 %   Adjusted BW (female): 124 lbs 124 9 lbs   Adjusted BW kg (female): 56 4 kg 56 8 kg   % weight change after 1 week: 0 8 % --   Weight change after 1 week (lbs) 0 8 lbs --   % weight change after 1 month: -5 2 % -0 4 %   Weight change after 1 month (lbs) -5 8 lbs -0 4 lbs   % weight change after 3 months: -8 % -8 7 %   Weight change after 3 months (lbs) -9 2 lbs -10 4 lbs          Nutrition-Focused Physical Findings: severe muscle depletion (Clavicle) - protruding/visible bone    Food/Nutrition-Related History & Client/Social History:    Current Nutrition Impact Symptoms:  [x] Nausea -has zofran  [x] Reduced Appetite -no desire to eat  [] Acid Reflux    [] Vomiting  [x] Unintended Wt Loss  [] Malabsorption    [] Diarrhea  [] Unintended Wt Gain  [] Dumping Syndrome    [x] Constipation -takes Miralax every other day [] Thick Mucous/Secretions  [x] Abdominal Pain   -with food intake  -takes creon    [x] Dysgeusia (Altered Taste)  [] Xerostomia (Dry Mouth)  [] Gas    [x] Dysosmia (Altered Smell)  [] Thrush  [] Difficulty Chewing    [] Oral Mucositis (Sore Mouth)  [x] Fatigue  [x] Hyperglycemia    [] Odynophagia  [] Esophagitis  [] Other:    [] Dysphagia  [] Early Satiety  [] No Problems Eating      Food Allergies & Intolerances: no    Current Diet: Regular Diet, No Restrictions  Current Nutrition Intake: Less than usual   Appetite: Poor  Nutrition Route: PO  Oral Care: brushes BID  Activity level: ADL, goes to work 2 days a week    24 Hr Diet Recall:   Breakfast: oatmeal made with whole milk and butter, sweetened with monkfruit or cheerios   Snack: cookies or chocolate candies  Lunch: usually has to force herself to eat something, will eat toast or PB sandwich  Dinner: shake and baked pork chops, and mashed potatoes    Beverages: iced tea mixed with bottled water, coffee (12oz x1)   Supplements:    None      Oncology Nutrition-Estimated Needs      Nutrition from 1/3/2022 in [address] [place] [address] Nutrition from 2021 in [place] [address] Oncology Dietitian Alfredo   Weight type used IBW IBW   Weight in kilograms (kg) used for estimated needs 59 1 kg 59 1 kg   Energy needs formula:  35-40 kcal/kg 35-40 kcal/kg   Energy needs based on 35 kcal/k kcal 2068 kcal   Energy needs based on 40 kcal/k kcal 2364 kcal   Protein needs formula: 1 5-2 g/kg 1 5-2 g/kg   Protein needs based on 1 5 g/kg 89 g 89 g   Protein needs based on 2 g/kg 118 g 118 g   Fluid needs formula: 30-35 mL/kg 30-35 mL/kg   Fluid needs based on 30 mL/kg 1773 mL 1773 mL   Fluid needs in ounces 60 oz 60 oz   Fluid needs based on 35 mL/kg 2069 mL 2069 mL   Fluid needs in ounces 70 oz 70 oz           Discussion & Intervention:   Emmy Zapien was evaluated today for an RD follow up regarding wt loss and poor po intake  Emmy Zapien is currently undergoing tx for pancreatic cancer   Today Emmy Zapien explains that she continues to force herself to eat due to lack of desire to eat, taste change, abdominal pain with food intake, and some constipation  She does say that her constipation is better managed at this time  Gerhard Hurtado states that she still has more bad days than good days in regards to her appetite/symptoms  Reviewed 24 hour recall, which revealed an inadequate po intake, and discussed ways to increase kcal, protein, and fluid intakes  She has made an effort to consume more high calorie foods  For example peanut butter, butter, whole milk, chocolate candy bars  Reviewed key concepts of a diet through pancreatic cancer treatment, suggestions include:  · Small, frequent snacks may be easier to tolerate than 3 large daily meals  Aim for 5-6 small meals per day; eat every 2-3 hours  · Eat slowly and chew your food very well  · Include protein at all meals/snacks  · Caution with high-fat, greasy or fried foods  · Caution with high sugar foods (ex  >10 grams of sugar per serving)  · Include a variety of foods (as tolerated/allowed by diet)  · Stay hydrated by sipping fluids of choice/tolerance throughout the day, (64 oz  Per day)  Calorie containing beverages  · Refer to Eating Hints book and Diet and Nutrition book for other meal/snack ideas and symptom management  · Monitor your weight for stability  Moving forward, Gerhard Hurtado was encouraged to increase kcal, protein, and fluid intakes  Materials Provided: not applicable  All questions and concerns addressed during todays visit  Gerhard Hurtado has RD contact information  Nutrition Diagnosis:    Inadequate Energy Intake related to physiological causes, disease state and treatment related issues as evidenced by food recall, wt loss and discussion with pt and/or family   Increased Nutrient Needs (kcal & pro) related to increased demand for nutrients and disease state as evidenced by cancer dx and pt undergoing tx for cancer     Patient has clinical indicators (or ASPEN criteria) consistent with severe protein-calorie malnutrition in the context of Chronic Illness as evidenced by >5% wt loss in 1 month and severe muscle depletion (Clavicle) - protruding/visible bone  Monitoring & Evaluation:   Goals:  · weight maintenance/stabilization  · adequate nutrition impact symptom management  · pt to meet >/=75% estimated nutrition needs daily    · Progress Towards Goals: Progressing    Nutrition Rx & Recommendations:  · Diet: High Calorie, High Protein (for high calorie foods see pages 52-53, and for high protein foods see pages 49-51 in your Eating Hints book)  · For constipation: drink plenty of fluids (>64 oz/day); drink hot liquids; eat high-fiber foods as tolerated (whole grains, beans, peas, nuts, seeds, fruits, vegetables, etc); increase physical activity as tolerated  Avoid increasing fiber intake too quickly, add fiber into your diet slowly; keep a record of your bowel movements (see pages 13-14 in you Eating Hints book)  · Small, frequent snacks may be easier to tolerate than 3 large daily meals  Aim for 5-6 small meals per day; eat every 2-3 hours  · Include protein at all meals/snacks  · Avoid high-fat, greasy or fried foods  · Avoid high sugar foods (ex  >10 grams of sugar per serving)  · Include a variety of foods (as tolerated/allowed by diet)  · Stay hydrated by sipping fluids of choice/tolerance throughout the day, (48-64 oz  Per day)  Avoid carbonated beverages  · Liquid nutrition may be better tolerated than solids at times  · Alter food choices and eating patterns to accommodate changing needs  · Light physical activity (such as walking) is encouraged, as able/tolerated  · Refer to Eating Hints book and Diet and Nutrition book for other meal/snack ideas and symptom management  · Weigh yourself regularly  If you notice weight loss, make an effort to increase your daily food/calorie intake  If you continue to notice loss after these efforts, reach out to your dietitian to establish a plan to stabilize weight      Follow Up Plan: During infusion 1/17/22  Recommend Referral to Other Providers: none at this time

## 2021-12-30 NOTE — PROGRESS NOTES
Patient presents for central venous labs  Patient offers no complaints  Port accessed, flushed, saline locked, labs drawn, port flushed and de-accessed  Band-aid placed on site  AVS declined, next appointment reviewed

## 2022-01-01 ENCOUNTER — HOME CARE VISIT (OUTPATIENT)
Dept: HOME HOSPICE | Facility: HOSPICE | Age: 82
End: 2022-01-01
Payer: MEDICARE

## 2022-01-01 ENCOUNTER — HOME CARE VISIT (OUTPATIENT)
Dept: HOME HEALTH SERVICES | Facility: HOME HEALTHCARE | Age: 82
End: 2022-01-01
Payer: MEDICARE

## 2022-01-01 ENCOUNTER — OFFICE VISIT (OUTPATIENT)
Dept: PALLIATIVE MEDICINE | Facility: CLINIC | Age: 82
End: 2022-01-01
Payer: MEDICARE

## 2022-01-01 ENCOUNTER — OFFICE VISIT (OUTPATIENT)
Dept: FAMILY MEDICINE CLINIC | Facility: CLINIC | Age: 82
End: 2022-01-01
Payer: MEDICARE

## 2022-01-01 ENCOUNTER — APPOINTMENT (EMERGENCY)
Dept: CT IMAGING | Facility: HOSPITAL | Age: 82
DRG: 309 | End: 2022-01-01
Payer: MEDICARE

## 2022-01-01 ENCOUNTER — TELEPHONE (OUTPATIENT)
Dept: HEMATOLOGY ONCOLOGY | Facility: CLINIC | Age: 82
End: 2022-01-01

## 2022-01-01 ENCOUNTER — HOSPITAL ENCOUNTER (OUTPATIENT)
Dept: INFUSION CENTER | Facility: CLINIC | Age: 82
Discharge: HOME | End: 2022-01-28
Payer: MEDICARE

## 2022-01-01 ENCOUNTER — HOSPITAL ENCOUNTER (OUTPATIENT)
Dept: INFUSION CENTER | Facility: CLINIC | Age: 82
Discharge: HOME | End: 2022-02-11
Payer: MEDICARE

## 2022-01-01 ENCOUNTER — TELEPHONE (OUTPATIENT)
Dept: HOME HEALTH SERVICES | Facility: HOME HEALTHCARE | Age: 82
End: 2022-01-01

## 2022-01-01 ENCOUNTER — HOSPICE ADMISSION (OUTPATIENT)
Dept: HOME HOSPICE | Facility: HOSPICE | Age: 82
End: 2022-01-01
Payer: MEDICARE

## 2022-01-01 ENCOUNTER — HOSPITAL ENCOUNTER (INPATIENT)
Facility: HOSPITAL | Age: 82
LOS: 4 days | Discharge: HOME | DRG: 309 | End: 2022-01-23
Attending: EMERGENCY MEDICINE | Admitting: INTERNAL MEDICINE
Payer: MEDICARE

## 2022-01-01 ENCOUNTER — HOSPITAL ENCOUNTER (OUTPATIENT)
Dept: INFUSION CENTER | Facility: CLINIC | Age: 82
Discharge: HOME | End: 2022-01-14
Payer: MEDICARE

## 2022-01-01 ENCOUNTER — PATIENT OUTREACH (OUTPATIENT)
Dept: FAMILY MEDICINE CLINIC | Facility: CLINIC | Age: 82
End: 2022-01-01

## 2022-01-01 ENCOUNTER — TELEPHONE (OUTPATIENT)
Dept: SURGICAL ONCOLOGY | Facility: CLINIC | Age: 82
End: 2022-01-01

## 2022-01-01 ENCOUNTER — HOSPITAL ENCOUNTER (OUTPATIENT)
Dept: NON INVASIVE DIAGNOSTICS | Facility: CLINIC | Age: 82
Discharge: HOME | End: 2022-02-03
Payer: MEDICARE

## 2022-01-01 ENCOUNTER — TELEPHONE (OUTPATIENT)
Dept: PALLIATIVE MEDICINE | Facility: CLINIC | Age: 82
End: 2022-01-01

## 2022-01-01 ENCOUNTER — HOSPITAL ENCOUNTER (OUTPATIENT)
Dept: CT IMAGING | Facility: HOSPITAL | Age: 82
Discharge: HOME | DRG: 309 | End: 2022-01-18
Attending: INTERNAL MEDICINE
Payer: MEDICARE

## 2022-01-01 ENCOUNTER — OFFICE VISIT (OUTPATIENT)
Dept: HEMATOLOGY ONCOLOGY | Facility: CLINIC | Age: 82
End: 2022-01-01
Payer: MEDICARE

## 2022-01-01 ENCOUNTER — HOSPITAL ENCOUNTER (OUTPATIENT)
Dept: RADIOLOGY | Age: 82
Discharge: HOME | End: 2022-05-16
Payer: MEDICARE

## 2022-01-01 ENCOUNTER — NUTRITION (OUTPATIENT)
Dept: NUTRITION | Facility: CLINIC | Age: 82
End: 2022-01-01

## 2022-01-01 ENCOUNTER — HOME CARE VISIT (OUTPATIENT)
Dept: HOME HEALTH SERVICES | Facility: HOME HEALTHCARE | Age: 82
End: 2022-01-01

## 2022-01-01 ENCOUNTER — TELEPHONE (OUTPATIENT)
Dept: CARDIOLOGY CLINIC | Facility: CLINIC | Age: 82
End: 2022-01-01

## 2022-01-01 ENCOUNTER — HOSPITAL ENCOUNTER (OUTPATIENT)
Dept: INFUSION CENTER | Facility: CLINIC | Age: 82
Discharge: HOME | End: 2022-01-03
Payer: MEDICARE

## 2022-01-01 ENCOUNTER — HOSPITAL ENCOUNTER (OUTPATIENT)
Dept: INFUSION CENTER | Facility: CLINIC | Age: 82
Discharge: HOME | End: 2022-03-28
Payer: MEDICARE

## 2022-01-01 ENCOUNTER — HOSPITAL ENCOUNTER (OUTPATIENT)
Dept: INFUSION CENTER | Facility: CLINIC | Age: 82
Discharge: HOME | DRG: 309 | End: 2022-01-17
Payer: MEDICARE

## 2022-01-01 ENCOUNTER — CONSULT (OUTPATIENT)
Dept: SURGICAL ONCOLOGY | Facility: CLINIC | Age: 82
End: 2022-01-01
Payer: MEDICARE

## 2022-01-01 ENCOUNTER — HOSPITAL ENCOUNTER (OUTPATIENT)
Dept: INFUSION CENTER | Facility: CLINIC | Age: 82
Discharge: HOME | End: 2022-01-31
Payer: MEDICARE

## 2022-01-01 ENCOUNTER — HOSPITAL ENCOUNTER (EMERGENCY)
Facility: HOSPITAL | Age: 82
Discharge: HOME | End: 2022-07-29
Attending: EMERGENCY MEDICINE | Admitting: EMERGENCY MEDICINE
Payer: MEDICARE

## 2022-01-01 ENCOUNTER — HOSPITAL ENCOUNTER (OUTPATIENT)
Dept: INFUSION CENTER | Facility: CLINIC | Age: 82
Discharge: HOME | End: 2022-02-28
Payer: MEDICARE

## 2022-01-01 ENCOUNTER — HOSPITAL ENCOUNTER (OUTPATIENT)
Dept: INFUSION CENTER | Facility: CLINIC | Age: 82
Discharge: HOME | End: 2022-03-24
Payer: MEDICARE

## 2022-01-01 ENCOUNTER — HOSPITAL ENCOUNTER (OUTPATIENT)
Dept: INFUSION CENTER | Facility: CLINIC | Age: 82
Discharge: HOME | End: 2022-04-11
Payer: MEDICARE

## 2022-01-01 ENCOUNTER — OFFICE VISIT (OUTPATIENT)
Dept: CARDIOLOGY CLINIC | Facility: CLINIC | Age: 82
End: 2022-01-01
Payer: MEDICARE

## 2022-01-01 ENCOUNTER — APPOINTMENT (EMERGENCY)
Dept: RADIOLOGY | Facility: HOSPITAL | Age: 82
End: 2022-01-01
Payer: MEDICARE

## 2022-01-01 ENCOUNTER — TELEPHONE (OUTPATIENT)
Dept: FAMILY MEDICINE CLINIC | Facility: CLINIC | Age: 82
End: 2022-01-01

## 2022-01-01 ENCOUNTER — HOSPITAL ENCOUNTER (OUTPATIENT)
Dept: INFUSION CENTER | Facility: CLINIC | Age: 82
Discharge: HOME | End: 2022-02-14
Payer: MEDICARE

## 2022-01-01 ENCOUNTER — TELEPHONE (OUTPATIENT)
Dept: NUTRITION | Facility: CLINIC | Age: 82
End: 2022-01-01

## 2022-01-01 ENCOUNTER — HOSPITAL ENCOUNTER (OUTPATIENT)
Dept: INFUSION CENTER | Facility: CLINIC | Age: 82
Discharge: HOME | End: 2022-02-25
Payer: MEDICARE

## 2022-01-01 ENCOUNTER — HOSPITAL ENCOUNTER (OUTPATIENT)
Dept: INFUSION CENTER | Facility: CLINIC | Age: 82
Discharge: HOME | End: 2022-03-10
Payer: MEDICARE

## 2022-01-01 ENCOUNTER — HOSPITAL ENCOUNTER (OUTPATIENT)
Dept: CT IMAGING | Facility: HOSPITAL | Age: 82
Discharge: HOME | End: 2022-04-01
Payer: MEDICARE

## 2022-01-01 ENCOUNTER — HOSPITAL ENCOUNTER (OUTPATIENT)
Dept: INFUSION CENTER | Facility: CLINIC | Age: 82
Discharge: HOME | End: 2022-04-07
Payer: MEDICARE

## 2022-01-01 ENCOUNTER — APPOINTMENT (INPATIENT)
Dept: NON INVASIVE DIAGNOSTICS | Facility: HOSPITAL | Age: 82
DRG: 309 | End: 2022-01-01
Payer: MEDICARE

## 2022-01-01 ENCOUNTER — EPISODE CHANGES (OUTPATIENT)
Dept: CASE MANAGEMENT | Facility: OTHER | Age: 82
End: 2022-01-01

## 2022-01-01 ENCOUNTER — HOSPITAL ENCOUNTER (OUTPATIENT)
Dept: INFUSION CENTER | Facility: CLINIC | Age: 82
Discharge: HOME | End: 2022-03-14
Payer: MEDICARE

## 2022-01-01 ENCOUNTER — DOCUMENTATION (OUTPATIENT)
Dept: HEMATOLOGY ONCOLOGY | Facility: CLINIC | Age: 82
End: 2022-01-01

## 2022-01-01 ENCOUNTER — TRANSITIONAL CARE MANAGEMENT (OUTPATIENT)
Dept: FAMILY MEDICINE CLINIC | Facility: CLINIC | Age: 82
End: 2022-01-01

## 2022-01-01 ENCOUNTER — APPOINTMENT (EMERGENCY)
Dept: RADIOLOGY | Facility: HOSPITAL | Age: 82
DRG: 309 | End: 2022-01-01
Payer: MEDICARE

## 2022-01-01 ENCOUNTER — PATIENT OUTREACH (OUTPATIENT)
Dept: CASE MANAGEMENT | Facility: HOSPITAL | Age: 82
End: 2022-01-01

## 2022-01-01 VITALS
WEIGHT: 94.14 LBS | HEART RATE: 84 BPM | TEMPERATURE: 97.4 F | SYSTOLIC BLOOD PRESSURE: 124 MMHG | BODY MASS INDEX: 15.67 KG/M2 | DIASTOLIC BLOOD PRESSURE: 66 MMHG | RESPIRATION RATE: 16 BRPM

## 2022-01-01 VITALS
SYSTOLIC BLOOD PRESSURE: 130 MMHG | DIASTOLIC BLOOD PRESSURE: 60 MMHG | RESPIRATION RATE: 20 BRPM | HEART RATE: 112 BPM | TEMPERATURE: 98.2 F

## 2022-01-01 VITALS
DIASTOLIC BLOOD PRESSURE: 52 MMHG | BODY MASS INDEX: 17.71 KG/M2 | RESPIRATION RATE: 18 BRPM | WEIGHT: 110.2 LBS | SYSTOLIC BLOOD PRESSURE: 109 MMHG | HEART RATE: 73 BPM | TEMPERATURE: 98.8 F | HEIGHT: 66 IN

## 2022-01-01 VITALS
HEART RATE: 122 BPM | SYSTOLIC BLOOD PRESSURE: 128 MMHG | DIASTOLIC BLOOD PRESSURE: 58 MMHG | TEMPERATURE: 99.1 F | WEIGHT: 111.4 LBS | BODY MASS INDEX: 18.56 KG/M2 | HEIGHT: 65 IN | RESPIRATION RATE: 17 BRPM

## 2022-01-01 VITALS
RESPIRATION RATE: 24 BRPM | SYSTOLIC BLOOD PRESSURE: 130 MMHG | DIASTOLIC BLOOD PRESSURE: 60 MMHG | WEIGHT: 107.4 LBS | OXYGEN SATURATION: 99 % | HEART RATE: 83 BPM | TEMPERATURE: 97.2 F | BODY MASS INDEX: 17.87 KG/M2

## 2022-01-01 VITALS
TEMPERATURE: 97.4 F | OXYGEN SATURATION: 99 % | BODY MASS INDEX: 18.08 KG/M2 | HEIGHT: 66 IN | HEART RATE: 65 BPM | RESPIRATION RATE: 16 BRPM | WEIGHT: 112.5 LBS

## 2022-01-01 VITALS
OXYGEN SATURATION: 97 % | WEIGHT: 104 LBS | SYSTOLIC BLOOD PRESSURE: 104 MMHG | DIASTOLIC BLOOD PRESSURE: 72 MMHG | BODY MASS INDEX: 17.33 KG/M2 | HEART RATE: 125 BPM | TEMPERATURE: 97.9 F | RESPIRATION RATE: 16 BRPM | HEIGHT: 65 IN

## 2022-01-01 VITALS — RESPIRATION RATE: 24 BRPM

## 2022-01-01 VITALS
HEART RATE: 69 BPM | TEMPERATURE: 97.7 F | SYSTOLIC BLOOD PRESSURE: 140 MMHG | RESPIRATION RATE: 18 BRPM | WEIGHT: 106.2 LBS | BODY MASS INDEX: 17.69 KG/M2 | DIASTOLIC BLOOD PRESSURE: 58 MMHG | HEIGHT: 65 IN

## 2022-01-01 VITALS
HEART RATE: 143 BPM | WEIGHT: 93.4 LBS | TEMPERATURE: 98.9 F | BODY MASS INDEX: 15.54 KG/M2 | DIASTOLIC BLOOD PRESSURE: 85 MMHG | SYSTOLIC BLOOD PRESSURE: 136 MMHG | RESPIRATION RATE: 19 BRPM | OXYGEN SATURATION: 96 %

## 2022-01-01 VITALS
RESPIRATION RATE: 16 BRPM | OXYGEN SATURATION: 98 % | WEIGHT: 109 LBS | DIASTOLIC BLOOD PRESSURE: 68 MMHG | HEART RATE: 72 BPM | HEIGHT: 65 IN | SYSTOLIC BLOOD PRESSURE: 112 MMHG | BODY MASS INDEX: 18.16 KG/M2

## 2022-01-01 VITALS
HEIGHT: 65 IN | BODY MASS INDEX: 17 KG/M2 | TEMPERATURE: 98.2 F | HEART RATE: 84 BPM | WEIGHT: 112 LBS | SYSTOLIC BLOOD PRESSURE: 164 MMHG | DIASTOLIC BLOOD PRESSURE: 67 MMHG | DIASTOLIC BLOOD PRESSURE: 59 MMHG | BODY MASS INDEX: 18.66 KG/M2 | SYSTOLIC BLOOD PRESSURE: 126 MMHG | HEIGHT: 66 IN | RESPIRATION RATE: 16 BRPM | WEIGHT: 105.8 LBS | TEMPERATURE: 97.7 F | HEART RATE: 81 BPM | RESPIRATION RATE: 16 BRPM

## 2022-01-01 VITALS
SYSTOLIC BLOOD PRESSURE: 117 MMHG | WEIGHT: 114.2 LBS | DIASTOLIC BLOOD PRESSURE: 55 MMHG | BODY MASS INDEX: 18.35 KG/M2 | TEMPERATURE: 99.1 F | RESPIRATION RATE: 18 BRPM | HEART RATE: 65 BPM | HEIGHT: 66 IN

## 2022-01-01 VITALS
RESPIRATION RATE: 16 BRPM | SYSTOLIC BLOOD PRESSURE: 112 MMHG | DIASTOLIC BLOOD PRESSURE: 58 MMHG | HEART RATE: 60 BPM | TEMPERATURE: 95.9 F

## 2022-01-01 VITALS
HEIGHT: 65 IN | BODY MASS INDEX: 17.83 KG/M2 | TEMPERATURE: 97.9 F | WEIGHT: 107 LBS | SYSTOLIC BLOOD PRESSURE: 114 MMHG | HEART RATE: 67 BPM | OXYGEN SATURATION: 96 % | RESPIRATION RATE: 16 BRPM | DIASTOLIC BLOOD PRESSURE: 62 MMHG

## 2022-01-01 VITALS
SYSTOLIC BLOOD PRESSURE: 118 MMHG | RESPIRATION RATE: 14 BRPM | HEIGHT: 65 IN | OXYGEN SATURATION: 98 % | HEART RATE: 58 BPM | DIASTOLIC BLOOD PRESSURE: 54 MMHG | TEMPERATURE: 97.5 F | BODY MASS INDEX: 18.73 KG/M2 | WEIGHT: 112.4 LBS

## 2022-01-01 VITALS — TEMPERATURE: 97.4 F | DIASTOLIC BLOOD PRESSURE: 50 MMHG | SYSTOLIC BLOOD PRESSURE: 120 MMHG

## 2022-01-01 VITALS
WEIGHT: 107.2 LBS | HEART RATE: 108 BPM | SYSTOLIC BLOOD PRESSURE: 106 MMHG | HEIGHT: 65 IN | BODY MASS INDEX: 17.86 KG/M2 | DIASTOLIC BLOOD PRESSURE: 64 MMHG | OXYGEN SATURATION: 97 %

## 2022-01-01 VITALS
TEMPERATURE: 97.8 F | OXYGEN SATURATION: 97 % | DIASTOLIC BLOOD PRESSURE: 64 MMHG | SYSTOLIC BLOOD PRESSURE: 124 MMHG | WEIGHT: 113 LBS | HEIGHT: 65 IN | RESPIRATION RATE: 16 BRPM | BODY MASS INDEX: 18.83 KG/M2 | HEART RATE: 61 BPM

## 2022-01-01 VITALS
TEMPERATURE: 98.4 F | DIASTOLIC BLOOD PRESSURE: 50 MMHG | BODY MASS INDEX: 16.22 KG/M2 | SYSTOLIC BLOOD PRESSURE: 122 MMHG | WEIGHT: 97.5 LBS

## 2022-01-01 VITALS
TEMPERATURE: 98.3 F | DIASTOLIC BLOOD PRESSURE: 60 MMHG | WEIGHT: 97 LBS | SYSTOLIC BLOOD PRESSURE: 116 MMHG | BODY MASS INDEX: 16.16 KG/M2 | HEIGHT: 65 IN

## 2022-01-01 VITALS
RESPIRATION RATE: 24 BRPM | DIASTOLIC BLOOD PRESSURE: 60 MMHG | SYSTOLIC BLOOD PRESSURE: 96 MMHG | HEART RATE: 100 BPM | HEART RATE: 100 BPM | RESPIRATION RATE: 16 BRPM | TEMPERATURE: 97.6 F

## 2022-01-01 VITALS
HEART RATE: 76 BPM | RESPIRATION RATE: 16 BRPM | SYSTOLIC BLOOD PRESSURE: 130 MMHG | TEMPERATURE: 98.1 F | DIASTOLIC BLOOD PRESSURE: 68 MMHG

## 2022-01-01 VITALS
RESPIRATION RATE: 16 BRPM | HEART RATE: 70 BPM | WEIGHT: 109 LBS | TEMPERATURE: 98.3 F | HEIGHT: 65 IN | SYSTOLIC BLOOD PRESSURE: 120 MMHG | DIASTOLIC BLOOD PRESSURE: 60 MMHG | OXYGEN SATURATION: 94 % | BODY MASS INDEX: 18.16 KG/M2

## 2022-01-01 VITALS
DIASTOLIC BLOOD PRESSURE: 80 MMHG | BODY MASS INDEX: 17.92 KG/M2 | OXYGEN SATURATION: 97 % | WEIGHT: 111 LBS | HEART RATE: 142 BPM | SYSTOLIC BLOOD PRESSURE: 128 MMHG | TEMPERATURE: 99.3 F | RESPIRATION RATE: 20 BRPM

## 2022-01-01 VITALS
RESPIRATION RATE: 18 BRPM | DIASTOLIC BLOOD PRESSURE: 60 MMHG | SYSTOLIC BLOOD PRESSURE: 120 MMHG | HEART RATE: 70 BPM | OXYGEN SATURATION: 99 % | WEIGHT: 112.2 LBS | BODY MASS INDEX: 18.67 KG/M2 | TEMPERATURE: 97.9 F

## 2022-01-01 VITALS
BODY MASS INDEX: 17.61 KG/M2 | HEIGHT: 66 IN | HEART RATE: 77 BPM | TEMPERATURE: 97.5 F | SYSTOLIC BLOOD PRESSURE: 151 MMHG | RESPIRATION RATE: 18 BRPM | WEIGHT: 109.6 LBS | DIASTOLIC BLOOD PRESSURE: 64 MMHG

## 2022-01-01 VITALS
WEIGHT: 93.7 LBS | DIASTOLIC BLOOD PRESSURE: 50 MMHG | TEMPERATURE: 98.1 F | RESPIRATION RATE: 20 BRPM | SYSTOLIC BLOOD PRESSURE: 90 MMHG | BODY MASS INDEX: 15.59 KG/M2 | HEART RATE: 96 BPM

## 2022-01-01 VITALS
SYSTOLIC BLOOD PRESSURE: 119 MMHG | WEIGHT: 110 LBS | RESPIRATION RATE: 18 BRPM | OXYGEN SATURATION: 97 % | HEIGHT: 66 IN | TEMPERATURE: 98.5 F | DIASTOLIC BLOOD PRESSURE: 67 MMHG | BODY MASS INDEX: 17.68 KG/M2 | HEART RATE: 64 BPM

## 2022-01-01 VITALS
HEIGHT: 65 IN | HEART RATE: 100 BPM | BODY MASS INDEX: 18.74 KG/M2 | SYSTOLIC BLOOD PRESSURE: 98 MMHG | DIASTOLIC BLOOD PRESSURE: 62 MMHG | TEMPERATURE: 97.6 F | RESPIRATION RATE: 16 BRPM | WEIGHT: 112.5 LBS | OXYGEN SATURATION: 98 %

## 2022-01-01 VITALS
SYSTOLIC BLOOD PRESSURE: 100 MMHG | BODY MASS INDEX: 17.6 KG/M2 | RESPIRATION RATE: 20 BRPM | WEIGHT: 109 LBS | TEMPERATURE: 97.7 F | HEART RATE: 88 BPM | DIASTOLIC BLOOD PRESSURE: 60 MMHG | OXYGEN SATURATION: 98 %

## 2022-01-01 VITALS
OXYGEN SATURATION: 98 % | HEART RATE: 70 BPM | HEIGHT: 66 IN | DIASTOLIC BLOOD PRESSURE: 70 MMHG | TEMPERATURE: 98.8 F | WEIGHT: 112 LBS | SYSTOLIC BLOOD PRESSURE: 108 MMHG | BODY MASS INDEX: 18 KG/M2

## 2022-01-01 VITALS — HEART RATE: 84 BPM | RESPIRATION RATE: 16 BRPM

## 2022-01-01 VITALS — HEART RATE: 64 BPM | SYSTOLIC BLOOD PRESSURE: 130 MMHG | DIASTOLIC BLOOD PRESSURE: 60 MMHG

## 2022-01-01 VITALS
HEART RATE: 68 BPM | OXYGEN SATURATION: 98 % | BODY MASS INDEX: 17.84 KG/M2 | SYSTOLIC BLOOD PRESSURE: 152 MMHG | HEIGHT: 66 IN | WEIGHT: 111 LBS | TEMPERATURE: 98.6 F | DIASTOLIC BLOOD PRESSURE: 50 MMHG

## 2022-01-01 VITALS — TEMPERATURE: 98.5 F

## 2022-01-01 VITALS — RESPIRATION RATE: 16 BRPM

## 2022-01-01 DIAGNOSIS — C25.9 PANCREATIC ADENOCARCINOMA (HCC): Primary | ICD-10-CM

## 2022-01-01 DIAGNOSIS — C25.1 MALIGNANT NEOPLASM OF BODY OF PANCREAS (HCC): ICD-10-CM

## 2022-01-01 DIAGNOSIS — K86.1 CHRONIC PANCREATITIS, UNSPECIFIED PANCREATITIS TYPE (HCC): ICD-10-CM

## 2022-01-01 DIAGNOSIS — C25.9 PANCREATIC ADENOCARCINOMA (HCC): ICD-10-CM

## 2022-01-01 DIAGNOSIS — Z92.21 AT HIGH RISK FOR INFECTION DUE TO CHEMOTHERAPY: ICD-10-CM

## 2022-01-01 DIAGNOSIS — F17.200 SMOKER: ICD-10-CM

## 2022-01-01 DIAGNOSIS — E44.1 MILD PROTEIN-CALORIE MALNUTRITION (HCC): ICD-10-CM

## 2022-01-01 DIAGNOSIS — Z51.11 ENCOUNTER FOR CHEMOTHERAPY MANAGEMENT: ICD-10-CM

## 2022-01-01 DIAGNOSIS — M51.36 DDD (DEGENERATIVE DISC DISEASE), LUMBAR: ICD-10-CM

## 2022-01-01 DIAGNOSIS — Z91.89 AT HIGH RISK FOR INFECTION DUE TO CHEMOTHERAPY: ICD-10-CM

## 2022-01-01 DIAGNOSIS — E78.2 MIXED HYPERLIPIDEMIA: ICD-10-CM

## 2022-01-01 DIAGNOSIS — F32.9 REACTIVE DEPRESSION: ICD-10-CM

## 2022-01-01 DIAGNOSIS — I48.91 ATRIAL FIBRILLATION WITH RVR (HCC): Primary | ICD-10-CM

## 2022-01-01 DIAGNOSIS — Z71.3 NUTRITIONAL COUNSELING: Primary | ICD-10-CM

## 2022-01-01 DIAGNOSIS — Z95.828 PORT-A-CATH IN PLACE: Primary | ICD-10-CM

## 2022-01-01 DIAGNOSIS — C25.9 PANCREATIC CANCER (HCC): ICD-10-CM

## 2022-01-01 DIAGNOSIS — Z51.5 PALLIATIVE CARE PATIENT: ICD-10-CM

## 2022-01-01 DIAGNOSIS — K59.03 DRUG-INDUCED CONSTIPATION: ICD-10-CM

## 2022-01-01 DIAGNOSIS — R11.0 NAUSEA: ICD-10-CM

## 2022-01-01 DIAGNOSIS — R74.8 ELEVATED ALKALINE PHOSPHATASE LEVEL: ICD-10-CM

## 2022-01-01 DIAGNOSIS — R60.9 EDEMA, UNSPECIFIED TYPE: ICD-10-CM

## 2022-01-01 DIAGNOSIS — Z95.828 PORT-A-CATH IN PLACE: ICD-10-CM

## 2022-01-01 DIAGNOSIS — I48.91 ATRIAL FIBRILLATION WITH RVR (HCC): ICD-10-CM

## 2022-01-01 DIAGNOSIS — Z51.5 HOSPICE CARE PATIENT: Primary | ICD-10-CM

## 2022-01-01 DIAGNOSIS — Z79.899 MEDICAL MARIJUANA USE: ICD-10-CM

## 2022-01-01 DIAGNOSIS — I10 ESSENTIAL HYPERTENSION: ICD-10-CM

## 2022-01-01 DIAGNOSIS — G89.3 CANCER RELATED PAIN: ICD-10-CM

## 2022-01-01 DIAGNOSIS — I48.19 PERSISTENT ATRIAL FIBRILLATION (HCC): Primary | ICD-10-CM

## 2022-01-01 DIAGNOSIS — I48.91 ATRIAL FIBRILLATION (HCC): ICD-10-CM

## 2022-01-01 DIAGNOSIS — K59.00 CONSTIPATION, UNSPECIFIED CONSTIPATION TYPE: ICD-10-CM

## 2022-01-01 DIAGNOSIS — F41.9 ANXIETY: ICD-10-CM

## 2022-01-01 DIAGNOSIS — S72.142A INTERTROCHANTERIC FRACTURE OF LEFT HIP (HCC): Primary | ICD-10-CM

## 2022-01-01 DIAGNOSIS — G89.3 CANCER ASSOCIATED PAIN: ICD-10-CM

## 2022-01-01 DIAGNOSIS — F33.9 DEPRESSION, RECURRENT (HCC): ICD-10-CM

## 2022-01-01 DIAGNOSIS — D72.829 LEUKOCYTOSIS, UNSPECIFIED TYPE: ICD-10-CM

## 2022-01-01 DIAGNOSIS — D69.6 PLATELETS DECREASED (HCC): ICD-10-CM

## 2022-01-01 LAB
ABO GROUP BLD: NORMAL
ABO GROUP BLD: NORMAL
ALBUMIN SERPL BCP-MCNC: 2.3 G/DL (ref 3.5–5)
ALBUMIN SERPL BCP-MCNC: 2.5 G/DL (ref 3.5–5)
ALBUMIN SERPL BCP-MCNC: 3.3 G/DL (ref 3.5–5)
ALBUMIN SERPL BCP-MCNC: 3.3 G/DL (ref 3.5–5)
ALBUMIN SERPL BCP-MCNC: 3.4 G/DL (ref 3.5–5)
ALBUMIN SERPL BCP-MCNC: 3.5 G/DL (ref 3.5–5)
ALBUMIN SERPL BCP-MCNC: 3.5 G/DL (ref 3.5–5)
ALBUMIN SERPL BCP-MCNC: 3.9 G/DL (ref 3.5–5)
ALP SERPL-CCNC: 128 U/L (ref 46–116)
ALP SERPL-CCNC: 135 U/L (ref 46–116)
ALP SERPL-CCNC: 160 U/L (ref 46–116)
ALP SERPL-CCNC: 170 U/L (ref 46–116)
ALP SERPL-CCNC: 184 U/L (ref 46–116)
ALP SERPL-CCNC: 228 U/L (ref 46–116)
ALP SERPL-CCNC: 262 U/L (ref 46–116)
ALP SERPL-CCNC: 329 U/L (ref 46–116)
ALP SERPL-CCNC: 93 U/L (ref 46–116)
ALP SERPL-CCNC: 96 U/L (ref 46–116)
ALT SERPL W P-5'-P-CCNC: 33 U/L (ref 12–78)
ALT SERPL W P-5'-P-CCNC: 33 U/L (ref 12–78)
ALT SERPL W P-5'-P-CCNC: 34 U/L (ref 12–78)
ALT SERPL W P-5'-P-CCNC: 37 U/L (ref 12–78)
ALT SERPL W P-5'-P-CCNC: 45 U/L (ref 12–78)
ALT SERPL W P-5'-P-CCNC: 46 U/L (ref 12–78)
ALT SERPL W P-5'-P-CCNC: 55 U/L (ref 12–78)
ALT SERPL W P-5'-P-CCNC: 61 U/L (ref 12–78)
ALT SERPL W P-5'-P-CCNC: 73 U/L (ref 12–78)
ALT SERPL W P-5'-P-CCNC: 84 U/L (ref 12–78)
ANION GAP SERPL CALCULATED.3IONS-SCNC: 10 MMOL/L (ref 4–13)
ANION GAP SERPL CALCULATED.3IONS-SCNC: 11 MMOL/L (ref 4–13)
ANION GAP SERPL CALCULATED.3IONS-SCNC: 12 MMOL/L (ref 4–13)
ANION GAP SERPL CALCULATED.3IONS-SCNC: 5 MMOL/L (ref 4–13)
ANION GAP SERPL CALCULATED.3IONS-SCNC: 6 MMOL/L (ref 4–13)
ANION GAP SERPL CALCULATED.3IONS-SCNC: 6 MMOL/L (ref 4–13)
ANION GAP SERPL CALCULATED.3IONS-SCNC: 7 MMOL/L (ref 4–13)
ANION GAP SERPL CALCULATED.3IONS-SCNC: 9 MMOL/L (ref 4–13)
ANISOCYTOSIS BLD QL SMEAR: PRESENT
AORTIC ROOT: 2.7 CM
AORTIC VALVE MEAN VELOCITY: 8.7 M/S
APICAL FOUR CHAMBER EJECTION FRACTION: 61 %
APTT PPP: 29 SECONDS (ref 23–37)
AST SERPL W P-5'-P-CCNC: 15 U/L (ref 5–45)
AST SERPL W P-5'-P-CCNC: 20 U/L (ref 5–45)
AST SERPL W P-5'-P-CCNC: 22 U/L (ref 5–45)
AST SERPL W P-5'-P-CCNC: 23 U/L (ref 5–45)
AST SERPL W P-5'-P-CCNC: 24 U/L (ref 5–45)
AST SERPL W P-5'-P-CCNC: 27 U/L (ref 5–45)
AST SERPL W P-5'-P-CCNC: 28 U/L (ref 5–45)
AST SERPL W P-5'-P-CCNC: 29 U/L (ref 5–45)
ATRIAL RATE: 129 BPM
ATRIAL RATE: 133 BPM
ATRIAL RATE: 166 BPM
ATRIAL RATE: 178 BPM
ATRIAL RATE: 357 BPM
AV LVOT MEAN GRADIENT: 2 MMHG
AV LVOT PEAK GRADIENT: 3 MMHG
AV MEAN GRADIENT: 3 MMHG
AV PEAK GRADIENT: 6 MMHG
BACTERIA UR CULT: NORMAL
BASOPHILS # BLD AUTO: 0.02 THOUSANDS/ΜL (ref 0–0.1)
BASOPHILS # BLD AUTO: 0.02 THOUSANDS/ΜL (ref 0–0.1)
BASOPHILS # BLD AUTO: 0.03 THOUSANDS/ΜL (ref 0–0.1)
BASOPHILS # BLD AUTO: 0.04 THOUSANDS/ΜL (ref 0–0.1)
BASOPHILS # BLD AUTO: 0.04 THOUSANDS/ΜL (ref 0–0.1)
BASOPHILS # BLD AUTO: 0.05 THOUSANDS/ΜL (ref 0–0.1)
BASOPHILS # BLD MANUAL: 0 THOUSAND/UL (ref 0–0.1)
BASOPHILS # BLD MANUAL: 0 THOUSAND/UL (ref 0–0.1)
BASOPHILS # BLD MANUAL: 0.04 THOUSAND/UL (ref 0–0.1)
BASOPHILS NFR BLD AUTO: 0 % (ref 0–1)
BASOPHILS NFR BLD AUTO: 1 % (ref 0–1)
BASOPHILS NFR BLD AUTO: 1 % (ref 0–1)
BASOPHILS NFR MAR MANUAL: 0 % (ref 0–1)
BASOPHILS NFR MAR MANUAL: 0 % (ref 0–1)
BASOPHILS NFR MAR MANUAL: 1 % (ref 0–1)
BILIRUB DIRECT SERPL-MCNC: 0.07 MG/DL (ref 0–0.2)
BILIRUB DIRECT SERPL-MCNC: 0.32 MG/DL (ref 0–0.2)
BILIRUB SERPL-MCNC: 0.19 MG/DL (ref 0.2–1)
BILIRUB SERPL-MCNC: 0.22 MG/DL (ref 0.2–1)
BILIRUB SERPL-MCNC: 0.28 MG/DL (ref 0.2–1)
BILIRUB SERPL-MCNC: 0.29 MG/DL (ref 0.2–1)
BILIRUB SERPL-MCNC: 0.3 MG/DL (ref 0.2–1)
BILIRUB SERPL-MCNC: 0.36 MG/DL (ref 0.2–1)
BILIRUB SERPL-MCNC: 0.38 MG/DL (ref 0.2–1)
BILIRUB SERPL-MCNC: 0.41 MG/DL (ref 0.2–1)
BILIRUB SERPL-MCNC: 1.59 MG/DL (ref 0.2–1)
BILIRUB SERPL-MCNC: 1.63 MG/DL (ref 0.2–1)
BILIRUB UR QL STRIP: NEGATIVE
BLD GP AB SCN SERPL QL: NEGATIVE
BUN SERPL-MCNC: 10 MG/DL (ref 5–25)
BUN SERPL-MCNC: 10 MG/DL (ref 5–25)
BUN SERPL-MCNC: 11 MG/DL (ref 5–25)
BUN SERPL-MCNC: 11 MG/DL (ref 5–25)
BUN SERPL-MCNC: 12 MG/DL (ref 5–25)
BUN SERPL-MCNC: 13 MG/DL (ref 5–25)
BUN SERPL-MCNC: 14 MG/DL (ref 5–25)
BUN SERPL-MCNC: 14 MG/DL (ref 5–25)
BUN SERPL-MCNC: 16 MG/DL (ref 5–25)
BUN SERPL-MCNC: 17 MG/DL (ref 5–25)
BUN SERPL-MCNC: 9 MG/DL (ref 5–25)
CALCIUM ALBUM COR SERPL-MCNC: 8.7 MG/DL (ref 8.3–10.1)
CALCIUM ALBUM COR SERPL-MCNC: 9.3 MG/DL (ref 8.3–10.1)
CALCIUM ALBUM COR SERPL-MCNC: 9.3 MG/DL (ref 8.3–10.1)
CALCIUM ALBUM COR SERPL-MCNC: 9.4 MG/DL (ref 8.3–10.1)
CALCIUM SERPL-MCNC: 6.3 MG/DL (ref 8.3–10.1)
CALCIUM SERPL-MCNC: 6.8 MG/DL (ref 8.3–10.1)
CALCIUM SERPL-MCNC: 7.5 MG/DL (ref 8.3–10.1)
CALCIUM SERPL-MCNC: 8.4 MG/DL (ref 8.3–10.1)
CALCIUM SERPL-MCNC: 8.5 MG/DL (ref 8.3–10.1)
CALCIUM SERPL-MCNC: 8.6 MG/DL (ref 8.3–10.1)
CALCIUM SERPL-MCNC: 8.7 MG/DL (ref 8.3–10.1)
CALCIUM SERPL-MCNC: 8.8 MG/DL (ref 8.3–10.1)
CALCIUM SERPL-MCNC: 8.8 MG/DL (ref 8.3–10.1)
CALCIUM SERPL-MCNC: 8.9 MG/DL (ref 8.3–10.1)
CALCIUM SERPL-MCNC: 9 MG/DL (ref 8.3–10.1)
CANCER AG19-9 SERPL-ACNC: 36 U/ML (ref 0–35)
CANCER AG19-9 SERPL-ACNC: 37 U/ML (ref 0–35)
CANCER AG19-9 SERPL-ACNC: 37 U/ML (ref 0–35)
CANCER AG19-9 SERPL-ACNC: 43 U/ML (ref 0–35)
CANCER AG19-9 SERPL-ACNC: 49 U/ML (ref 0–35)
CARDIAC TROPONIN I PNL SERPL HS: 9 NG/L
CARDIAC TROPONIN I PNL SERPL HS: 9 NG/L (ref 8–18)
CHLORIDE SERPL-SCNC: 104 MMOL/L (ref 100–108)
CHLORIDE SERPL-SCNC: 105 MMOL/L (ref 100–108)
CHLORIDE SERPL-SCNC: 107 MMOL/L (ref 100–108)
CHLORIDE SERPL-SCNC: 108 MMOL/L (ref 100–108)
CHLORIDE SERPL-SCNC: 110 MMOL/L (ref 100–108)
CHLORIDE SERPL-SCNC: 110 MMOL/L (ref 100–108)
CHLORIDE SERPL-SCNC: 111 MMOL/L (ref 100–108)
CHLORIDE SERPL-SCNC: 115 MMOL/L (ref 100–108)
CHLORIDE SERPL-SCNC: 119 MMOL/L (ref 100–108)
CHOLEST SERPL-MCNC: 105 MG/DL
CK SERPL-CCNC: 25 U/L (ref 26–192)
CLARITY UR: CLEAR
CO2 SERPL-SCNC: 20 MMOL/L (ref 21–32)
CO2 SERPL-SCNC: 21 MMOL/L (ref 21–32)
CO2 SERPL-SCNC: 22 MMOL/L (ref 21–32)
CO2 SERPL-SCNC: 23 MMOL/L (ref 21–32)
CO2 SERPL-SCNC: 24 MMOL/L (ref 21–32)
CO2 SERPL-SCNC: 25 MMOL/L (ref 21–32)
CO2 SERPL-SCNC: 25 MMOL/L (ref 21–32)
CO2 SERPL-SCNC: 26 MMOL/L (ref 21–32)
CO2 SERPL-SCNC: 27 MMOL/L (ref 21–32)
CO2 SERPL-SCNC: 28 MMOL/L (ref 21–32)
COLOR UR: YELLOW
CREAT SERPL-MCNC: 0.46 MG/DL (ref 0.6–1.3)
CREAT SERPL-MCNC: 0.57 MG/DL (ref 0.6–1.3)
CREAT SERPL-MCNC: 0.62 MG/DL (ref 0.6–1.3)
CREAT SERPL-MCNC: 0.65 MG/DL (ref 0.6–1.3)
CREAT SERPL-MCNC: 0.66 MG/DL (ref 0.6–1.3)
CREAT SERPL-MCNC: 0.67 MG/DL (ref 0.6–1.3)
CREAT SERPL-MCNC: 0.75 MG/DL (ref 0.6–1.3)
CREAT SERPL-MCNC: 0.76 MG/DL (ref 0.6–1.3)
CREAT SERPL-MCNC: 0.76 MG/DL (ref 0.6–1.3)
CREAT SERPL-MCNC: 0.78 MG/DL (ref 0.6–1.3)
CREAT SERPL-MCNC: 0.83 MG/DL (ref 0.6–1.3)
CREAT SERPL-MCNC: 0.84 MG/DL (ref 0.6–1.3)
CREAT SERPL-MCNC: 0.87 MG/DL (ref 0.6–1.3)
DOP CALC AO VTI: 21.1 CM
DOP CALC LVOT PEAK VEL VTI: 15.2 CM
DOP CALC LVOT PEAK VEL: 0.86 M/S
EOSINOPHIL # BLD AUTO: 0.07 THOUSAND/ΜL (ref 0–0.61)
EOSINOPHIL # BLD AUTO: 0.1 THOUSAND/ΜL (ref 0–0.61)
EOSINOPHIL # BLD AUTO: 0.11 THOUSAND/ΜL (ref 0–0.61)
EOSINOPHIL # BLD AUTO: 0.24 THOUSAND/ΜL (ref 0–0.61)
EOSINOPHIL # BLD AUTO: 0.26 THOUSAND/ΜL (ref 0–0.61)
EOSINOPHIL # BLD AUTO: 0.39 THOUSAND/ΜL (ref 0–0.61)
EOSINOPHIL # BLD MANUAL: 0 THOUSAND/UL (ref 0–0.4)
EOSINOPHIL # BLD MANUAL: 0.27 THOUSAND/UL (ref 0–0.4)
EOSINOPHIL # BLD MANUAL: 0.43 THOUSAND/UL (ref 0–0.4)
EOSINOPHIL NFR BLD AUTO: 1 % (ref 0–6)
EOSINOPHIL NFR BLD AUTO: 1 % (ref 0–6)
EOSINOPHIL NFR BLD AUTO: 2 % (ref 0–6)
EOSINOPHIL NFR BLD AUTO: 2 % (ref 0–6)
EOSINOPHIL NFR BLD AUTO: 4 % (ref 0–6)
EOSINOPHIL NFR BLD AUTO: 5 % (ref 0–6)
EOSINOPHIL NFR BLD MANUAL: 0 % (ref 0–6)
EOSINOPHIL NFR BLD MANUAL: 1 % (ref 0–6)
EOSINOPHIL NFR BLD MANUAL: 7 % (ref 0–6)
ERYTHROCYTE [DISTWIDTH] IN BLOOD BY AUTOMATED COUNT: 13.1 % (ref 11.6–15.1)
ERYTHROCYTE [DISTWIDTH] IN BLOOD BY AUTOMATED COUNT: 13.1 % (ref 11.6–15.1)
ERYTHROCYTE [DISTWIDTH] IN BLOOD BY AUTOMATED COUNT: 13.2 % (ref 11.6–15.1)
ERYTHROCYTE [DISTWIDTH] IN BLOOD BY AUTOMATED COUNT: 13.3 % (ref 11.6–15.1)
ERYTHROCYTE [DISTWIDTH] IN BLOOD BY AUTOMATED COUNT: 13.5 % (ref 11.6–15.1)
ERYTHROCYTE [DISTWIDTH] IN BLOOD BY AUTOMATED COUNT: 13.8 % (ref 11.6–15.1)
ERYTHROCYTE [DISTWIDTH] IN BLOOD BY AUTOMATED COUNT: 13.8 % (ref 11.6–15.1)
ERYTHROCYTE [DISTWIDTH] IN BLOOD BY AUTOMATED COUNT: 15.1 % (ref 11.6–15.1)
ERYTHROCYTE [DISTWIDTH] IN BLOOD BY AUTOMATED COUNT: 15.4 % (ref 11.6–15.1)
ERYTHROCYTE [DISTWIDTH] IN BLOOD BY AUTOMATED COUNT: 15.7 % (ref 11.6–15.1)
ERYTHROCYTE [DISTWIDTH] IN BLOOD BY AUTOMATED COUNT: 15.7 % (ref 11.6–15.1)
ERYTHROCYTE [DISTWIDTH] IN BLOOD BY AUTOMATED COUNT: 16.2 % (ref 11.6–15.1)
FLUAV RNA RESP QL NAA+PROBE: NEGATIVE
FLUBV RNA RESP QL NAA+PROBE: NEGATIVE
FRACTIONAL SHORTENING: 33 % (ref 28–44)
GFR SERPL CREATININE-BSD FRML MDRD: 63 ML/MIN/1.73SQ M
GFR SERPL CREATININE-BSD FRML MDRD: 66 ML/MIN/1.73SQ M
GFR SERPL CREATININE-BSD FRML MDRD: 67 ML/MIN/1.73SQ M
GFR SERPL CREATININE-BSD FRML MDRD: 72 ML/MIN/1.73SQ M
GFR SERPL CREATININE-BSD FRML MDRD: 74 ML/MIN/1.73SQ M
GFR SERPL CREATININE-BSD FRML MDRD: 74 ML/MIN/1.73SQ M
GFR SERPL CREATININE-BSD FRML MDRD: 76 ML/MIN/1.73SQ M
GFR SERPL CREATININE-BSD FRML MDRD: 83 ML/MIN/1.73SQ M
GFR SERPL CREATININE-BSD FRML MDRD: 84 ML/MIN/1.73SQ M
GFR SERPL CREATININE-BSD FRML MDRD: 84 ML/MIN/1.73SQ M
GFR SERPL CREATININE-BSD FRML MDRD: 86 ML/MIN/1.73SQ M
GFR SERPL CREATININE-BSD FRML MDRD: 88 ML/MIN/1.73SQ M
GFR SERPL CREATININE-BSD FRML MDRD: 94 ML/MIN/1.73SQ M
GLUCOSE SERPL-MCNC: 103 MG/DL (ref 65–140)
GLUCOSE SERPL-MCNC: 106 MG/DL (ref 65–140)
GLUCOSE SERPL-MCNC: 107 MG/DL (ref 65–140)
GLUCOSE SERPL-MCNC: 109 MG/DL (ref 65–140)
GLUCOSE SERPL-MCNC: 109 MG/DL (ref 65–140)
GLUCOSE SERPL-MCNC: 119 MG/DL (ref 65–140)
GLUCOSE SERPL-MCNC: 121 MG/DL (ref 65–140)
GLUCOSE SERPL-MCNC: 127 MG/DL (ref 65–140)
GLUCOSE SERPL-MCNC: 156 MG/DL (ref 65–140)
GLUCOSE SERPL-MCNC: 180 MG/DL (ref 65–140)
GLUCOSE SERPL-MCNC: 82 MG/DL (ref 65–140)
GLUCOSE SERPL-MCNC: 97 MG/DL (ref 65–140)
GLUCOSE SERPL-MCNC: 98 MG/DL (ref 65–140)
GLUCOSE SERPL-MCNC: 99 MG/DL (ref 65–140)
GLUCOSE UR STRIP-MCNC: NEGATIVE MG/DL
HCT VFR BLD AUTO: 19.8 % (ref 34.8–46.1)
HCT VFR BLD AUTO: 22.3 % (ref 34.8–46.1)
HCT VFR BLD AUTO: 23.3 % (ref 34.8–46.1)
HCT VFR BLD AUTO: 27.2 % (ref 34.8–46.1)
HCT VFR BLD AUTO: 28.2 % (ref 34.8–46.1)
HCT VFR BLD AUTO: 30 % (ref 34.8–46.1)
HCT VFR BLD AUTO: 30.1 % (ref 34.8–46.1)
HCT VFR BLD AUTO: 30.3 % (ref 34.8–46.1)
HCT VFR BLD AUTO: 30.4 % (ref 34.8–46.1)
HCT VFR BLD AUTO: 31.4 % (ref 34.8–46.1)
HCT VFR BLD AUTO: 32.1 % (ref 34.8–46.1)
HCT VFR BLD AUTO: 32.2 % (ref 34.8–46.1)
HCT VFR BLD AUTO: 32.7 % (ref 34.8–46.1)
HCT VFR BLD AUTO: 33.1 % (ref 34.8–46.1)
HCT VFR BLD AUTO: 39.8 % (ref 34.8–46.1)
HDLC SERPL-MCNC: 48 MG/DL
HGB BLD-MCNC: 10 G/DL (ref 11.5–15.4)
HGB BLD-MCNC: 10.1 G/DL (ref 11.5–15.4)
HGB BLD-MCNC: 10.1 G/DL (ref 11.5–15.4)
HGB BLD-MCNC: 10.2 G/DL (ref 11.5–15.4)
HGB BLD-MCNC: 10.2 G/DL (ref 11.5–15.4)
HGB BLD-MCNC: 10.5 G/DL (ref 11.5–15.4)
HGB BLD-MCNC: 10.7 G/DL (ref 11.5–15.4)
HGB BLD-MCNC: 12.8 G/DL (ref 11.5–15.4)
HGB BLD-MCNC: 6.7 G/DL (ref 11.5–15.4)
HGB BLD-MCNC: 7.3 G/DL (ref 11.5–15.4)
HGB BLD-MCNC: 7.5 G/DL (ref 11.5–15.4)
HGB BLD-MCNC: 8.6 G/DL (ref 11.5–15.4)
HGB BLD-MCNC: 9.2 G/DL (ref 11.5–15.4)
HGB BLD-MCNC: 9.5 G/DL (ref 11.5–15.4)
HGB BLD-MCNC: 9.8 G/DL (ref 11.5–15.4)
HGB UR QL STRIP.AUTO: NEGATIVE
IMM GRANULOCYTES # BLD AUTO: 0.02 THOUSAND/UL (ref 0–0.2)
IMM GRANULOCYTES # BLD AUTO: 0.03 THOUSAND/UL (ref 0–0.2)
IMM GRANULOCYTES # BLD AUTO: 0.04 THOUSAND/UL (ref 0–0.2)
IMM GRANULOCYTES # BLD AUTO: 0.04 THOUSAND/UL (ref 0–0.2)
IMM GRANULOCYTES NFR BLD AUTO: 0 % (ref 0–2)
INR PPP: 1.09 (ref 0.84–1.19)
INTERVENTRICULAR SEPTUM IN DIASTOLE (PARASTERNAL SHORT AXIS VIEW): 1.1 CM
KETONES UR STRIP-MCNC: NEGATIVE MG/DL
LA/AORTA RATIO 2D: 1.15
LAAS-AP4: 12.4 CM2
LDLC SERPL CALC-MCNC: 44 MG/DL (ref 0–100)
LEFT ATRIUM SIZE: 3.1 CM
LEFT INTERNAL DIMENSION IN SYSTOLE: 2.4 CM (ref 2.1–4)
LEFT VENTRICULAR INTERNAL DIMENSION IN DIASTOLE: 3.6 CM (ref 3.64–5.42)
LEFT VENTRICULAR POSTERIOR WALL IN END DIASTOLE: 1 CM
LEFT VENTRICULAR STROKE VOLUME: 34 ML
LEUKOCYTE ESTERASE UR QL STRIP: NEGATIVE
LYMPHOCYTES # BLD AUTO: 1.08 THOUSAND/UL (ref 0.6–4.47)
LYMPHOCYTES # BLD AUTO: 1.27 THOUSANDS/ΜL (ref 0.6–4.47)
LYMPHOCYTES # BLD AUTO: 1.27 THOUSANDS/ΜL (ref 0.6–4.47)
LYMPHOCYTES # BLD AUTO: 1.35 THOUSANDS/ΜL (ref 0.6–4.47)
LYMPHOCYTES # BLD AUTO: 1.49 THOUSANDS/ΜL (ref 0.6–4.47)
LYMPHOCYTES # BLD AUTO: 1.67 THOUSANDS/ΜL (ref 0.6–4.47)
LYMPHOCYTES # BLD AUTO: 1.7 THOUSAND/UL (ref 0.6–4.47)
LYMPHOCYTES # BLD AUTO: 12 % (ref 14–44)
LYMPHOCYTES # BLD AUTO: 2.08 THOUSANDS/ΜL (ref 0.6–4.47)
LYMPHOCYTES # BLD AUTO: 28 % (ref 14–44)
LYMPHOCYTES # BLD AUTO: 4 % (ref 14–44)
LYMPHOCYTES # BLD AUTO: 4.53 THOUSAND/UL (ref 0.6–4.47)
LYMPHOCYTES NFR BLD AUTO: 11 % (ref 14–44)
LYMPHOCYTES NFR BLD AUTO: 12 % (ref 14–44)
LYMPHOCYTES NFR BLD AUTO: 18 % (ref 14–44)
LYMPHOCYTES NFR BLD AUTO: 22 % (ref 14–44)
LYMPHOCYTES NFR BLD AUTO: 24 % (ref 14–44)
LYMPHOCYTES NFR BLD AUTO: 25 % (ref 14–44)
MAGNESIUM SERPL-MCNC: 1.9 MG/DL (ref 1.6–2.6)
MCH RBC QN AUTO: 31.8 PG (ref 26.8–34.3)
MCH RBC QN AUTO: 32.5 PG (ref 26.8–34.3)
MCH RBC QN AUTO: 32.6 PG (ref 26.8–34.3)
MCH RBC QN AUTO: 32.6 PG (ref 26.8–34.3)
MCH RBC QN AUTO: 32.8 PG (ref 26.8–34.3)
MCH RBC QN AUTO: 32.9 PG (ref 26.8–34.3)
MCH RBC QN AUTO: 33 PG (ref 26.8–34.3)
MCH RBC QN AUTO: 33.2 PG (ref 26.8–34.3)
MCH RBC QN AUTO: 33.6 PG (ref 26.8–34.3)
MCH RBC QN AUTO: 33.7 PG (ref 26.8–34.3)
MCH RBC QN AUTO: 33.9 PG (ref 26.8–34.3)
MCH RBC QN AUTO: 34.4 PG (ref 26.8–34.3)
MCH RBC QN AUTO: 34.5 PG (ref 26.8–34.3)
MCHC RBC AUTO-ENTMCNC: 31.3 G/DL (ref 31.4–37.4)
MCHC RBC AUTO-ENTMCNC: 31.6 G/DL (ref 31.4–37.4)
MCHC RBC AUTO-ENTMCNC: 31.7 G/DL (ref 31.4–37.4)
MCHC RBC AUTO-ENTMCNC: 31.8 G/DL (ref 31.4–37.4)
MCHC RBC AUTO-ENTMCNC: 31.8 G/DL (ref 31.4–37.4)
MCHC RBC AUTO-ENTMCNC: 32.1 G/DL (ref 31.4–37.4)
MCHC RBC AUTO-ENTMCNC: 32.2 G/DL (ref 31.4–37.4)
MCHC RBC AUTO-ENTMCNC: 32.2 G/DL (ref 31.4–37.4)
MCHC RBC AUTO-ENTMCNC: 32.3 G/DL (ref 31.4–37.4)
MCHC RBC AUTO-ENTMCNC: 32.6 G/DL (ref 31.4–37.4)
MCHC RBC AUTO-ENTMCNC: 32.6 G/DL (ref 31.4–37.4)
MCHC RBC AUTO-ENTMCNC: 32.7 G/DL (ref 31.4–37.4)
MCHC RBC AUTO-ENTMCNC: 33.3 G/DL (ref 31.4–37.4)
MCHC RBC AUTO-ENTMCNC: 33.7 G/DL (ref 31.4–37.4)
MCHC RBC AUTO-ENTMCNC: 33.8 G/DL (ref 31.4–37.4)
MCV RBC AUTO: 100 FL (ref 82–98)
MCV RBC AUTO: 101 FL (ref 82–98)
MCV RBC AUTO: 102 FL (ref 82–98)
MCV RBC AUTO: 103 FL (ref 82–98)
MCV RBC AUTO: 105 FL (ref 82–98)
MCV RBC AUTO: 106 FL (ref 82–98)
MCV RBC AUTO: 107 FL (ref 82–98)
MONOCYTES # BLD AUTO: 0.08 THOUSAND/UL (ref 0–1.22)
MONOCYTES # BLD AUTO: 0.23 THOUSAND/ΜL (ref 0.17–1.22)
MONOCYTES # BLD AUTO: 0.53 THOUSAND/ΜL (ref 0.17–1.22)
MONOCYTES # BLD AUTO: 0.65 THOUSAND/ΜL (ref 0.17–1.22)
MONOCYTES # BLD AUTO: 0.77 THOUSAND/ΜL (ref 0.17–1.22)
MONOCYTES # BLD AUTO: 0.83 THOUSAND/ΜL (ref 0.17–1.22)
MONOCYTES # BLD AUTO: 0.85 THOUSAND/UL (ref 0–1.22)
MONOCYTES # BLD AUTO: 1.13 THOUSAND/UL (ref 0–1.22)
MONOCYTES # BLD AUTO: 1.17 THOUSAND/ΜL (ref 0.17–1.22)
MONOCYTES NFR BLD AUTO: 10 % (ref 4–12)
MONOCYTES NFR BLD AUTO: 2 % (ref 4–12)
MONOCYTES NFR BLD AUTO: 9 % (ref 4–12)
MONOCYTES NFR BLD: 2 % (ref 4–12)
MONOCYTES NFR BLD: 2 % (ref 4–12)
MONOCYTES NFR BLD: 3 % (ref 4–12)
NEUTROPHILS # BLD AUTO: 3.95 THOUSANDS/ΜL (ref 1.85–7.62)
NEUTROPHILS # BLD AUTO: 4.31 THOUSANDS/ΜL (ref 1.85–7.62)
NEUTROPHILS # BLD AUTO: 5.12 THOUSANDS/ΜL (ref 1.85–7.62)
NEUTROPHILS # BLD AUTO: 5.81 THOUSANDS/ΜL (ref 1.85–7.62)
NEUTROPHILS # BLD AUTO: 9.05 THOUSANDS/ΜL (ref 1.85–7.62)
NEUTROPHILS # BLD AUTO: 9.86 THOUSANDS/ΜL (ref 1.85–7.62)
NEUTROPHILS # BLD MANUAL: 2.28 THOUSAND/UL (ref 1.85–7.62)
NEUTROPHILS # BLD MANUAL: 32.07 THOUSAND/UL (ref 1.85–7.62)
NEUTROPHILS # BLD MANUAL: 39.15 THOUSAND/UL (ref 1.85–7.62)
NEUTS BAND NFR BLD MANUAL: 3 % (ref 0–8)
NEUTS BAND NFR BLD MANUAL: 4 % (ref 0–8)
NEUTS SEG NFR BLD AUTO: 59 % (ref 43–75)
NEUTS SEG NFR BLD AUTO: 60 % (ref 43–75)
NEUTS SEG NFR BLD AUTO: 63 % (ref 43–75)
NEUTS SEG NFR BLD AUTO: 66 % (ref 43–75)
NEUTS SEG NFR BLD AUTO: 72 % (ref 43–75)
NEUTS SEG NFR BLD AUTO: 79 % (ref 43–75)
NEUTS SEG NFR BLD AUTO: 82 % (ref 43–75)
NEUTS SEG NFR BLD AUTO: 83 % (ref 43–75)
NEUTS SEG NFR BLD AUTO: 88 % (ref 43–75)
NITRITE UR QL STRIP: NEGATIVE
NRBC BLD AUTO-RTO: 0 /100 WBCS
NT-PROBNP SERPL-MCNC: 1240 PG/ML
PH UR STRIP.AUTO: 7.5 [PH]
PLATELET # BLD AUTO: 142 THOUSANDS/UL (ref 149–390)
PLATELET # BLD AUTO: 147 THOUSANDS/UL (ref 149–390)
PLATELET # BLD AUTO: 165 THOUSANDS/UL (ref 149–390)
PLATELET # BLD AUTO: 168 THOUSANDS/UL (ref 149–390)
PLATELET # BLD AUTO: 172 THOUSANDS/UL (ref 149–390)
PLATELET # BLD AUTO: 176 THOUSANDS/UL (ref 149–390)
PLATELET # BLD AUTO: 178 THOUSANDS/UL (ref 149–390)
PLATELET # BLD AUTO: 184 THOUSANDS/UL (ref 149–390)
PLATELET # BLD AUTO: 193 THOUSANDS/UL (ref 149–390)
PLATELET # BLD AUTO: 196 THOUSANDS/UL (ref 149–390)
PLATELET # BLD AUTO: 201 THOUSANDS/UL (ref 149–390)
PLATELET # BLD AUTO: 208 THOUSANDS/UL (ref 149–390)
PLATELET # BLD AUTO: 214 THOUSANDS/UL (ref 149–390)
PLATELET # BLD AUTO: 233 THOUSANDS/UL (ref 149–390)
PLATELET # BLD AUTO: 274 THOUSANDS/UL (ref 149–390)
PLATELET BLD QL SMEAR: ADEQUATE
PMV BLD AUTO: 10 FL (ref 8.9–12.7)
PMV BLD AUTO: 10.1 FL (ref 8.9–12.7)
PMV BLD AUTO: 10.2 FL (ref 8.9–12.7)
PMV BLD AUTO: 9.3 FL (ref 8.9–12.7)
PMV BLD AUTO: 9.3 FL (ref 8.9–12.7)
PMV BLD AUTO: 9.5 FL (ref 8.9–12.7)
PMV BLD AUTO: 9.5 FL (ref 8.9–12.7)
PMV BLD AUTO: 9.7 FL (ref 8.9–12.7)
PMV BLD AUTO: 9.7 FL (ref 8.9–12.7)
PMV BLD AUTO: 9.8 FL (ref 8.9–12.7)
PMV BLD AUTO: 9.8 FL (ref 8.9–12.7)
PMV BLD AUTO: 9.9 FL (ref 8.9–12.7)
POTASSIUM SERPL-SCNC: 3.1 MMOL/L (ref 3.5–5.3)
POTASSIUM SERPL-SCNC: 3.3 MMOL/L (ref 3.5–5.3)
POTASSIUM SERPL-SCNC: 3.3 MMOL/L (ref 3.5–5.3)
POTASSIUM SERPL-SCNC: 3.5 MMOL/L (ref 3.5–5.3)
POTASSIUM SERPL-SCNC: 3.9 MMOL/L (ref 3.5–5.3)
POTASSIUM SERPL-SCNC: 3.9 MMOL/L (ref 3.5–5.3)
POTASSIUM SERPL-SCNC: 4 MMOL/L (ref 3.5–5.3)
POTASSIUM SERPL-SCNC: 4 MMOL/L (ref 3.5–5.3)
POTASSIUM SERPL-SCNC: 4.1 MMOL/L (ref 3.5–5.3)
POTASSIUM SERPL-SCNC: 4.1 MMOL/L (ref 3.5–5.3)
POTASSIUM SERPL-SCNC: 4.2 MMOL/L (ref 3.5–5.3)
POTASSIUM SERPL-SCNC: 4.2 MMOL/L (ref 3.5–5.3)
POTASSIUM SERPL-SCNC: 4.4 MMOL/L (ref 3.5–5.3)
PROT SERPL-MCNC: 4.3 G/DL (ref 6.4–8.2)
PROT SERPL-MCNC: 4.9 G/DL (ref 6.4–8.2)
PROT SERPL-MCNC: 6.2 G/DL (ref 6.4–8.2)
PROT SERPL-MCNC: 6.3 G/DL (ref 6.4–8.2)
PROT SERPL-MCNC: 6.3 G/DL (ref 6.4–8.2)
PROT SERPL-MCNC: 6.4 G/DL (ref 6.4–8.2)
PROT SERPL-MCNC: 6.4 G/DL (ref 6.4–8.2)
PROT SERPL-MCNC: 6.5 G/DL (ref 6.4–8.2)
PROT SERPL-MCNC: 6.6 G/DL (ref 6.4–8.2)
PROT SERPL-MCNC: 7.1 G/DL (ref 6.4–8.2)
PROT UR STRIP-MCNC: NEGATIVE MG/DL
PROTHROMBIN TIME: 13.6 SECONDS (ref 11.6–14.5)
QRS AXIS: 50 DEGREES
QRS AXIS: 54 DEGREES
QRS AXIS: 78 DEGREES
QRS AXIS: 79 DEGREES
QRS AXIS: 89 DEGREES
QRSD INTERVAL: 70 MS
QRSD INTERVAL: 70 MS
QRSD INTERVAL: 74 MS
QRSD INTERVAL: 78 MS
QRSD INTERVAL: 88 MS
QT INTERVAL: 222 MS
QT INTERVAL: 262 MS
QT INTERVAL: 276 MS
QT INTERVAL: 276 MS
QT INTERVAL: 306 MS
QTC INTERVAL: 348 MS
QTC INTERVAL: 366 MS
QTC INTERVAL: 396 MS
QTC INTERVAL: 443 MS
QTC INTERVAL: 448 MS
RBC # BLD AUTO: 1.94 MILLION/UL (ref 3.81–5.12)
RBC # BLD AUTO: 2.2 MILLION/UL (ref 3.81–5.12)
RBC # BLD AUTO: 2.3 MILLION/UL (ref 3.81–5.12)
RBC # BLD AUTO: 2.64 MILLION/UL (ref 3.81–5.12)
RBC # BLD AUTO: 2.79 MILLION/UL (ref 3.81–5.12)
RBC # BLD AUTO: 2.88 MILLION/UL (ref 3.81–5.12)
RBC # BLD AUTO: 2.94 MILLION/UL (ref 3.81–5.12)
RBC # BLD AUTO: 2.97 MILLION/UL (ref 3.81–5.12)
RBC # BLD AUTO: 2.98 MILLION/UL (ref 3.81–5.12)
RBC # BLD AUTO: 3 MILLION/UL (ref 3.81–5.12)
RBC # BLD AUTO: 3.01 MILLION/UL (ref 3.81–5.12)
RBC # BLD AUTO: 3.21 MILLION/UL (ref 3.81–5.12)
RBC # BLD AUTO: 3.23 MILLION/UL (ref 3.81–5.12)
RBC # BLD AUTO: 3.25 MILLION/UL (ref 3.81–5.12)
RBC # BLD AUTO: 3.9 MILLION/UL (ref 3.81–5.12)
RH BLD: POSITIVE
RH BLD: POSITIVE
RSV RNA RESP QL NAA+PROBE: NEGATIVE
SARS-COV-2 RNA RESP QL NAA+PROBE: NEGATIVE
SL CV PED ECHO LEFT VENTRICLE DIASTOLIC VOLUME (MOD BIPLANE) 2D: 53 ML
SL CV PED ECHO LEFT VENTRICLE SYSTOLIC VOLUME (MOD BIPLANE) 2D: 19 ML
SODIUM SERPL-SCNC: 137 MMOL/L (ref 136–145)
SODIUM SERPL-SCNC: 139 MMOL/L (ref 136–145)
SODIUM SERPL-SCNC: 140 MMOL/L (ref 136–145)
SODIUM SERPL-SCNC: 141 MMOL/L (ref 136–145)
SODIUM SERPL-SCNC: 141 MMOL/L (ref 136–145)
SODIUM SERPL-SCNC: 142 MMOL/L (ref 136–145)
SODIUM SERPL-SCNC: 143 MMOL/L (ref 136–145)
SODIUM SERPL-SCNC: 146 MMOL/L (ref 136–145)
SP GR UR STRIP.AUTO: 1.01 (ref 1–1.03)
SPECIMEN EXPIRATION DATE: NORMAL
T WAVE AXIS: -77 DEGREES
T WAVE AXIS: 251 DEGREES
T WAVE AXIS: 260 DEGREES
T WAVE AXIS: 261 DEGREES
T WAVE AXIS: 262 DEGREES
TR MAX PG: 19 MMHG
TRICUSPID ANNULAR PLANE SYSTOLIC EXCURSION: 1.6 CM
TRICUSPID VALVE PEAK REGURGITATION VELOCITY: 2.17 M/S
TRIGL SERPL-MCNC: 65 MG/DL
TSH SERPL DL<=0.05 MIU/L-ACNC: 0.59 UIU/ML (ref 0.36–3.74)
UROBILINOGEN UR QL STRIP.AUTO: 0.2 E.U./DL
VARIANT LYMPHS # BLD AUTO: 1 %
VARIANT LYMPHS # BLD AUTO: 3 %
VENTRICULAR RATE: 106 BPM
VENTRICULAR RATE: 126 BPM
VENTRICULAR RATE: 138 BPM
VENTRICULAR RATE: 148 BPM
VENTRICULAR RATE: 159 BPM
WBC # BLD AUTO: 10.9 THOUSAND/UL (ref 4.31–10.16)
WBC # BLD AUTO: 12.53 THOUSAND/UL (ref 4.31–10.16)
WBC # BLD AUTO: 3.18 THOUSAND/UL (ref 4.31–10.16)
WBC # BLD AUTO: 3.24 THOUSAND/UL (ref 4.31–10.16)
WBC # BLD AUTO: 3.4 THOUSAND/UL (ref 4.31–10.16)
WBC # BLD AUTO: 3.87 THOUSAND/UL (ref 4.31–10.16)
WBC # BLD AUTO: 37.73 THOUSAND/UL (ref 4.31–10.16)
WBC # BLD AUTO: 42.55 THOUSAND/UL (ref 4.31–10.16)
WBC # BLD AUTO: 5.22 THOUSAND/UL (ref 4.31–10.16)
WBC # BLD AUTO: 5.91 THOUSAND/UL (ref 4.31–10.16)
WBC # BLD AUTO: 5.93 THOUSAND/UL (ref 4.31–10.16)
WBC # BLD AUTO: 6.36 THOUSAND/UL (ref 4.31–10.16)
WBC # BLD AUTO: 6.91 THOUSAND/UL (ref 4.31–10.16)
WBC # BLD AUTO: 8.22 THOUSAND/UL (ref 4.31–10.16)
WBC # BLD AUTO: 8.48 THOUSAND/UL (ref 4.31–10.16)
Z-SCORE OF LEFT VENTRICULAR DIMENSION IN END SYSTOLE: -2.1

## 2022-01-01 PROCEDURE — 93005 ELECTROCARDIOGRAM TRACING: CPT

## 2022-01-01 PROCEDURE — 10330087 HSPC SERVICE INTENSITY ADD-ON

## 2022-01-01 PROCEDURE — 99215 OFFICE O/P EST HI 40 MIN: CPT | Performed by: STUDENT IN AN ORGANIZED HEALTH CARE EDUCATION/TRAINING PROGRAM

## 2022-01-01 PROCEDURE — 85027 COMPLETE CBC AUTOMATED: CPT | Performed by: PHYSICIAN ASSISTANT

## 2022-01-01 PROCEDURE — T2042 HOSPICE ROUTINE HOME CARE: HCPCS

## 2022-01-01 PROCEDURE — 10330057 MEDICATION, GENERAL

## 2022-01-01 PROCEDURE — 80053 COMPREHEN METABOLIC PANEL: CPT | Performed by: INTERNAL MEDICINE

## 2022-01-01 PROCEDURE — 86301 IMMUNOASSAY TUMOR CA 19-9: CPT | Performed by: INTERNAL MEDICINE

## 2022-01-01 PROCEDURE — 80048 BASIC METABOLIC PNL TOTAL CA: CPT | Performed by: INTERNAL MEDICINE

## 2022-01-01 PROCEDURE — 99214 OFFICE O/P EST MOD 30 MIN: CPT | Performed by: FAMILY MEDICINE

## 2022-01-01 PROCEDURE — 99496 TRANSJ CARE MGMT HIGH F2F 7D: CPT | Performed by: FAMILY MEDICINE

## 2022-01-01 PROCEDURE — NC001 PR NO CHARGE: Performed by: EMERGENCY MEDICINE

## 2022-01-01 PROCEDURE — G0299 HHS/HOSPICE OF RN EA 15 MIN: HCPCS

## 2022-01-01 PROCEDURE — 80053 COMPREHEN METABOLIC PANEL: CPT

## 2022-01-01 PROCEDURE — 93306 TTE W/DOPPLER COMPLETE: CPT

## 2022-01-01 PROCEDURE — 96413 CHEMO IV INFUSION 1 HR: CPT

## 2022-01-01 PROCEDURE — 80048 BASIC METABOLIC PNL TOTAL CA: CPT | Performed by: EMERGENCY MEDICINE

## 2022-01-01 PROCEDURE — 93010 ELECTROCARDIOGRAM REPORT: CPT | Performed by: INTERNAL MEDICINE

## 2022-01-01 PROCEDURE — 99291 CRITICAL CARE FIRST HOUR: CPT | Performed by: EMERGENCY MEDICINE

## 2022-01-01 PROCEDURE — 83735 ASSAY OF MAGNESIUM: CPT | Performed by: INTERNAL MEDICINE

## 2022-01-01 PROCEDURE — 84484 ASSAY OF TROPONIN QUANT: CPT | Performed by: INTERNAL MEDICINE

## 2022-01-01 PROCEDURE — 0241U HB NFCT DS VIR RESP RNA 4 TRGT: CPT | Performed by: EMERGENCY MEDICINE

## 2022-01-01 PROCEDURE — 85025 COMPLETE CBC W/AUTO DIFF WBC: CPT | Performed by: INTERNAL MEDICINE

## 2022-01-01 PROCEDURE — 99214 OFFICE O/P EST MOD 30 MIN: CPT | Performed by: INTERNAL MEDICINE

## 2022-01-01 PROCEDURE — 71275 CT ANGIOGRAPHY CHEST: CPT

## 2022-01-01 PROCEDURE — 99285 EMERGENCY DEPT VISIT HI MDM: CPT

## 2022-01-01 PROCEDURE — 96367 TX/PROPH/DG ADDL SEQ IV INF: CPT

## 2022-01-01 PROCEDURE — 96417 CHEMO IV INFUS EACH ADDL SEQ: CPT

## 2022-01-01 PROCEDURE — 99232 SBSQ HOSP IP/OBS MODERATE 35: CPT | Performed by: INTERNAL MEDICINE

## 2022-01-01 PROCEDURE — 85007 BL SMEAR W/DIFF WBC COUNT: CPT | Performed by: PHYSICIAN ASSISTANT

## 2022-01-01 PROCEDURE — 36415 COLL VENOUS BLD VENIPUNCTURE: CPT | Performed by: INTERNAL MEDICINE

## 2022-01-01 PROCEDURE — 82550 ASSAY OF CK (CPK): CPT | Performed by: INTERNAL MEDICINE

## 2022-01-01 PROCEDURE — 86900 BLOOD TYPING SEROLOGIC ABO: CPT | Performed by: PHYSICIAN ASSISTANT

## 2022-01-01 PROCEDURE — 87086 URINE CULTURE/COLONY COUNT: CPT | Performed by: EMERGENCY MEDICINE

## 2022-01-01 PROCEDURE — 85610 PROTHROMBIN TIME: CPT | Performed by: EMERGENCY MEDICINE

## 2022-01-01 PROCEDURE — 97162 PT EVAL MOD COMPLEX 30 MIN: CPT

## 2022-01-01 PROCEDURE — 85025 COMPLETE CBC W/AUTO DIFF WBC: CPT

## 2022-01-01 PROCEDURE — 93227 XTRNL ECG REC<48 HR R&I: CPT | Performed by: INTERNAL MEDICINE

## 2022-01-01 PROCEDURE — 84484 ASSAY OF TROPONIN QUANT: CPT | Performed by: EMERGENCY MEDICINE

## 2022-01-01 PROCEDURE — 82948 REAGENT STRIP/BLOOD GLUCOSE: CPT

## 2022-01-01 PROCEDURE — 80076 HEPATIC FUNCTION PANEL: CPT | Performed by: EMERGENCY MEDICINE

## 2022-01-01 PROCEDURE — 84443 ASSAY THYROID STIM HORMONE: CPT | Performed by: INTERNAL MEDICINE

## 2022-01-01 PROCEDURE — 80076 HEPATIC FUNCTION PANEL: CPT | Performed by: PHYSICIAN ASSISTANT

## 2022-01-01 PROCEDURE — 86901 BLOOD TYPING SEROLOGIC RH(D): CPT | Performed by: PHYSICIAN ASSISTANT

## 2022-01-01 PROCEDURE — 73502 X-RAY EXAM HIP UNI 2-3 VIEWS: CPT

## 2022-01-01 PROCEDURE — G0155 HHCP-SVS OF CSW,EA 15 MIN: HCPCS

## 2022-01-01 PROCEDURE — 80048 BASIC METABOLIC PNL TOTAL CA: CPT | Performed by: PHYSICIAN ASSISTANT

## 2022-01-01 PROCEDURE — 85027 COMPLETE CBC AUTOMATED: CPT | Performed by: INTERNAL MEDICINE

## 2022-01-01 PROCEDURE — 99222 1ST HOSP IP/OBS MODERATE 55: CPT | Performed by: INTERNAL MEDICINE

## 2022-01-01 PROCEDURE — 85025 COMPLETE CBC W/AUTO DIFF WBC: CPT | Performed by: EMERGENCY MEDICINE

## 2022-01-01 PROCEDURE — 96372 THER/PROPH/DIAG INJ SC/IM: CPT

## 2022-01-01 PROCEDURE — 96377 APPLICATON ON-BODY INJECTOR: CPT

## 2022-01-01 PROCEDURE — 99285 EMERGENCY DEPT VISIT HI MDM: CPT | Performed by: EMERGENCY MEDICINE

## 2022-01-01 PROCEDURE — 74177 CT ABD & PELVIS W/CONTRAST: CPT

## 2022-01-01 PROCEDURE — 93226 XTRNL ECG REC<48 HR SCAN A/R: CPT

## 2022-01-01 PROCEDURE — 99233 SBSQ HOSP IP/OBS HIGH 50: CPT | Performed by: INTERNAL MEDICINE

## 2022-01-01 PROCEDURE — 71045 X-RAY EXAM CHEST 1 VIEW: CPT

## 2022-01-01 PROCEDURE — 85007 BL SMEAR W/DIFF WBC COUNT: CPT | Performed by: INTERNAL MEDICINE

## 2022-01-01 PROCEDURE — 78815 PET IMAGE W/CT SKULL-THIGH: CPT

## 2022-01-01 PROCEDURE — 99205 OFFICE O/P NEW HI 60 MIN: CPT | Performed by: SURGERY

## 2022-01-01 PROCEDURE — 71250 CT THORAX DX C-: CPT

## 2022-01-01 PROCEDURE — 83880 ASSAY OF NATRIURETIC PEPTIDE: CPT | Performed by: EMERGENCY MEDICINE

## 2022-01-01 PROCEDURE — A9552 F18 FDG: HCPCS

## 2022-01-01 PROCEDURE — 36415 COLL VENOUS BLD VENIPUNCTURE: CPT | Performed by: EMERGENCY MEDICINE

## 2022-01-01 PROCEDURE — 99223 1ST HOSP IP/OBS HIGH 75: CPT | Performed by: INTERNAL MEDICINE

## 2022-01-01 PROCEDURE — 10330063 VN DURABLE MEDICAL EQUIPMENT, SUPPLIES/MEDS

## 2022-01-01 PROCEDURE — 80061 LIPID PANEL: CPT

## 2022-01-01 PROCEDURE — 81003 URINALYSIS AUTO W/O SCOPE: CPT | Performed by: EMERGENCY MEDICINE

## 2022-01-01 PROCEDURE — 74176 CT ABD & PELVIS W/O CONTRAST: CPT

## 2022-01-01 PROCEDURE — 99284 EMERGENCY DEPT VISIT MOD MDM: CPT

## 2022-01-01 PROCEDURE — 99215 OFFICE O/P EST HI 40 MIN: CPT | Performed by: INTERNAL MEDICINE

## 2022-01-01 PROCEDURE — G1004 CDSM NDSC: HCPCS

## 2022-01-01 PROCEDURE — 96374 THER/PROPH/DIAG INJ IV PUSH: CPT

## 2022-01-01 PROCEDURE — 99214 OFFICE O/P EST MOD 30 MIN: CPT | Performed by: STUDENT IN AN ORGANIZED HEALTH CARE EDUCATION/TRAINING PROGRAM

## 2022-01-01 PROCEDURE — 86301 IMMUNOASSAY TUMOR CA 19-9: CPT

## 2022-01-01 PROCEDURE — 93306 TTE W/DOPPLER COMPLETE: CPT | Performed by: INTERNAL MEDICINE

## 2022-01-01 PROCEDURE — G0156 HHCP-SVS OF AIDE,EA 15 MIN: HCPCS

## 2022-01-01 PROCEDURE — 85730 THROMBOPLASTIN TIME PARTIAL: CPT | Performed by: EMERGENCY MEDICINE

## 2022-01-01 PROCEDURE — 96361 HYDRATE IV INFUSION ADD-ON: CPT

## 2022-01-01 PROCEDURE — 86850 RBC ANTIBODY SCREEN: CPT | Performed by: PHYSICIAN ASSISTANT

## 2022-01-01 PROCEDURE — 93225 XTRNL ECG REC<48 HRS REC: CPT

## 2022-01-01 PROCEDURE — 99239 HOSP IP/OBS DSCHRG MGMT >30: CPT | Performed by: INTERNAL MEDICINE

## 2022-01-01 RX ORDER — SODIUM CHLORIDE 9 MG/ML
20 INJECTION, SOLUTION INTRAVENOUS ONCE
Status: COMPLETED | OUTPATIENT
Start: 2022-01-01 | End: 2022-01-01

## 2022-01-01 RX ORDER — SENNA PLUS 8.6 MG/1
1 TABLET ORAL DAILY
Qty: 30 TABLET | Refills: 3 | Status: SHIPPED | OUTPATIENT
Start: 2022-01-01 | End: 2022-01-01

## 2022-01-01 RX ORDER — SODIUM CHLORIDE 9 MG/ML
20 INJECTION, SOLUTION INTRAVENOUS ONCE
Status: CANCELLED | OUTPATIENT
Start: 2022-01-01

## 2022-01-01 RX ORDER — ESCITALOPRAM OXALATE 20 MG/1
20 TABLET ORAL DAILY
Qty: 90 TABLET | Refills: 3 | Status: SHIPPED | OUTPATIENT
Start: 2022-01-01 | End: 2022-01-01

## 2022-01-01 RX ORDER — ATORVASTATIN CALCIUM 20 MG/1
10 TABLET, FILM COATED ORAL DAILY
Qty: 90 TABLET | Refills: 0
Start: 2022-01-01 | End: 2022-01-01

## 2022-01-01 RX ORDER — OXYCODONE HYDROCHLORIDE 20 MG/1
10-20 TABLET ORAL EVERY 4 HOURS PRN
Qty: 90 TABLET | Refills: 0 | Status: SHIPPED | OUTPATIENT
Start: 2022-01-01 | End: 2022-01-01 | Stop reason: SDUPTHER

## 2022-01-01 RX ORDER — DILTIAZEM HYDROCHLORIDE 120 MG/1
120 CAPSULE, COATED, EXTENDED RELEASE ORAL DAILY
Qty: 30 CAPSULE | Refills: 1 | Status: SHIPPED | OUTPATIENT
Start: 2022-01-01 | End: 2022-01-01

## 2022-01-01 RX ORDER — SENNOSIDES 8.6 MG
1 TABLET ORAL DAILY
Refills: 0 | Status: DISCONTINUED | OUTPATIENT
Start: 2022-01-01 | End: 2022-01-01 | Stop reason: HOSPADM

## 2022-01-01 RX ORDER — OXYCODONE HYDROCHLORIDE 10 MG/1
10 TABLET ORAL EVERY 4 HOURS PRN
Status: DISCONTINUED | OUTPATIENT
Start: 2022-01-01 | End: 2022-01-01 | Stop reason: HOSPADM

## 2022-01-01 RX ORDER — ASPIRIN 325 MG
325 TABLET ORAL DAILY
Start: 2022-01-01 | End: 2022-01-01

## 2022-01-01 RX ORDER — ONDANSETRON 4 MG/1
4 TABLET, FILM COATED ORAL EVERY 8 HOURS PRN
Qty: 30 TABLET | Refills: 0 | Status: SHIPPED | OUTPATIENT
Start: 2022-01-01 | End: 2022-01-01 | Stop reason: SDUPTHER

## 2022-01-01 RX ORDER — OXYCODONE HYDROCHLORIDE 10 MG/1
10 TABLET ORAL EVERY 4 HOURS PRN
Qty: 20 TABLET | Refills: 0 | Status: SHIPPED | OUTPATIENT
Start: 2022-01-01 | End: 2022-01-01

## 2022-01-01 RX ORDER — DIGOXIN 0.25 MG/ML
500 INJECTION INTRAMUSCULAR; INTRAVENOUS ONCE
Status: COMPLETED | OUTPATIENT
Start: 2022-01-01 | End: 2022-01-01

## 2022-01-01 RX ORDER — DIGOXIN 125 MCG
125 TABLET ORAL DAILY
Status: DISCONTINUED | OUTPATIENT
Start: 2022-01-01 | End: 2022-01-01

## 2022-01-01 RX ORDER — PROCHLORPERAZINE MALEATE 10 MG
10 TABLET ORAL EVERY 6 HOURS PRN
Qty: 50 TABLET | Refills: 2 | Status: SHIPPED | OUTPATIENT
Start: 2022-01-01

## 2022-01-01 RX ORDER — DILTIAZEM HYDROCHLORIDE 120 MG/1
120 CAPSULE, COATED, EXTENDED RELEASE ORAL DAILY
Status: DISCONTINUED | OUTPATIENT
Start: 2022-01-01 | End: 2022-01-01 | Stop reason: HOSPADM

## 2022-01-01 RX ORDER — ACETAMINOPHEN 500 MG
500 TABLET ORAL EVERY 6 HOURS PRN
Qty: 50 TABLET | Refills: 0 | Status: SHIPPED | OUTPATIENT
Start: 2022-01-01 | End: 2022-01-01

## 2022-01-01 RX ORDER — PROCHLORPERAZINE MALEATE 10 MG
10 TABLET ORAL EVERY 6 HOURS PRN
Status: DISCONTINUED | OUTPATIENT
Start: 2022-01-01 | End: 2022-01-01 | Stop reason: HOSPADM

## 2022-01-01 RX ORDER — ATORVASTATIN CALCIUM 20 MG/1
10 TABLET, FILM COATED ORAL DAILY
Qty: 90 TABLET | Refills: 0
Start: 2022-01-01 | End: 2022-01-01 | Stop reason: SDUPTHER

## 2022-01-01 RX ORDER — FLUTICASONE PROPIONATE 50 MCG
2 SPRAY, SUSPENSION (ML) NASAL DAILY
Status: DISCONTINUED | OUTPATIENT
Start: 2022-01-01 | End: 2022-01-01 | Stop reason: HOSPADM

## 2022-01-01 RX ORDER — SODIUM PHOSPHATE, DIBASIC AND SODIUM PHOSPHATE, MONOBASIC 7; 19 G/133ML; G/133ML
1 ENEMA RECTAL ONCE
Status: COMPLETED | OUTPATIENT
Start: 2022-01-01 | End: 2022-01-01

## 2022-01-01 RX ORDER — SODIUM CHLORIDE 9 MG/ML
75 INJECTION, SOLUTION INTRAVENOUS CONTINUOUS
Status: DISPENSED | OUTPATIENT
Start: 2022-01-01 | End: 2022-01-01

## 2022-01-01 RX ORDER — LOSARTAN POTASSIUM 50 MG/1
50 TABLET ORAL DAILY
Status: DISCONTINUED | OUTPATIENT
Start: 2022-01-01 | End: 2022-01-01

## 2022-01-01 RX ORDER — GABAPENTIN 100 MG/1
100 CAPSULE ORAL 2 TIMES DAILY
Qty: 60 CAPSULE | Refills: 0 | Status: SHIPPED | OUTPATIENT
Start: 2022-01-01 | End: 2022-01-01

## 2022-01-01 RX ORDER — POTASSIUM CHLORIDE 20 MEQ/1
40 TABLET, EXTENDED RELEASE ORAL ONCE
Status: COMPLETED | OUTPATIENT
Start: 2022-01-01 | End: 2022-01-01

## 2022-01-01 RX ORDER — PROCHLORPERAZINE MALEATE 10 MG
10 TABLET ORAL EVERY 6 HOURS PRN
Qty: 30 TABLET | Refills: 0 | Status: SHIPPED | OUTPATIENT
Start: 2022-01-01 | End: 2022-01-01 | Stop reason: SDUPTHER

## 2022-01-01 RX ORDER — OXYCODONE HYDROCHLORIDE 20 MG/1
10-20 TABLET ORAL EVERY 4 HOURS PRN
Qty: 90 TABLET | Refills: 0 | Status: SHIPPED | OUTPATIENT
Start: 2022-01-01 | End: 2022-01-01

## 2022-01-01 RX ORDER — LORAZEPAM 1 MG/1
TABLET ORAL
Qty: 16 TABLET | Refills: 0 | Status: SHIPPED | OUTPATIENT
Start: 2022-01-01

## 2022-01-01 RX ORDER — POTASSIUM CHLORIDE 14.9 MG/ML
20 INJECTION INTRAVENOUS ONCE
Status: COMPLETED | OUTPATIENT
Start: 2022-01-01 | End: 2022-01-01

## 2022-01-01 RX ORDER — DILTIAZEM HYDROCHLORIDE 5 MG/ML
15 INJECTION INTRAVENOUS ONCE
Status: COMPLETED | OUTPATIENT
Start: 2022-01-01 | End: 2022-01-01

## 2022-01-01 RX ORDER — IBUPROFEN 600 MG/1
600 TABLET ORAL 3 TIMES DAILY
Qty: 270 TABLET | Refills: 3 | Status: SHIPPED | OUTPATIENT
Start: 2022-01-01 | End: 2022-01-01

## 2022-01-01 RX ORDER — SODIUM CHLORIDE 9 MG/ML
75 INJECTION, SOLUTION INTRAVENOUS CONTINUOUS
Status: DISCONTINUED | OUTPATIENT
Start: 2022-01-01 | End: 2022-01-01

## 2022-01-01 RX ORDER — ACETAMINOPHEN 325 MG/1
650 TABLET ORAL EVERY 6 HOURS PRN
Status: DISCONTINUED | OUTPATIENT
Start: 2022-01-01 | End: 2022-01-01 | Stop reason: HOSPADM

## 2022-01-01 RX ORDER — ATORVASTATIN CALCIUM 20 MG/1
20 TABLET, FILM COATED ORAL DAILY
Status: DISCONTINUED | OUTPATIENT
Start: 2022-01-01 | End: 2022-01-01 | Stop reason: HOSPADM

## 2022-01-01 RX ORDER — ESCITALOPRAM OXALATE 20 MG/1
20 TABLET ORAL DAILY
Qty: 90 TABLET | Refills: 0 | Status: SHIPPED | OUTPATIENT
Start: 2022-01-01 | End: 2022-01-01 | Stop reason: SDUPTHER

## 2022-01-01 RX ORDER — POLYETHYLENE GLYCOL 3350 17 G/17G
17 POWDER, FOR SOLUTION ORAL DAILY PRN
Status: DISCONTINUED | OUTPATIENT
Start: 2022-01-01 | End: 2022-01-01 | Stop reason: HOSPADM

## 2022-01-01 RX ORDER — METHADONE HYDROCHLORIDE 10 MG/1
10 TABLET ORAL ONCE
Status: COMPLETED | OUTPATIENT
Start: 2022-01-01 | End: 2022-01-01

## 2022-01-01 RX ORDER — GABAPENTIN 100 MG/1
100 CAPSULE ORAL
Qty: 30 CAPSULE | Refills: 0 | Status: SHIPPED | OUTPATIENT
Start: 2022-01-01 | End: 2022-01-01

## 2022-01-01 RX ORDER — DIGOXIN 0.25 MG/ML
250 INJECTION INTRAMUSCULAR; INTRAVENOUS ONCE
Status: COMPLETED | OUTPATIENT
Start: 2022-01-01 | End: 2022-01-01

## 2022-01-01 RX ORDER — ONDANSETRON 4 MG/1
4 TABLET, FILM COATED ORAL EVERY 8 HOURS PRN
Qty: 50 TABLET | Refills: 2 | Status: SHIPPED | OUTPATIENT
Start: 2022-01-01 | End: 2022-01-01

## 2022-01-01 RX ORDER — OXYCODONE HYDROCHLORIDE 30 MG/1
30 TABLET ORAL EVERY 4 HOURS PRN
Qty: 180 TABLET | Refills: 0 | Status: SHIPPED | OUTPATIENT
Start: 2022-01-01 | End: 2022-01-01

## 2022-01-01 RX ORDER — GABAPENTIN 100 MG/1
100 CAPSULE ORAL
Refills: 0
Start: 2022-01-01 | End: 2022-01-01 | Stop reason: SDUPTHER

## 2022-01-01 RX ORDER — FUROSEMIDE 20 MG/1
TABLET ORAL
Qty: 60 TABLET | Refills: 5 | Status: SHIPPED | OUTPATIENT
Start: 2022-01-01 | End: 2022-01-01

## 2022-01-01 RX ADMIN — DILTIAZEM HYDROCHLORIDE 30 MG: 30 TABLET, FILM COATED ORAL at 21:51

## 2022-01-01 RX ADMIN — METOPROLOL TARTRATE 25 MG: 25 TABLET, FILM COATED ORAL at 22:17

## 2022-01-01 RX ADMIN — Medication 126 MG: at 14:47

## 2022-01-01 RX ADMIN — SODIUM CHLORIDE 20 ML/HR: 9 INJECTION, SOLUTION INTRAVENOUS at 13:11

## 2022-01-01 RX ADMIN — DILTIAZEM HYDROCHLORIDE 2.5 MG/HR: 5 INJECTION INTRAVENOUS at 10:34

## 2022-01-01 RX ADMIN — METOPROLOL TARTRATE 25 MG: 25 TABLET, FILM COATED ORAL at 10:19

## 2022-01-01 RX ADMIN — Medication 126 MG: at 14:40

## 2022-01-01 RX ADMIN — DEXAMETHASONE SODIUM PHOSPHATE 10 MG: 10 INJECTION, SOLUTION INTRAMUSCULAR; INTRAVENOUS at 14:00

## 2022-01-01 RX ADMIN — PANCRELIPASE 12000 UNITS: 30000; 6000; 19000 CAPSULE, DELAYED RELEASE PELLETS ORAL at 10:42

## 2022-01-01 RX ADMIN — PANCRELIPASE 12000 UNITS: 30000; 6000; 19000 CAPSULE, DELAYED RELEASE PELLETS ORAL at 17:49

## 2022-01-01 RX ADMIN — SODIUM CHLORIDE 500 ML: 0.9 INJECTION, SOLUTION INTRAVENOUS at 02:10

## 2022-01-01 RX ADMIN — SODIUM CHLORIDE 500 ML: 0.9 INJECTION, SOLUTION INTRAVENOUS at 01:49

## 2022-01-01 RX ADMIN — GEMCITABINE 1255.9 MG: 38 INJECTION, SOLUTION INTRAVENOUS at 15:37

## 2022-01-01 RX ADMIN — GEMCITABINE 1255.9 MG: 38 INJECTION, SOLUTION INTRAVENOUS at 15:56

## 2022-01-01 RX ADMIN — POTASSIUM CHLORIDE 20 MEQ: 14.9 INJECTION, SOLUTION INTRAVENOUS at 14:10

## 2022-01-01 RX ADMIN — OXYCODONE HYDROCHLORIDE 30 MG: 30 TABLET ORAL at 13:03

## 2022-01-01 RX ADMIN — DILTIAZEM HYDROCHLORIDE 30 MG: 30 TABLET, FILM COATED ORAL at 14:11

## 2022-01-01 RX ADMIN — SODIUM CHLORIDE 20 ML/HR: 9 INJECTION, SOLUTION INTRAVENOUS at 13:59

## 2022-01-01 RX ADMIN — DILTIAZEM HYDROCHLORIDE 30 MG: 30 TABLET, FILM COATED ORAL at 23:14

## 2022-01-01 RX ADMIN — OXYCODONE HYDROCHLORIDE 30 MG: 30 TABLET ORAL at 12:33

## 2022-01-01 RX ADMIN — SODIUM CHLORIDE 75 ML/HR: 0.9 INJECTION, SOLUTION INTRAVENOUS at 00:07

## 2022-01-01 RX ADMIN — SODIUM PHOSPHATE, DIBASIC AND SODIUM PHOSPHATE, MONOBASIC 1 ENEMA: 7; 19 ENEMA RECTAL at 15:21

## 2022-01-01 RX ADMIN — STANDARDIZED SENNA CONCENTRATE 8.6 MG: 8.6 TABLET ORAL at 10:41

## 2022-01-01 RX ADMIN — IOHEXOL 100 ML: 350 INJECTION, SOLUTION INTRAVENOUS at 09:40

## 2022-01-01 RX ADMIN — DEXAMETHASONE SODIUM PHOSPHATE 10 MG: 10 INJECTION, SOLUTION INTRAMUSCULAR; INTRAVENOUS at 14:15

## 2022-01-01 RX ADMIN — DILTIAZEM HYDROCHLORIDE 15 MG: 5 INJECTION INTRAVENOUS at 19:16

## 2022-01-01 RX ADMIN — PANCRELIPASE 12000 UNITS: 30000; 6000; 19000 CAPSULE, DELAYED RELEASE PELLETS ORAL at 12:56

## 2022-01-01 RX ADMIN — DIGOXIN 250 MCG: 0.25 INJECTION INTRAMUSCULAR; INTRAVENOUS at 11:18

## 2022-01-01 RX ADMIN — ENOXAPARIN SODIUM 50 MG: 60 INJECTION SUBCUTANEOUS at 11:20

## 2022-01-01 RX ADMIN — Medication 126 MG: at 14:48

## 2022-01-01 RX ADMIN — PANCRELIPASE 12000 UNITS: 30000; 6000; 19000 CAPSULE, DELAYED RELEASE PELLETS ORAL at 18:20

## 2022-01-01 RX ADMIN — OXYCODONE HYDROCHLORIDE 10 MG: 10 TABLET ORAL at 10:09

## 2022-01-01 RX ADMIN — PEGFILGRASTIM 6 MG: KIT SUBCUTANEOUS at 16:44

## 2022-01-01 RX ADMIN — DILTIAZEM HYDROCHLORIDE 30 MG: 30 TABLET, FILM COATED ORAL at 02:10

## 2022-01-01 RX ADMIN — DILTIAZEM HYDROCHLORIDE 30 MG: 30 TABLET, FILM COATED ORAL at 05:58

## 2022-01-01 RX ADMIN — DEXAMETHASONE SODIUM PHOSPHATE 10 MG: 10 INJECTION, SOLUTION INTRAMUSCULAR; INTRAVENOUS at 14:07

## 2022-01-01 RX ADMIN — Medication 126 MG: at 13:52

## 2022-01-01 RX ADMIN — ATORVASTATIN CALCIUM 20 MG: 20 TABLET, FILM COATED ORAL at 15:22

## 2022-01-01 RX ADMIN — DIGOXIN 125 MCG: 125 TABLET ORAL at 10:41

## 2022-01-01 RX ADMIN — DEXAMETHASONE SODIUM PHOSPHATE 10 MG: 10 INJECTION, SOLUTION INTRAMUSCULAR; INTRAVENOUS at 11:25

## 2022-01-01 RX ADMIN — Medication 126 MG: at 14:21

## 2022-01-01 RX ADMIN — ENOXAPARIN SODIUM 50 MG: 60 INJECTION SUBCUTANEOUS at 21:44

## 2022-01-01 RX ADMIN — OXYCODONE HYDROCHLORIDE 30 MG: 30 TABLET ORAL at 14:48

## 2022-01-01 RX ADMIN — GEMCITABINE 1255.9 MG: 38 INJECTION, SOLUTION INTRAVENOUS at 15:58

## 2022-01-01 RX ADMIN — DILTIAZEM HYDROCHLORIDE 30 MG: 30 TABLET, FILM COATED ORAL at 05:25

## 2022-01-01 RX ADMIN — FLUTICASONE PROPIONATE 2 SPRAY: 50 SPRAY, METERED NASAL at 08:20

## 2022-01-01 RX ADMIN — METOPROLOL TARTRATE 25 MG: 25 TABLET, FILM COATED ORAL at 15:22

## 2022-01-01 RX ADMIN — GEMCITABINE 1255.9 MG: 38 INJECTION, SOLUTION INTRAVENOUS at 15:40

## 2022-01-01 RX ADMIN — IOHEXOL 100 ML: 350 INJECTION, SOLUTION INTRAVENOUS at 19:00

## 2022-01-01 RX ADMIN — SODIUM CHLORIDE 20 ML/HR: 0.9 INJECTION, SOLUTION INTRAVENOUS at 13:25

## 2022-01-01 RX ADMIN — GEMCITABINE 1255.9 MG: 38 INJECTION, SOLUTION INTRAVENOUS at 16:03

## 2022-01-01 RX ADMIN — PANCRELIPASE 12000 UNITS: 30000; 6000; 19000 CAPSULE, DELAYED RELEASE PELLETS ORAL at 18:31

## 2022-01-01 RX ADMIN — DEXAMETHASONE SODIUM PHOSPHATE 10 MG: 10 INJECTION, SOLUTION INTRAMUSCULAR; INTRAVENOUS at 14:09

## 2022-01-01 RX ADMIN — PANCRELIPASE 12000 UNITS: 30000; 6000; 19000 CAPSULE, DELAYED RELEASE PELLETS ORAL at 11:26

## 2022-01-01 RX ADMIN — GEMCITABINE 1255.9 MG: 38 INJECTION, SOLUTION INTRAVENOUS at 13:24

## 2022-01-01 RX ADMIN — ATORVASTATIN CALCIUM 20 MG: 20 TABLET, FILM COATED ORAL at 17:27

## 2022-01-01 RX ADMIN — POTASSIUM CHLORIDE 40 MEQ: 1500 TABLET, EXTENDED RELEASE ORAL at 14:14

## 2022-01-01 RX ADMIN — ENOXAPARIN SODIUM 50 MG: 60 INJECTION SUBCUTANEOUS at 19:17

## 2022-01-01 RX ADMIN — Medication 126 MG: at 14:50

## 2022-01-01 RX ADMIN — SODIUM CHLORIDE 20 ML/HR: 9 INJECTION, SOLUTION INTRAVENOUS at 14:07

## 2022-01-01 RX ADMIN — GEMCITABINE 1255.9 MG: 38 INJECTION, SOLUTION INTRAVENOUS at 15:02

## 2022-01-01 RX ADMIN — PANCRELIPASE 12000 UNITS: 30000; 6000; 19000 CAPSULE, DELAYED RELEASE PELLETS ORAL at 08:20

## 2022-01-01 RX ADMIN — METHADONE HYDROCHLORIDE 10 MG: 10 TABLET ORAL at 15:46

## 2022-01-01 RX ADMIN — OXYCODONE HYDROCHLORIDE 30 MG: 30 TABLET ORAL at 12:00

## 2022-01-01 RX ADMIN — SODIUM CHLORIDE 20 ML/HR: 0.9 INJECTION, SOLUTION INTRAVENOUS at 11:15

## 2022-01-01 RX ADMIN — DILTIAZEM HYDROCHLORIDE 30 MG: 30 TABLET, FILM COATED ORAL at 23:00

## 2022-01-01 RX ADMIN — ATORVASTATIN CALCIUM 20 MG: 20 TABLET, FILM COATED ORAL at 17:49

## 2022-01-01 RX ADMIN — STANDARDIZED SENNA CONCENTRATE 8.6 MG: 8.6 TABLET ORAL at 10:19

## 2022-01-01 RX ADMIN — DEXAMETHASONE SODIUM PHOSPHATE 10 MG: 10 INJECTION, SOLUTION INTRAMUSCULAR; INTRAVENOUS at 13:57

## 2022-01-01 RX ADMIN — Medication 126 MG: at 14:39

## 2022-01-01 RX ADMIN — Medication 126 MG: at 12:12

## 2022-01-01 RX ADMIN — STANDARDIZED SENNA CONCENTRATE 8.6 MG: 8.6 TABLET ORAL at 08:20

## 2022-01-01 RX ADMIN — DILTIAZEM HYDROCHLORIDE 30 MG: 30 TABLET, FILM COATED ORAL at 07:17

## 2022-01-01 RX ADMIN — ENOXAPARIN SODIUM 50 MG: 60 INJECTION SUBCUTANEOUS at 14:11

## 2022-01-01 RX ADMIN — PANCRELIPASE 12000 UNITS: 30000; 6000; 19000 CAPSULE, DELAYED RELEASE PELLETS ORAL at 13:06

## 2022-01-01 RX ADMIN — DILTIAZEM HYDROCHLORIDE 5 MG/HR: 5 INJECTION INTRAVENOUS at 00:07

## 2022-01-01 RX ADMIN — GEMCITABINE 1255.9 MG: 38 INJECTION, SOLUTION INTRAVENOUS at 16:08

## 2022-01-01 RX ADMIN — SODIUM CHLORIDE 500 ML: 0.9 INJECTION, SOLUTION INTRAVENOUS at 18:32

## 2022-01-01 RX ADMIN — DIGOXIN 125 MCG: 125 TABLET ORAL at 11:18

## 2022-01-01 RX ADMIN — METOPROLOL TARTRATE 25 MG: 25 TABLET, FILM COATED ORAL at 23:22

## 2022-01-01 RX ADMIN — DILTIAZEM HYDROCHLORIDE 120 MG: 120 CAPSULE, COATED, EXTENDED RELEASE ORAL at 11:27

## 2022-01-01 RX ADMIN — PANCRELIPASE 12000 UNITS: 30000; 6000; 19000 CAPSULE, DELAYED RELEASE PELLETS ORAL at 14:14

## 2022-01-01 RX ADMIN — POLYETHYLENE GLYCOL 3350 17 G: 17 POWDER, FOR SOLUTION ORAL at 08:43

## 2022-01-01 RX ADMIN — DEXAMETHASONE SODIUM PHOSPHATE 10 MG: 10 INJECTION, SOLUTION INTRAMUSCULAR; INTRAVENOUS at 13:13

## 2022-01-01 RX ADMIN — DILTIAZEM HYDROCHLORIDE 30 MG: 30 TABLET, FILM COATED ORAL at 12:39

## 2022-01-01 RX ADMIN — PEGFILGRASTIM 6 MG: KIT SUBCUTANEOUS at 15:36

## 2022-01-01 RX ADMIN — OXYCODONE HYDROCHLORIDE 10 MG: 10 TABLET ORAL at 23:22

## 2022-01-01 RX ADMIN — DILTIAZEM HYDROCHLORIDE 30 MG: 30 TABLET, FILM COATED ORAL at 17:27

## 2022-01-01 RX ADMIN — ENOXAPARIN SODIUM 50 MG: 60 INJECTION SUBCUTANEOUS at 10:40

## 2022-01-01 RX ADMIN — DEXAMETHASONE SODIUM PHOSPHATE 10 MG: 10 INJECTION, SOLUTION INTRAMUSCULAR; INTRAVENOUS at 13:38

## 2022-01-01 RX ADMIN — SODIUM CHLORIDE 20 ML/HR: 0.9 INJECTION, SOLUTION INTRAVENOUS at 14:15

## 2022-01-01 RX ADMIN — METHADONE HYDROCHLORIDE 10 MG: 10 TABLET ORAL at 14:48

## 2022-01-01 RX ADMIN — SODIUM CHLORIDE 20 ML/HR: 0.9 INJECTION, SOLUTION INTRAVENOUS at 14:09

## 2022-01-01 RX ADMIN — METOPROLOL TARTRATE 25 MG: 25 TABLET, FILM COATED ORAL at 08:20

## 2022-01-01 RX ADMIN — DILTIAZEM HYDROCHLORIDE 30 MG: 30 TABLET, FILM COATED ORAL at 11:18

## 2022-01-01 RX ADMIN — PEGFILGRASTIM 6 MG: KIT SUBCUTANEOUS at 16:34

## 2022-01-01 RX ADMIN — SODIUM CHLORIDE 20 ML/HR: 0.9 INJECTION, SOLUTION INTRAVENOUS at 13:55

## 2022-01-01 RX ADMIN — DIGOXIN 500 MCG: 0.25 INJECTION INTRAMUSCULAR; INTRAVENOUS at 14:11

## 2022-01-03 NOTE — PATIENT INSTRUCTIONS
Nutrition Rx & Recommendations:  · Diet: High Calorie, High Protein (for high calorie foods see pages 52-53, and for high protein foods see pages 49-51 in your Eating Hints book)  · For constipation: drink plenty of fluids (>64 oz/day); drink hot liquids; eat high-fiber foods as tolerated (whole grains, beans, peas, nuts, seeds, fruits, vegetables, etc); increase physical activity as tolerated  Avoid increasing fiber intake too quickly, add fiber into your diet slowly; keep a record of your bowel movements (see pages 13-14 in you Eating Hints book)  · Small, frequent snacks may be easier to tolerate than 3 large daily meals  Aim for 5-6 small meals per day; eat every 2-3 hours  · Include protein at all meals/snacks  · Avoid high-fat, greasy or fried foods  · Avoid high sugar foods (ex  >10 grams of sugar per serving)  · Include a variety of foods (as tolerated/allowed by diet)  · Stay hydrated by sipping fluids of choice/tolerance throughout the day, (48-64 oz  Per day)  Avoid carbonated beverages  · Liquid nutrition may be better tolerated than solids at times  · Alter food choices and eating patterns to accommodate changing needs  · Light physical activity (such as walking) is encouraged, as able/tolerated  · Refer to Eating Hints book and Diet and Nutrition book for other meal/snack ideas and symptom management  · Weigh yourself regularly  If you notice weight loss, make an effort to increase your daily food/calorie intake  If you continue to notice loss after these efforts, reach out to your dietitian to establish a plan to stabilize weight      Follow Up Plan: During infusion 1/17/22  Recommend Referral to Other Providers: none at this time

## 2022-01-11 NOTE — TELEPHONE ENCOUNTER
Refill request for Oxycodone 20mg tablet    Patient has 12 tablets left    [address] [facility]    Next scheduled appt 1/19/2021

## 2022-01-14 NOTE — PROGRESS NOTES
Pt presents for lab specimen collection via port a cath  Port accessed, labs drawn, saline locked and de accessed without difficulty  Declined AVS, next appointment reviewed  None

## 2022-01-17 NOTE — PROGRESS NOTES
Pt here for chemotherapy, abraxane/Gemzar, offering no complaints  Right port accessed with positive blood return noted throughout treatment  Tolerated infusion without incident  Port flushed and de-accessed  AVS given    Walked out in stable condition

## 2022-01-17 NOTE — PATIENT INSTRUCTIONS
Nutrition Rx & Recommendations:  · Diet: High Calorie, High Protein (for high calorie foods see pages 52-53, and for high protein foods see pages 49-51 in your Eating Hints book)  · For constipation: drink plenty of fluids (>64 oz/day); drink hot liquids; eat high-fiber foods as tolerated (whole grains, beans, peas, nuts, seeds, fruits, vegetables, etc); increase physical activity as tolerated  Avoid increasing fiber intake too quickly, add fiber into your diet slowly; keep a record of your bowel movements (see pages 13-14 in you Eating Hints book)  · Small, frequent snacks may be easier to tolerate than 3 large daily meals  Aim for 5-6 small meals per day; eat every 2-3 hours  · Include protein at all meals/snacks  · Avoid high-fat, greasy or fried foods  · Avoid high sugar foods (ex  >10 grams of sugar per serving)  · Include a variety of foods (as tolerated/allowed by diet)  · Stay hydrated by sipping fluids of choice/tolerance throughout the day, (48-64 oz  Per day)  Avoid carbonated beverages  · Liquid nutrition may be better tolerated than solids at times  · Alter food choices and eating patterns to accommodate changing needs  · Light physical activity (such as walking) is encouraged, as able/tolerated  · Refer to Eating Hints book and Diet and Nutrition book for other meal/snack ideas and symptom management  · Weigh yourself regularly  If you notice weight loss, make an effort to increase your daily food/calorie intake  If you continue to notice loss after these efforts, reach out to your dietitian to establish a plan to stabilize weight    · High calorie foods to try: peanut butter, cream soups, butter, cheese  (see pages 52-53 in your Eating Hints book)    Follow Up Plan: During infusion 1/31/22  Recommend Referral to Other Providers: none at this time

## 2022-01-17 NOTE — PROGRESS NOTES
Outpatient Oncology Nutrition Consultation   Type of Consult: Follow Up  Care Location: [facility]    Reason for referral: Palliative Care on 12/7/21 (reason for referral: pancreatic cancer and weight loss)  Nutrition Assessment:   Oncology Diagnosis & Treatments: Diagnosed with pancreatic cancer 10/25/21  Began chemotherapy (neulasta, abraxane, gemzar) 11/8/21     Oncology History   Pancreatic adenocarcinoma ([facility] )   10/25/2021 Initial Diagnosis    Panc[facility] ([facility] )     11/8/2021 -  Chemotherapy    pegfilgrastim (NEULASTA ONPRO), 6 mg, Subcutaneous, Once, 2 of 5 cycles  Administration: 6 mg (11/8/2021), 6 mg (12/6/2021)  paclitaxel protein-bound (ABRAXANE) IVPB, 100 mg/m2 = 157 mg (80 % of original dose 125 mg/m2), Intravenous, Once, 3 of 6 cycles  Dose modification: 100 mg/m2 (original dose 125 mg/m2, Cycle 1, Reason: Performance Status), 80 mg/m2 (original dose 125 mg/m2, Cycle 3, Reason: Performance Status)  Administration: 157 mg (11/8/2021), 157 mg (12/6/2021), 157 mg (12/20/2021), 126 mg (1/3/2022)  gemcitabine (GEMZAR) infusion, 1,000 mg/m2 = 1,570 mg, Intravenous, Once, 3 of 6 cycles  Dose modification: 800 mg/m2 (original dose 1,000 mg/m2, Cycle 3, Reason: Performance Status)  Administration: 1,570 mg (11/8/2021), 1,570 mg (12/6/2021), 1,570 mg (12/20/2021), 1,255 9 mg (1/3/2022)       Past Medical & Surgical Hx:   Patient Active Problem List   Diagnosis    DDD (degenerative disc disease), lumbar    Chronic pancreatitis ([facility] )    Mixed hyperlipidemia    Allergic rhinitis    Impaired fasting glucose    Essential hypertension    Tobacco use    Osteoporosis    Reactive depression    Weight loss    Anemia    Drug-induced constipation    Anxiety    Spinal stenosis    Smoking    Pancreatic adenocarcinoma ([facility] )    Encounter for chemotherapy management    Mild protein-calorie malnutrition [facility]    At high risk for infection due to chemotherapy    Port-A-Cath in place    Medical marijuana use     Past Medical History:   Diagnosis Date    Anxiety     [facility] )     Depression     Deviated nasal septum     H/O colonoscopy     6/7/10    Hyperlipidemia     Hypertension     Impaired fasting glucose     Spinal stenosis      Past Surgical History:   Procedure Laterality Date    APPENDECTOMY      CHOLECYSTECTOMY      EXPLORATORY LAPAROTOMY      IR PORT PLACEMENT  11/2/2021    TUBAL LIGATION         Review of Medications:   Vitamins, Supplements and Herbals: No, pt denies taking supplements    Current Outpatient Medications:     atorvastatin (LIPITOR) 20 mg tablet, TAKE 1 TABLET EVERY DAY, Disp: 90 tablet, Rfl: 3    escitalopram (LEXAPRO) 20 mg tablet, Take 1 tablet (20 mg total) by mouth daily (Patient not taking: Reported on 11/9/2021 ), Disp: 90 tablet, Rfl: 5    fluticasone (FLONASE) 50 mcg/act nasal spray, 2 sprays into each nostril daily, Disp: , Rfl:      MG tablet, TAKE 1 TABLET (600 MG TOTAL) BY MOUTH 3 (THREE) TIMES A DAY (Patient not taking: Reported on 12/27/2021), Disp: 270 tablet, Rfl: 3    losartan (COZAAR) 50 mg tablet, TAKE 1 TABLET (50 MG TOTAL) BY MOUTH DAILY, Disp: 90 tablet, Rfl: 3    lubiprostone (AMITIZA) 24 mcg capsule, Take 1 capsule (24 mcg total) by mouth 2 (two) times a day with meals for 14 days, Disp: 28 capsule, Rfl: 1    ondansetron (ZOFRAN) 4 mg tablet, Take 1 tablet (4 mg total) by mouth every 8 (eight) hours as needed for nausea or vomiting, Disp: 30 tablet, Rfl: 0    oxyCODONE (ROXICODONE) 20 MG TABS, Take 0 5-1 tablets (10-20 mg total) by mouth every 4 (four) hours as needed for moderate pain Max Daily Amount: 120 mg, Disp: 90 tablet, Rfl: 0    pancrelipase, Lip-Prot-Amyl, (CREON) 12,000 units capsule, Take 12,000 units of lipase by mouth 3 (three) times a day with meals, Disp: 270 capsule, Rfl: 3    polyethylene glycol (MIRALAX) 17 g packet, Take 17 g by mouth daily as needed (Constipation), Disp: 20 each, Rfl: 0    prochlorperazine (COMPAZINE) 10 mg tablet, Take 1 tablet (10 mg total) by mouth every 6 (six) hours as needed for nausea or vomiting, Disp: 30 tablet, Rfl: 0    senna (SENOKOT) 8 6 MG tablet, Take 1 tablet (8 6 mg total) by mouth daily, Disp: 30 tablet, Rfl: 0  No current facility-administered medications for this visit      Facility-Administered Medications Ordered in Other Visits:     gemcitabine (GEMZAR) 1,255 9 mg in sodium chloride 0 9 % 250 mL infusion, 800 mg/m2 (Treatment Plan Recorded), Intravenous, Once, Beatrice Lopez MD PhD    PACLitaxel protein bound (ABRAXANE) 126 mg in IVPB 25 2 mL, 80 mg/m2 (Treatment Plan Recorded), Intravenous, Once, Beatrice Lopez MD PhD, Last Rate: 50 4 mL/hr at 01/17/22 1212, 126 mg at 01/17/22 1212    Most Recent Lab Results:   Lab Results   Component Value Date    WBC 5 91 01/14/2022    NEUTROABS 3 95 01/14/2022    CHOLESTEROL 136 09/10/2021    TRIG 58 09/10/2021    HDL 66 09/10/2021    LDLCALC 58 09/10/2021    ALT 34 01/14/2022    AST 15 01/14/2022    ALB 3 3 (L) 01/14/2022    SODIUM 140 01/14/2022    SODIUM 140 12/30/2021    K 3 5 01/14/2022    K 3 7 12/30/2021     01/14/2022    BUN 13 01/14/2022    BUN 12 12/30/2021    CREATININE 0 76 01/14/2022    CREATININE 0 73 12/30/2021    EGFR 74 01/14/2022    GLUF 122 (H) 09/10/2021    GLUF 130 (H) 02/16/2021    GLUC 156 (H) 01/14/2022    HGBA1C 5 9 (H) 09/10/2021    HGBA1C 5 9 (H) 02/16/2021    HGBA1C 5 8 04/16/2019    CALCIUM 8 8 01/14/2022       Anthropometric Measurements:   Height: 66"  Ht Readings from Last 1 Encounters:   01/17/22 5' 5 98" (1 676 m)     Wt Readings from Last 20 Encounters:   01/17/22 49 7 kg (109 lb 9 6 oz)   01/03/22 48 kg (105 lb 12 8 oz)   12/27/21 47 6 kg (105 lb)   12/20/21 49 7 kg (109 lb 9 1 oz)   12/07/21 50 8 kg (112 lb)   12/06/21 50 6 kg (111 lb 8 8 oz)   12/06/21 50 3 kg (111 lb)   11/26/21 49 9 kg (110 lb)   11/09/21 50 7 kg (111 lb 12 8 oz)   11/08/21 50 2 kg (110 lb 10 7 oz) 11/02/21 49 6 kg (109 lb 5 6 oz)   11/02/21 49 4 kg (109 lb)   10/25/21 51 3 kg (113 lb)   10/18/21 51 7 kg (114 lb)   10/13/21 52 2 kg (115 lb)   09/17/21 54 4 kg (120 lb)   08/09/21 55 1 kg (121 lb 6 4 oz)   04/06/21 61 5 kg (135 lb 9 6 oz)   08/24/20 65 3 kg (144 lb)   11/05/19 67 6 kg (149 lb)       Weight History:    Usual Weight: 145#    Oncology Nutrition-Anthropometrics      Nutrition from 1/17/2022 in [address] [place] [address] Nutrition from 1/3/2022 in [address] [place] [address]   Patient age (years): 80 years 80 years   Patient (female) height (in): 77 in 77 in   Current Weight to be used for anthropometric calculations (lbs) 109 6 lbs 105 8 lbs   Current Weight to be used for anthropometric calculations (kg) 49 8 kg 48 1 kg   BMI: 17 7 17 1   IBW female: 130 lbs 130 lbs   IBW (kg) female: 59 1 kg 59 1 kg   IBW % (female) 84 3 % 81 4 %   Adjusted BW (female): 124 9 lbs 124 lbs   Adjusted BW kg (female): 56 8 kg 56 4 kg   % weight change after 1 week: -- 0 8 %   Weight change after 1 week (lbs) -- 0 8 lbs   % weight change after 1 month: 0 % -5 2 %   Weight change after 1 month (lbs) 0 lbs -5 8 lbs   % weight change after 3 months: -3 9 % -8 %   Weight change after 3 months (lbs) -4 4 lbs -9 2 lbs          Nutrition-Focused Physical Findings: severe muscle depletion (Clavicle) - protruding/visible bone    Food/Nutrition-Related History & Client/Social History:    Current Nutrition Impact Symptoms:  [] Nausea -has zofran  [x] Reduced Appetite -improved during second week after chemo  [] Acid Reflux    [] Vomiting  [x] Unintended Wt Loss  [] Malabsorption    [] Diarrhea  [] Unintended Wt Gain  [] Dumping Syndrome    [] Constipation -takes Miralax every other day [] Thick Mucous/Secretions  [x] Abdominal Pain   -with food intake  -takes creon    [x] Dysgeusia (Altered Taste) -starts to improve during second week after chemo  [] Xerostomia (Dry Mouth)  [] Gas    [x] Dysosmia (Altered Smell)  [] Shaggy Chandananier  [] Difficulty Chewing    [] Oral Mucositis (Sore Mouth)  [x] Fatigue  [x] Hyperglycemia -hba1c 5 9% 9/10/21   [] Odynophagia  [] Esophagitis  [] Other:    [] Dysphagia  [] Early Satiety  [] No Problems Eating      Food Allergies & Intolerances: no    Current Diet: Regular Diet, No Restrictions  Current Nutrition Intake: Unchanged from last visit  Appetite: Fair , Poor  Nutrition Route: PO  Oral Care: brushes BID  Activity level: ADL, goes to work 2 days a week      25 Hr Diet Recall:   Breakfast: oatmeal made with whole milk and butter, sweetened with monkfruit or cheerios   Snack: cookies or chocolate candies OR peanut butter   Lunch: usually has to force herself to eat something, will eat toast or PB sandwich  Dinner: shake and baked pork chops, and mashed potatoes; will have fish 2 nights/ week     Beverages: iced tea mixed with bottled water, coffee (12oz x1)   Supplements:    None      Oncology Nutrition-Estimated Needs      Nutrition from 2022 in [address] [place] [address] Nutrition from 1/3/2022 in [facility] Dietitian Alfredo   Weight type used IBW IBW   Weight in kilograms (kg) used for estimated needs 59 1 kg --   Weight in kilograms (kg) used for estimated needs -- 59 1 kg   Energy needs formula:  30-35 kcal/kg 35-40 kcal/kg   Energy needs based on 30 kcal/k kcal --   Energy needs based on 35 kcal/k kcal --   Energy needs based on 35 kcal/kg: -- 2068 kcal   Energy needs based on 40 kcal/kg: -- 2364 kcal   Protein needs formula: 1 2-1 5 g/kg 1 5-2 g/kg   Protein needs based on 1 2 g/k g --   Protein needs based on 1 5 g/kg 89 g --   Protein needs based on 1 5 g/kg -- 89 g   Protein needs based on 2 g/kg -- 118 g   Fluid needs formula: 30-35 mL/kg 30-35 mL/kg   Fluid needs based on 30 mL/kg 1773 mL 1773 mL   Fluid needs in ounces 60 oz 60 oz   Fluid needs based on 35 mL/kg 2069 mL 2069 mL   Fluid needs in ounces 70 oz 70 oz Discussion & Intervention:   [facility] was evaluated today for an RD follow up regarding wt loss and poor po intake  [facility] is currently undergoing tx for pancreatic cancer  Today [facility] explains that she is doing okay  She explains that 2 weeks after her chemo treatments she begins to feel better, her taste improves, and she is able to eat better  She has increased the amount of peanut butter that she is eating to aid in calorie and protein intake  Encouraged her to continue her efforts to manage weight  Moving forward, [facility] will continue current nutrition plan of care  Materials Provided: not applicable  All questions and concerns addressed during todays visit  [facility] has RD contact information  Nutrition Diagnosis:    Inadequate Energy Intake related to physiological causes, disease state and treatment related issues as evidenced by food recall, wt loss and discussion with pt and/or family   Increased Nutrient Needs (kcal & pro) related to increased demand for nutrients and disease state as evidenced by cancer dx and pt undergoing tx for cancer   Underweight related to physiological causes as evidenced by BMI 17 7  Monitoring & Evaluation:   Goals:  · weight maintenance/stabilization  · adequate nutrition impact symptom management  · pt to meet >/=75% estimated nutrition needs daily  · increase calorie intake    · Progress Towards Goals: Progressing    Nutrition Rx & Recommendations:  · Diet: High Calorie, High Protein (for high calorie foods see pages 52-53, and for high protein foods see pages 49-51 in your Eating Hints book)  · For constipation: drink plenty of fluids (>64 oz/day); drink hot liquids; eat high-fiber foods as tolerated (whole grains, beans, peas, nuts, seeds, fruits, vegetables, etc); increase physical activity as tolerated    Avoid increasing fiber intake too quickly, add fiber into your diet slowly; keep a record of your bowel movements (see pages 13-14 in you Eating Hints book)   · Small, frequent snacks may be easier to tolerate than 3 large daily meals  Aim for 5-6 small meals per day; eat every 2-3 hours  · Include protein at all meals/snacks  · Avoid high-fat, greasy or fried foods  · Avoid high sugar foods (ex  >10 grams of sugar per serving)  · Include a variety of foods (as tolerated/allowed by diet)  · Stay hydrated by sipping fluids of choice/tolerance throughout the day, (48-64 oz  Per day)  Avoid carbonated beverages  · Liquid nutrition may be better tolerated than solids at times  · Alter food choices and eating patterns to accommodate changing needs  · Light physical activity (such as walking) is encouraged, as able/tolerated  · Refer to Eating Hints book and Diet and Nutrition book for other meal/snack ideas and symptom management  · Weigh yourself regularly  If you notice weight loss, make an effort to increase your daily food/calorie intake  If you continue to notice loss after these efforts, reach out to your dietitian to establish a plan to stabilize weight    · High calorie foods to try: peanut butter, cream soups, butter, cheese  (see pages 52-53 in your Eating Hints book)    Follow Up Plan: During infusion 1/31/22  Recommend Referral to Other Providers: none at this time

## 2022-01-19 PROBLEM — I48.91 ATRIAL FIBRILLATION WITH RVR (HCC): Status: ACTIVE | Noted: 2022-01-01

## 2022-01-19 PROBLEM — G89.3 CANCER RELATED PAIN: Status: ACTIVE | Noted: 2022-01-01

## 2022-01-19 PROBLEM — R79.89 ELEVATED BRAIN NATRIURETIC PEPTIDE (BNP) LEVEL: Status: ACTIVE | Noted: 2022-01-01

## 2022-01-19 PROBLEM — Z51.5 PALLIATIVE CARE PATIENT: Status: ACTIVE | Noted: 2022-01-01

## 2022-01-19 NOTE — TELEPHONE ENCOUNTER
Son called office  Reports pt seen in office today and was recommended pt go to ED re rapid heart rate       Pt declined but heart rate has continued at 130 to  140 bpm      Now presenting to [facility] ED

## 2022-01-19 NOTE — ED PROVIDER NOTES
nHistory  Chief Complaint   Patient presents with    Rapid Heart Rate     Pt reports she has a high heart rate all day with SOB  81 yo female actively undergoing chemo for pancreatic adenoca who presents to ED for new-onset tachycardia with associated dyspnea  States she woke up with it today  No h/o similar sxs  No known arrhythmia or h/o AF  No h/o significant bleeding  No chest pain  No cough or fever  Dyspnea mild to moderate  Additional history from son at bedside and review of outpt office visit from earlier today with palliative care  Prior to Admission Medications   Prescriptions Last Dose Informant Patient Reported? Taking?     MG tablet  Self No No   Sig: TAKE 1 TABLET (600 MG TOTAL) BY MOUTH 3 (THREE) TIMES A DAY   Patient not taking: Reported on 2021   atorvastatin (LIPITOR) 20 mg tablet  Self No No   Sig: TAKE 1 TABLET EVERY DAY   escitalopram (LEXAPRO) 20 mg tablet  Self No No   Sig: Take 1 tablet (20 mg total) by mouth daily   Patient not taking: Reported on 2021    fluticasone (FLONASE) 50 mcg/act nasal spray  Self Yes No   Si sprays into each nostril daily   losartan (COZAAR) 50 mg tablet  Self No No   Sig: TAKE 1 TABLET (50 MG TOTAL) BY MOUTH DAILY   lubiprostone (AMITIZA) 24 mcg capsule   No No   Sig: Take 1 capsule (24 mcg total) by mouth 2 (two) times a day with meals for 14 days   ondansetron (ZOFRAN) 4 mg tablet  Self No No   Sig: Take 1 tablet (4 mg total) by mouth every 8 (eight) hours as needed for nausea or vomiting   oxyCODONE (ROXICODONE) 20 MG TABS   No No   Sig: Take 0 5-1 tablets (10-20 mg total) by mouth every 4 (four) hours as needed for moderate pain Max Daily Amount: 120 mg   pancrelipase, Lip-Prot-Amyl, (CREON) 12,000 units capsule  Self No No   Sig: Take 12,000 units of lipase by mouth 3 (three) times a day with meals   polyethylene glycol (MIRALAX) 17 g packet  Self No No   Sig: Take 17 g by mouth daily as needed (Constipation) prochlorperazine (COMPAZINE) 10 mg tablet  Self No No   Sig: Take 1 tablet (10 mg total) by mouth every 6 (six) hours as needed for nausea or vomiting   senna (SENOKOT) 8 6 MG tablet  Self No No   Sig: Take 1 tablet (8 6 mg total) by mouth daily      Facility-Administered Medications: None       Past Medical History:   Diagnosis Date    Anxiety     Chronic pancreatitis ([address] )     Depression     Deviated nasal septum     H/O colonoscopy     6/7/10    Hyperlipidemia     Hypertension     Impaired fasting glucose     Spinal stenosis        Past Surgical History:   Procedure Laterality Date    APPENDECTOMY      CHOLECYSTECTOMY      EXPLORATORY LAPAROTOMY      IR PORT PLACEMENT  11/2/2021    TUBAL LIGATION         Family History   Problem Relation Age of Onset    Multiple myeloma Mother     Coronary artery disease Father     Stroke Father         CVA     I have reviewed and agree with the history as documented  E-Cigarette/Vaping    E-Cigarette Use Never User      E-Cigarette/Vaping Substances    Nicotine No     THC No     CBD No     Flavoring No     Other No     Unknown No      Social History     Tobacco Use    Smoking status: Current Every Day Smoker     Packs/day: 0 50     Types: Cigarettes    Smokeless tobacco: Never Used   Vaping Use    Vaping Use: Never used   Substance Use Topics    Alcohol use: Never     Comment: wine with tea  a couple of times a week     Drug use: No       Review of Systems   Respiratory: Positive for shortness of breath  Cardiovascular: Positive for palpitations  All other systems reviewed and are negative  Physical Exam  Physical Exam  Vitals and nursing note reviewed  Constitutional:       General: She is not in acute distress  Appearance: Normal appearance  She is well-developed  She is not ill-appearing, toxic-appearing or diaphoretic  HENT:      Head: Normocephalic and atraumatic  Mouth/Throat:      Mouth: Mucous membranes are dry  Eyes:      Conjunctiva/sclera: Conjunctivae normal       Pupils: Pupils are equal, round, and reactive to light  Neck:      Vascular: No JVD  Cardiovascular:      Rate and Rhythm: Tachycardia present  Rhythm irregular  Heart sounds: Normal heart sounds  Pulmonary:      Effort: Pulmonary effort is normal  No respiratory distress  Breath sounds: No stridor  Rhonchi present  No wheezing or rales  Chest:      Chest wall: No tenderness  Abdominal:      General: There is no distension  Palpations: Abdomen is soft  Tenderness: There is no abdominal tenderness  There is no guarding or rebound  Musculoskeletal:         General: No swelling, tenderness, deformity or signs of injury  Normal range of motion  Cervical back: Normal range of motion and neck supple  Right lower leg: No edema  Left lower leg: No edema  Skin:     General: Skin is warm and dry  Capillary Refill: Capillary refill takes less than 2 seconds  Coloration: Skin is not jaundiced or pale  Findings: No bruising, erythema, lesion or rash  Neurological:      General: No focal deficit present  Mental Status: She is alert and oriented to person, place, and time  Cranial Nerves: No cranial nerve deficit  Sensory: No sensory deficit  Motor: No weakness or abnormal muscle tone        Coordination: Coordination normal       Gait: Gait normal          Vital Signs  ED Triage Vitals [01/19/22 1639]   Temperature Pulse Respirations Blood Pressure SpO2   98 7 °F (37 1 °C) (!) 153 18 (!) 172/67 98 %      Temp Source Heart Rate Source Patient Position - Orthostatic VS BP Location FiO2 (%)   Oral Monitor Sitting Left arm --      Pain Score       --           Vitals:    01/19/22 1639 01/19/22 1815 01/19/22 1915 01/19/22 2115   BP: (!) 172/67 140/72 127/56 112/53   Pulse: (!) 153 (!) 154 (!) 139 (!) 120   Patient Position - Orthostatic VS: Sitting Lying Lying Lying         Visual Acuity      ED Medications  Medications   diltiazem (CARDIZEM) tablet 30 mg (has no administration in time range)   sodium chloride 0 9 % bolus 500 mL (500 mL Intravenous New Bag 1/19/22 1832)   enoxaparin (LOVENOX) subcutaneous injection 50 mg (50 mg Subcutaneous Given 1/19/22 1917)   diltiazem (CARDIZEM) injection 15 mg (15 mg Intravenous Given 1/19/22 1916)   iohexol (OMNIPAQUE) 350 MG/ML injection (SINGLE-DOSE) 100 mL (100 mL Intravenous Given 1/19/22 1900)       Diagnostic Studies  Results Reviewed     Procedure Component Value Units Date/Time    Urine culture [[id number]]     Lab Status: No result Specimen: Urine, Clean Catch     UA (URINE) with reflex to Scope [[id number]]     Lab Status: No result Specimen: Urine     COVID/FLU/RSV - 2 hour TAT [[id number]]  (Normal) Collected: 01/19/22 1820    Lab Status: Final result Specimen: Nares from Nose Updated: 01/19/22 1905     SARS-CoV-2 Negative     INFLUENZA A PCR Negative     INFLUENZA B PCR Negative     RSV PCR Negative    Narrative:      FOR PEDIATRIC PATIENTS - copy/paste COVID Guidelines URL to browser: [email or web address]  [employer]    SARS-CoV-2 assay is a Nucleic Acid Amplification assay intended for the  qualitative detection of nucleic acid from SARS-CoV-2 in nasopharyngeal  swabs  Results are for the presumptive identification of SARS-CoV-2 RNA  Positive results are indicative of infection with SARS-CoV-2, the virus  causing COVID-19, but do not rule out bacterial infection or co-infection  with other viruses  Laboratories within the [place] and its  territories are required to report all positive results to the appropriate  public health authorities  Negative results do not preclude SARS-CoV-2  infection and should not be used as the sole basis for treatment or other  patient management decisions   Negative results must be combined with  clinical observations, patient history, and epidemiological information  This test has not been FDA cleared or approved  This test has been authorized by FDA under an Emergency Use Authorization  (EUA)  This test is only authorized for the duration of time the  declaration that circumstances exist justifying the authorization of the  emergency use of an in vitro diagnostic tests for detection of SARS-CoV-2  virus and/or diagnosis of COVID-19 infection under section 564(b)(1) of  the Act, 21 U  S C  499YYA-3(S)(1), unless the authorization is terminated  or revoked sooner  The test has been validated but independent review by FDA  and CLIA is pending  Test performed using [employer]: This RT-PCR assay targets N2,  a region unique to SARS-CoV-2  A conserved region in the E-gene was chosen  for pan-Sarbecovirus detection which includes SARS-CoV-2      HS Troponin 0hr (reflex protocol) [[id number]]  (Normal) Collected: 01/19/22 1820    Lab Status: Final result Specimen: Blood from Arm, Left Updated: 01/19/22 1850     hs TnI 0hr 9 ng/L     NT-BNP PRO [[id number]]  (Abnormal) Collected: 01/19/22 1820    Lab Status: Final result Specimen: Blood from Arm, Left Updated: 01/19/22 1847     NT-proBNP 1,240 pg/mL     Hepatic function panel [[id number]]  (Abnormal) Collected: 01/19/22 1820    Lab Status: Final result Specimen: Blood from Arm, Left Updated: 01/19/22 1847     Total Bilirubin 1 63 mg/dL      Bilirubin, Direct 0 32 mg/dL      Alkaline Phosphatase 135 U/L      AST 28 U/L      ALT 37 U/L      Total Protein 7 1 g/dL      Albumin 3 9 g/dL     Basic metabolic panel [[id number]] Collected: 01/19/22 1820    Lab Status: Final result Specimen: Blood from Arm, Left Updated: 01/19/22 1845     Sodium 140 mmol/L      Potassium 3 9 mmol/L      Chloride 104 mmol/L      CO2 24 mmol/L      ANION GAP 12 mmol/L      BUN 12 mg/dL      Creatinine 0 78 mg/dL      Glucose 119 mg/dL      Calcium 8 9 mg/dL      eGFR 72 ml/min/1 73sq m     Narrative:      [place] guidelines for Chronic Kidney Disease (CKD):     Stage 1 with normal or high GFR (GFR > 90 mL/min/1 73 square meters)    Stage 2 Mild CKD (GFR = 60-89 mL/min/1 73 square meters)    Stage 3A Moderate CKD (GFR = 45-59 mL/min/1 73 square meters)    Stage 3B Moderate CKD (GFR = 30-44 mL/min/1 73 square meters)    Stage 4 Severe CKD (GFR = 15-29 mL/min/1 73 square meters)    Stage 5 End Stage CKD (GFR <15 mL/min/1 73 square meters)  Note: GFR calculation is accurate only with a steady state creatinine    Protime-INR [[id number]]  (Normal) Collected: 01/19/22 1820    Lab Status: Final result Specimen: Blood from Arm, Left Updated: 01/19/22 1836     Protime 13 6 seconds      INR 1 09    APTT [[id number]]  (Normal) Collected: 01/19/22 1820    Lab Status: Final result Specimen: Blood from Arm, Left Updated: 01/19/22 1836     PTT 29 seconds     CBC and differential [[id number]]  (Abnormal) Collected: 01/19/22 1820    Lab Status: Final result Specimen: Blood from Arm, Left Updated: 01/19/22 1826     WBC 10 90 Thousand/uL      RBC 3 90 Million/uL      Hemoglobin 12 8 g/dL      Hematocrit 39 8 %       fL      MCH 32 8 pg      MCHC 32 2 g/dL      RDW 13 5 %      MPV 9 8 fL      Platelets 811 Thousands/uL      nRBC 0 /100 WBCs      Neutrophils Relative 83 %      Immat GRANS % 0 %      Lymphocytes Relative 12 %      Monocytes Relative 2 %      Eosinophils Relative 2 %      Basophils Relative 1 %      Neutrophils Absolute 9 05 Thousands/µL      Immature Grans Absolute 0 04 Thousand/uL      Lymphocytes Absolute 1 27 Thousands/µL      Monocytes Absolute 0 23 Thousand/µL      Eosinophils Absolute 0 26 Thousand/µL      Basophils Absolute 0 05 Thousands/µL                  CTA ED chest PE study   Final Result by Ashley Lynch MD (01/19 2133)      No pulmonary embolism  Partially visualized perihepatic ascites        Workstation performed: [id number]         XR chest 1 view portable    (Results Pending) Procedures  ECG 12 Lead Documentation Only    Date/Time: 1/19/2022 6:45 PM  Performed by: Yulia Olvera MD  Authorized by: Yulia Olvera MD     Indications / Diagnosis:  Dyspnea  ECG reviewed by me, the ED Provider: yes    Patient location:  ED  Previous ECG:     Previous ECG:  Unavailable  Interpretation:     Interpretation: abnormal    Rate:     ECG rate:  159    ECG rate assessment: tachycardic    Rhythm:     Rhythm: atrial fibrillation    Comments:      Atrial flutter  Nonspecific st changes  CriticalCare Time  Performed by: Yulia Olvera MD  Authorized by: Yulia Olvera MD     Critical care provider statement:     Critical care time (minutes):  35    Critical care time was exclusive of:  Separately billable procedures and treating other patients  Comments:      Treatment of tachyarrhythmia with parenteral rate control agent              ED Course  ED Course as of 01/19/22 2136 Wed Jan 19, 2022 2106 Reexamined  Refuses admission  Wishes to leave AMA when CTPE completed  Son at bedside supports her decision  She is fully aware of risks of of leaving AMA including death, permanent disability, cardiac arrest, etc  She clearly demonstrates capacity to provide informed refusal      2127 Pt now states she is agreeable to admission  SBIRT 22yo+      Most Recent Value   SBIRT (24 yo +)    In order to provide better care to our patients, we are screening all of our patients for alcohol and drug use  Would it be okay to ask you these screening questions? Yes Filed at: 01/19/2022 1824   Initial Alcohol Screen: US AUDIT-C     1  How often do you have a drink containing alcohol? 0 Filed at: 01/19/2022 1824   2  How many drinks containing alcohol do you have on a typical day you are drinking? 0 Filed at: 01/19/2022 1824   3a  Male UNDER 65: How often do you have five or more drinks on one occasion? 0 Filed at: 01/19/2022 1824   3b  FEMALE Any Age, or MALE 65+:  How often do you have 4 or more drinks on one occassion? 0 Filed at: 01/19/2022 1824   Audit-C Score 0 Filed at: 01/19/2022 1824   BEBA: How many times in the past year have you    Used an illegal drug or used a prescription medication for non-medical reasons? Never Filed at: 01/19/2022 8450          Wells' Criteria for PE      Most Recent Value   Wells' Criteria for PE    Clinical signs and symptoms of DVT 0 Filed at: 01/19/2022 1836   PE is primary diagnosis or equally likely 3 Filed at: 01/19/2022 1836   HR >100 1 5 Filed at: 01/19/2022 1836   Immobilization at least 3 days or Surgery in the previous 4 weeks 0 Filed at: 01/19/2022 1836   Previous, objectively diagnosed PE or DVT 0 Filed at: 01/19/2022 1836   Hemoptysis 0 Filed at: 01/19/2022 1836   Malignancy with treatment within 6 months or palliative 1 Filed at: 01/19/2022 1836   Wells' Criteria Total 5 5 Filed at: 01/19/2022 1836                MDM    Disposition  Final diagnoses:   Atrial fibrillation ([facility] [address] ) - new onset, with RVR   Pancreatic cancer ([facility] [address] )     Time reflects when diagnosis was documented in both MDM as applicable and the Disposition within this note     Time User Action Codes Description Comment    1/19/2022  9:08 PM Gianna Aponte [I48 [id number]] Atrial fibrillation ([facility] [address] )     1/19/2022  9:08 PM Elisha Valenzuela [I48 [id number]] Atrial fibrillation ([facility] [address] ) new onset, with RVR    1/19/2022  9:08 PM Isaias Galarza [C25 [id number]] Pancreatic cancer ([facility] [address] )     1/19/2022 10:15 PM Maral Wilde Add [I48 91] Atrial fibrillation with RVR ([facility] [address] )     1/19/2022 10:25 PM Maral Wilde Modify [I48 91] Atrial fibrillation with RVR [facility])       ED Disposition     ED Disposition Condition Date/Time Comment    Admit Stable Wed Jan 19, 2022 10:32 PM Case was discussed with ZHANG and the patient's admission status was agreed to be Admission Status: observation status to the service of Dr Mikie Dougherty           Follow-up Information    None         Patient's Medications   Discharge Prescriptions    No medications on file       No discharge procedures on file      PDMP Review       Value Time User    PDMP Reviewed  Yes 1/19/2022 12:08 PM Citlali Monk MD          ED Provider  Electronically Signed by           Smith Marino MD  01/20/22 0649

## 2022-01-19 NOTE — PROGRESS NOTES
Outpatient Follow-Up - Palliative and Supportive Care   Rene Abel 80 y o  female [id number]    Assessment & Plan  Problem List Items Addressed This Visit        Digestive    Pancreatic adenocarcinoma [facility]) - Primary       Other    Anxiety    Mild protein-calorie malnutrition ([facility] [address] )    Medical marijuana use    Cancer related pain    Palliative care patient        #symptom management  #tachycardic in office today   - initial , repeat after 45 minutes    - /80   - POX 97%, reported dyspnea   - reported palpitations + lightheadedness   - denies CP, nausea, vomiting, LOC   - Wells' moderate risk [tachy, malignancy], no prior h/o VTE   - no reported cardiac hx, no EKG on file, no known h/o AF   - no prior h/o UTI, PNA, T 99 3 [temporal] @ visit today   - possible proctitis on recent CT A/P on 01/18/2022    Patient attributes to anxiety/dehydration, also with increased urinary frequency with minimal PO liquid intake  Discussed recommendation for patient to go to ED for further evaluation/management  Patient adamantly refused and wishes to go home for now and increase fluid intake  Discussed DDx for tachycardia + dyspnea, including PE, cardiac dysrhythmia, symptomatic anemia, infectious etiology, electrolyte derangement, hyperthyroidism; potential for further deterioration with possibility of worsening underlying condition v death  Patient/son shared understanding of risks and patient still wishes to go home  Patient with decisional competency intact  Hammad Chaudhry RN will contact patient this afternoon for a check-in  Patient states that if symptoms persist or worsen, will go to the ED for further evaluation/management      #cancer-related pain  #chronic pain   - continue oxy-IR 10-20 mg PO Q4H PRN     #OIC   - continue miralax 17 g PO QDaily PRN   - continue senna 1 tab PO QDaily PRN     #nausea   - continue ondansetron 4 mg PO Q8H PRN [first line]   - continue prochlorperazine 10 mg PO Q6H PRN [second line]     #depression   - continue escitalopram 20 mg PO QDaily     #MMJ   - certified with MMJ card    #goals of care   - see above discussing today's acute medical concern    #psychosocial support   - emotional support provided   - ,  1 year ago   - one adult son [Bill]      Next [facility] Follow up in 4 weeks  Controlled Substance Review    [place] or [place]  reviewed: No red flags were identified; safe to proceed with prescription  Diya Craig PDMP Review       Value Time User    PDMP Reviewed  Yes (P)  2022  7:43 AM Tru Laguna MD          Medications adjusted this encounter:  Requested Prescriptions      No prescriptions requested or ordered in this encounter     No orders of the defined types were placed in this encounter  There are no discontinued medications  Abel Laguerre was seen today for symptoms and planning cares related to above illnesses  I have reviewed the patient's controlled substance dispensing history in the Prescription Drug Monitoring Program in compliance with the [facility] regulations before prescribing any controlled substances  They are invited to continue to follow with us  If there are questions or concerns, please contact us through our clinic/answering service 24 hours a day, seven days a week  Tru Laguna MD  [facility] Palliative and Supportive Care        Visit Information    Accompanied By: Mary Lee    Source of History: Self, Family member, Medical record    History Limitations: None      History of Present Illness    Abel Laguerre is a 80 y o  female who presents in follow up of symptoms related to pancreatic adenocarcinoma  Pertinent issues include: symptom management, pain, neoplasm related, depression or anxiety, assessment of goals of care, advance care planning  Patient with initial  [repeat 45 minutes later ] with /80, POX 97% on RA   Patient reports palpitations, mild dyspnea at rest, lightheadedness  Symptoms started prior to arrival to [facility] appointment  Usual amount of caffeine consumed in AM  No prior h/o tachycardia in the past  No reported cardiac hx  Denies CP, diaphoresis, nausea, vomiting  No LOC  No prior h/o VTE  No unilateral leg swelling  No prior h/o AF  Denies hematochezia, melena  Denies fever/chills  No prior h/o UTI  Denies dysuria, hematuria  Increased urinary frequency over the past few months with minimal PO intake of water  No prior h/o PNA  Persistent cough with phlegm, chronic in nature  Denies neck pain/stiffness  Mild HA, chronic  No known h/o thyroid disease  Possible proctitis on recent CT A/P on 01/18/2022  Pain adequately managed with current regimen  Use of oxy-IR 4-5 tabs/day, onset within 15 minutes, lasting 3-4 hours  Appetite recently improved with 5 lb weight gain over the past 1 week  BM every 1-4 days  Fragmented sleep  Past medical, surgical, social, and family histories are reviewed and pertinent updates are made  Review of Systems   Constitutional: Positive for malaise/fatigue and weight gain  Negative for chills, decreased appetite, diaphoresis, fever and weight loss  HENT: Positive for congestion  Eyes: Negative for visual disturbance  Cardiovascular: Positive for dyspnea on exertion, near-syncope and palpitations  Negative for chest pain, leg swelling and syncope  Respiratory: Positive for cough and shortness of breath  Negative for hemoptysis  Musculoskeletal: Negative for falls and neck pain  Gastrointestinal: Positive for abdominal pain and constipation  Negative for hematochezia, melena, nausea and vomiting  Genitourinary: Negative for frequency  Neurological: Positive for headaches  Psychiatric/Behavioral: The patient does not have insomnia  All other systems reviewed and are negative          Vital Signs    /80 (BP Location: Left arm, Patient Position: Sitting, Cuff Size: Standard)   Pulse (!) 142   Temp 99 3 °F (37 4 °C) (Temporal)   Resp 20   Wt 50 3 kg (111 lb)   SpO2 97%   BMI 17 92 kg/m²     Physical Exam and Objective Data  Physical Exam  Vitals and nursing note reviewed  Constitutional:       General: She is awake  Appearance: She is not diaphoretic  Comments: Chronically ill appearing in NAD  BMI 17 7  Non-toxic appearing   HENT:      Head: Normocephalic and atraumatic  Right Ear: External ear normal       Left Ear: External ear normal       Nose: No rhinorrhea  Eyes:      Comments: No gaze preference   Cardiovascular:      Rate and Rhythm: Tachycardia present  Pulses:           Radial pulses are 2+ on the right side and 2+ on the left side  Pulmonary:      Effort: Tachypnea present  No accessory muscle usage or respiratory distress  Comments: Mild SOB during prolonged conversation  Musculoskeletal:      Cervical back: Normal range of motion  Neurological:      General: No focal deficit present  Mental Status: She is alert and oriented to person, place, and time     Psychiatric:         Attention and Perception: Attention normal          Mood and Affect: Mood and affect normal          Speech: Speech normal          Cognition and Memory: Cognition and memory normal            Radiology and Laboratory:  I personally reviewed and interpreted the following results:    Most Recent COVID-19 Results:  No results found for: Gris Coburn, [facility]    Most Recent Lab Work:  Lab Results   Component Value Date/Time    SODIUM 140 01/14/2022 11:20 AM    K 3 5 01/14/2022 11:20 AM    BUN 13 01/14/2022 11:20 AM    CREATININE 0 76 01/14/2022 11:20 AM    GLUC 156 (H) 01/14/2022 11:20 AM     Lab Results   Component Value Date/Time    AST 15 01/14/2022 11:20 AM    ALT 34 01/14/2022 11:20 AM    ALB 3 3 (L) 01/14/2022 11:20 AM     Lab Results   Component Value Date/Time    HGB 10 7 (L) 01/14/2022 11:20 AM    WBC 5 91 01/14/2022 11:20 AM     01/14/2022 11:20 AM       Most Recent Imaging [last 30 days]:  No results found  50 minutes was spent face to face with Katherine Yeager and her son with greater than 50% of the time spent in counseling or coordination of care including discussions of provided medical updates, discussed palliative care, determined goals of care, determined social/family support, discussed plans of care, discussed symptom management, provided psychosocial support  ED recommendation for persistent symptomatic tachycardia  PDMP Reviewed  All of the patient's or agent's questions were answered during this discussion

## 2022-01-20 NOTE — CASE MANAGEMENT
Case Management Assessment & Discharge Planning Note    Patient name Eris Staley  Location FT  MRN [id number]  : 1940 Date 2022       Current Admission Date: 2022  Current Admission Diagnosis:Atrial fibrillation with RVR [facility])   Patient Active Problem List    Diagnosis Date Noted    Cancer related pain 2022    Palliative care patient 2022    Atrial fibrillation with RVR ([facility] [address] ) 2022    Elevated brain natriuretic peptide (BNP) level 2022    Medical marijuana use 2021    Port-A-Cath in place 2021    Pancreatic adenocarcinoma ([facility] [address] ) 10/25/2021    Encounter for chemotherapy management 10/25/2021    Mild protein-calorie malnutrition ([facility] ) 10/25/2021    At high risk for infection due to chemotherapy 10/25/2021    Anxiety 10/13/2021    Spinal stenosis 10/13/2021    Smoking 10/13/2021    Weight loss 2021    Anemia 2021    Drug-induced constipation 2021    Reactive depression 2021    Osteoporosis 2018    Impaired fasting glucose 2018    Essential hypertension 2018    Tobacco use 2018    [facility] 2015    Allergic rhinitis 2015    DDD (degenerative disc disease), lumbar 2014    Mixed hyperlipidemia 2014      LOS (days): 1  Geometric Mean LOS (GMLOS) (days):   Days to GMLOS:     OBJECTIVE:    Risk of Unplanned Readmission Score: 12   Bundled Patient Payment:  (The pt is not a Bundle)     Current admission status: Inpatient       Preferred Pharmacy:   [address] [place] [address] [address]  [address] [facility] [address]  Phone: [phone] Fax: [phone]    [id number] [place] - [address] [facility] [address]  Phone: [phone] Fax: [phone]    [address] [place] [address]  Phone: [phone] Fax: [phone]    Primary Care Provider: Mal Reddy MD    Primary Insurance: MEDICARE  Secondary Insurance: AARP    ASSESSMENT:  Active Health Care Agents    There are no active Health Care Agents on file  Obs Notice Signed:  (The pt is not OBS)    Readmission Root Cause  30 Day Readmission: No (The pt is not a 30 day re-admit)    Patient Information  Admitted from[de-identified] Home  Mental Status: Alert  During Assessment patient was accompanied by: Son (son, Kenya Evans)  Assessment information provided by[de-identified] Patient,Son  Primary Caregiver: Family  Caregiver's Name[de-identified] Kenya Evans- son  Grace Byers Relationship to Patient[de-identified] Family Member  Caregiver's Telephone Number[de-identified] [phone]  Support Systems: [employer] [place] of Residence: [place] [address] do you live in?: [address] entry access options   Select all that apply : No steps to enter home  Type of Current Residence: 2 story home  Upon entering residence, is there a bathroom on the main floor (no further steps)?: No  Indicate which floors of current residence have a bathroom (select all the apply):: Basement  Number of steps to basement from main floor: One Flight  In the last 12 months, was there a time when you were not able to pay the mortgage or rent on time?: No  In the last 12 months, how many places have you lived?: 1  In the last 12 months, was there a time when you did not have a steady place to sleep or slept in a shelter (including now)?: No  Homeless/housing insecurity resource given?: N/A  Living Arrangements: Lives w/ Son  Is patient a ?: No    Activities of Daily Living Prior to Admission  Functional Status: Independent  Completes ADLs independently?: Yes  Ambulates independently?: Yes  Does patient use assisted devices?: No  Does patient currently own DME?: Yes  What DME does the patient currently own?: Kade Landon  Does patient have a history of Outpatient Therapy (PT/OT)?: No  Does the patient have a history of Short-Term Rehab?: No  Does patient have a history of HHC?: No  Does patient currently have Ana Olivier?: No         Patient Information Continued  Income Source: Pension/long-term  Does patient have prescription coverage?: Yes  Within the past 12 months, you worried that your food would run out before you got the money to buy more : Never true  Within the past 12 months, the food you bought just didnt last and you didnt have money to get more : Never true  Food insecurity resource given?: N/A  Does patient receive dialysis treatments?: No  Does patient have a history of substance abuse?: No  Does patient have a history of Mental Health Diagnosis?: No    PHQ 2/9 Screening   Reviewed PHQ 2/9 Depression Screening Score?: Yes    Means of Transportation  Means of Transport to Appts[de-identified] Family transport (Pt is able to drive but states that she does not drive due to her medications)  In the past 12 months, has lack of transportation kept you from medical appointments or from getting medications?: No  In the past 12 months, has lack of transportation kept you from meetings, work, or from getting things needed for daily living?: No  Was application for public transport provided?: N/A        DISCHARGE DETAILS:    Discharge planning discussed with[de-identified] pt and her son Moe Julio of Choice: Yes  Comments - Freedom of Choice: The pt and he son state that the pt does not need any HHC at this time  The pt has a neighbor who is a retired RN that provides her with assistance as needed  Pt states that if she is not able to make medical decisions that she would like her son Franc Villegas to make the decisions    CM contacted family/caregiver?: Yes  Were Treatment Team discharge recommendations reviewed with patient/caregiver?: Yes  Did patient/caregiver verbalize understanding of patient care needs?: Yes  Were patient/caregiver advised of the risks associated with not following Treatment Team discharge recommendations?: Yes    Contacts  Patient Contacts: Kenya Evans- son  Relationship to Patient[de-identified] Family  Contact Method: Phone  Phone Number: [phone]  Reason/Outcome: Discharge [address]         Is the patient interested in [place] [address] at discharge?: No    DME Referral Provided  Referral made for DME?: No              Treatment Team Recommendation: Home  Discharge Destination Plan[de-identified] Home  Transport at Discharge : Family (The pt and son indicated that he will transport the pt home at dc)

## 2022-01-20 NOTE — ASSESSMENT & PLAN NOTE
· Follow with Oncology oupatient with Dr Roger Gaitan  · Alline Outlaw Gemcitabine/Abraxane treatments  · Continue oxycodone 10mg Q4hr prn for moderate pain as prescribed by Palliative Care

## 2022-01-20 NOTE — ASSESSMENT & PLAN NOTE
· Reported rapid heart rate at OP office vist with associated dizziness and SOB  · New onset afib, unknown etiology  · CTA negative for PE  · ECG: Atrial fibrillation with HR 159bpm  · Given Cardizem 15mg IV x1 and 30mg PO x 1 in the ED   Following this rate still ranging 130-150s  · Start on IV Cardizem drip  · Add metoprolol tartrate 25mg BID  · Therapeutic lovenox dosing 1mg/kg started in ED, will continue for [facility] [place]  · Monitor on telemetry  · NPO after midnight for potential cardioversion  · Consult to Cardiology, recommendations are appreciated

## 2022-01-20 NOTE — PHYSICAL THERAPY NOTE
Physical Therapy Cancellation Note    PT order received  Chart review performed  At this time, PT evaluation cancelled secondary to unstable medical status/elevation in HR at rest, awaiting cardiology consult  PT will follow and evaluate as appropriate       01/20/22 0915   PT Last Visit   PT Visit Date 01/20/22   Note Type   Note type Cancelled Session   Cancel Reasons Medical status       Nan Maza, PT, DPT

## 2022-01-20 NOTE — ED NOTES
Awaiting pumps from hospital supervisor for medication administration through lesa Melton RN  01/19/22 5641

## 2022-01-20 NOTE — PROGRESS NOTES
Pt started on diltiazem gtt at 2 5mg for better rate control  Next blood pressure was 85/54, gtt turned off and Dr Val Franklin with Cardiology made aware

## 2022-01-20 NOTE — ASSESSMENT & PLAN NOTE
· Soft pressures on admission with /55  · Taking losartan 50mg daily at home, will hold for now  · Adding Metoprolol 25mg BID due to new onset Afib  · Currently on Cardizem drip for rate control  · Start on gentle IV fluid 75ml/hr overnight  · Monitor BP closely

## 2022-01-20 NOTE — H&P
[facility] 1940, 80 y o  female MRN: [id number]  Unit/Bed#: FT 03 Encounter: [id number]  Primary Care Provider: Eulogio Sharma MD   Date and time admitted to hospital: 1/19/2022  5:55 PM    * Atrial fibrillation with RVR ([address] )  Assessment & Plan  · Reported rapid heart rate at OP office vist with associated dizziness and SOB  · New onset afib, unknown etiology  · CTA negative for PE  · ECG: Atrial fibrillation with HR 159bpm  · Given Cardizem 15mg IV x1 and 30mg PO x 1 in the ED   Following this rate still ranging 130-150s  · Start on IV Cardizem drip  · Add metoprolol tartrate 25mg BID  · Therapeutic lovenox dosing 1mg/kg started in ED, will continue for [facility]  · Monitor on telemetry  · NPO after midnight for potential cardioversion  · Consult to Cardiology, recommendations are appreciated    Essential hypertension  Assessment & Plan  · Soft pressures on admission with /55  · Taking losartan 50mg daily at home, will hold for now  · Adding Metoprolol 25mg BID due to new onset Afib  · Currently on Cardizem drip for rate control  · Start on gentle IV fluid 75ml/hr overnight  · Monitor BP closely    Elevated brain natriuretic peptide (BNP) level  Assessment & Plan  · Elevated BNP 1,240  · Likely secondary to new onset afib, patient is dry on exam  · Obtain ECHO  · Continue to monitor    Pancreatic adenocarcinoma [facility])  Assessment & Plan  · Follow with Oncology oupatient with Dr Gayatri Ramírez  · Racheal Solo Gemcitabine/Abraxane treatments  · Continue oxycodone 10mg Q4hr prn for moderate pain as prescribed by Palliative Care    Spinal stenosis  Assessment & Plan  · Pain control  · PT/OT    Tobacco use  Assessment & Plan  · Current everyday smoker  · Declines nicotine supplementation   · Encouraged cessation    Mixed hyperlipidemia  Assessment & Plan  · Continue Lipitor 20mg daily    VTE Prophylaxis: Enoxaparin (Lovenox)  / sequential compression device   Code Status: Level 1 code  Discussion with family: All patient questions answered to the best of my ability    Anticipated Length of Stay:  Patient will be admitted on an Inpatient basis with an anticipated length of stay of  More than 2 midnights  Justification for Hospital Stay: Atrial fibrillation with RVR    Total Time for Visit, including Counseling / Coordination of Care: 60 minutes  Greater than 50% of this total time spent on direct patient counseling and coordination of care  Chief Complaint:   Rapid heart rate    History of Present Illness:    Katherine Yeager is a 80 y o  female who has past medical history significant for hypertension, hyperlipidemia, spinal stenosis, pancreatic adenocarcinoma  She presents from her outpatient office for rapid heart rate and shortness of breath  Patient was found to be in atrial fibrillation with rapid ventricular response on ECG  Patient does not have history of atrial fibrillation  Reports associated lightheadedness, dizziness, palpitations but denies any chest pain at this time  Admission to medical service for further treatment evaluation of new onset atrial fibrillation with RVR  Review of Systems:    Review of Systems   Constitutional: Negative for chills and fever  HENT: Negative for congestion, ear pain, rhinorrhea and sore throat  Eyes: Negative for pain and visual disturbance  Respiratory: Positive for shortness of breath  Negative for cough  Cardiovascular: Positive for palpitations  Negative for chest pain  Gastrointestinal: Negative for abdominal pain, constipation, diarrhea, nausea and vomiting  Genitourinary: Negative for dysuria and hematuria  Musculoskeletal: Negative for arthralgias and back pain  Skin: Negative for color change and rash  Neurological: Positive for dizziness and light-headedness  Negative for seizures and syncope  All other systems reviewed and are negative        Past Medical and Surgical History:     Past Medical History: Diagnosis Date    Anxiety     Chronic [address]itis [address] [address]     Depression     Deviated nasal septum     H/O colonoscopy     6/7/10    Hyperlipidemia     Hypertension     Impaired fasting glucose     Spinal stenosis        Past Surgical History:   Procedure Laterality Date    APPENDECTOMY      CHOLECYSTECTOMY      EXPLORATORY LAPAROTOMY      IR PORT PLACEMENT  11/2/2021    TUBAL LIGATION         Meds/Allergies:    Prior to Admission medications    Medication Sig Start Date End Date Taking?  Authorizing Provider   atorvastatin (LIPITOR) 20 mg tablet TAKE 1 TABLET EVERY DAY 5/11/21   David Adams MD   escitalopram (LEXAPRO) 20 mg tablet Take 1 tablet (20 mg total) by mouth daily  Patient not taking: Reported on 11/9/2021 8/9/21   David Adams MD   fluticasone (FLONASE) 50 mcg/act nasal spray 2 sprays into each nostril daily    Historical Provider, MD    MG tablet TAKE 1 TABLET (600 MG TOTAL) BY MOUTH 3 (THREE) TIMES A DAY  Patient not taking: Reported on 12/27/2021 11/10/20   David Adams MD   losartan (COZAAR) 50 mg tablet TAKE 1 TABLET (50 MG TOTAL) BY MOUTH DAILY 5/11/21   David Adams MD   lubiprostone (AMITIZA) 24 mcg capsule Take 1 capsule (24 mcg total) by mouth 2 (two) times a day with meals for 14 days 12/27/21 1/10/22  hWit Jaramillo MD PhD   ondansetron (ZOFRAN) 4 mg tablet Take 1 tablet (4 mg total) by mouth every 8 (eight) hours as needed for nausea or vomiting 12/7/21   Kang Loving MD   oxyCODONE (ROXICODONE) 20 MG TABS Take 0 5-1 tablets (10-20 mg total) by mouth every 4 (four) hours as needed for moderate pain Max Daily Amount: 120 mg 1/11/22   Kang Loving MD   pancrelipase, Lip-Prot-Amyl, (CREON) 12,000 units capsule Take 12,000 units of lipase by mouth 3 (three) times a day with meals 4/6/21   David Adams MD   polyethylene glycol (MIRALAX) 17 g packet Take 17 g by mouth daily as needed (Constipation) 11/10/21   Kang Loving MD   prochlorperazine (COMPAZINE) 10 mg tablet Take 1 tablet (10 mg total) by mouth every 6 (six) hours as needed for nausea or vomiting 12/7/21   Radha Aguila MD   senna (SENOKOT) 8 6 MG tablet Take 1 tablet (8 6 mg total) by mouth daily 12/7/21   Radha Aguila MD     I have reviewed home medications using allscripts  Allergies: Allergies   Allergen Reactions    Morphine Abdominal Pain       Social History:     Marital Status:    Occupation: NA  Patient Pre-hospital Living Situation: Home  Patient Pre-hospital Level of Mobility: Independent  Patient Pre-hospital Diet Restrictions: None  Substance Use History:   Social History     Substance and Sexual Activity   Alcohol Use Never    Comment: wine with tea  a couple of times a week      Social History     Tobacco Use   Smoking Status Current Every Day Smoker    Packs/day: 0 50    Types: Cigarettes   Smokeless Tobacco Never Used     Social History     Substance and Sexual Activity   Drug Use No       Family History:    Family History   Problem Relation Age of Onset    Multiple myeloma Mother     Coronary artery disease Father     Stroke Father         CVA       Physical Exam:     Vitals:   Blood Pressure: 106/55 (01/19/22 2230)  Pulse: (!) 128 (01/19/22 2230)  Temperature: 98 7 °F (37 1 °C) (01/19/22 1639)  Temp Source: Oral (01/19/22 1639)  Respirations: 19 (01/19/22 2230)  Height: 5' 6" (167 6 cm) (01/19/22 1639)  Weight - Scale: 49 9 kg (110 lb) (01/19/22 1639)  SpO2: 95 % (01/19/22 2230)    Physical Exam  Vitals and nursing note reviewed  Constitutional:       General: She is not in acute distress  Appearance: She is well-developed  HENT:      Head: Normocephalic and atraumatic  Nose: No congestion or rhinorrhea  Mouth/Throat:      Mouth: Mucous membranes are moist       Pharynx: Oropharynx is clear  Eyes:      General: No scleral icterus  Conjunctiva/sclera: Conjunctivae normal       Pupils: Pupils are equal, round, and reactive to light  Cardiovascular:      Rate and Rhythm: Tachycardia present  Rhythm irregular  Heart sounds: No murmur heard  Pulmonary:      Effort: Pulmonary effort is normal  No respiratory distress  Breath sounds: Normal breath sounds  Abdominal:      General: Bowel sounds are normal  There is no distension  Palpations: Abdomen is soft  Tenderness: There is no abdominal tenderness  Musculoskeletal:         General: Normal range of motion  Cervical back: Neck supple  Right lower leg: No edema  Left lower leg: No edema  Skin:     General: Skin is warm and dry  Neurological:      Mental Status: She is alert and oriented to person, place, and time  Additional Data:     Lab Results: I have personally reviewed pertinent reports  Results from last 7 days   Lab Units 01/19/22  1820   WBC Thousand/uL 10 90*   HEMOGLOBIN g/dL 12 8   HEMATOCRIT % 39 8   PLATELETS Thousands/uL 274   NEUTROS PCT % 83*   LYMPHS PCT % 12*   MONOS PCT % 2*   EOS PCT % 2     Results from last 7 days   Lab Units 01/19/22  1820   SODIUM mmol/L 140   POTASSIUM mmol/L 3 9   CHLORIDE mmol/L 104   CO2 mmol/L 24   BUN mg/dL 12   CREATININE mg/dL 0 78   ANION GAP mmol/L 12   CALCIUM mg/dL 8 9   ALBUMIN g/dL 3 9   TOTAL BILIRUBIN mg/dL 1 63*   ALK PHOS U/L 135*   ALT U/L 37   AST U/L 28   GLUCOSE RANDOM mg/dL 119     Results from last 7 days   Lab Units 01/19/22  1820   INR  1 09                   Imaging: I have personally reviewed pertinent reports  CTA ED chest PE study   Final Result by Kobe Villegas MD (01/19 2133)      No pulmonary embolism  Partially visualized perihepatic ascites        Workstation performed: [id number]         XR chest 1 view portable    (Results Pending)       EKG, Pathology, and Other Studies Reviewed on Admission:   · EKG: Reviewed  · CTA: Reviewed    Allscripts / Epic Records Reviewed: Yes     ** Please Note: This note has been constructed using a voice recognition system   **

## 2022-01-20 NOTE — PROGRESS NOTES
[address] [facility]  Progress Note - Jose M Peng 1940, 80 y o  female MRN: [id number]  Unit/Bed#: FT 03 Encounter: [id number]  Primary Care Provider: Trinidad Rodrigues MD   Date and time admitted to hospital: 1/19/2022  5:55 PM    * Atrial fibrillation with RVR ([facility] )  Assessment & Plan  - new onset AFib with RVR    - cardiology following  - continue on p o  Cardizem 30 mg q i d   - monitor BP closely  - therapeutic Lovenox for stroke prophylaxis; plan for eventual transition to NOAC on discharge  - continue to monitor on telemetry  - follow-up echocardiogram results    Pancreatic adenocarcinoma ([facility] )  Assessment & Plan  - Follow with Oncology oupatient with Dr Kirti Diamond Gemcitabine/Abraxane treatments  - Continue oxycodone 10mg Q4hr prn for moderate pain as prescribed by Palliative Care    Spinal stenosis  Assessment & Plan  - Pain control  - PT/OT    Tobacco use  Assessment & Plan  - Current everyday smoker  - Declines nicotine supplementation   - Encouraged cessation    Essential hypertension  Assessment & Plan  - hold losartan  - stop metoprolol    - monitor on home cardizem po 30mg QID      Mixed hyperlipidemia  Assessment & Plan  - Continue Lipitor 20mg daily      VTE Pharmacologic Prophylaxis: VTE Score: 5 High Risk (Score >/= 5) - Pharmacological DVT Prophylaxis Ordered: enoxaparin (Lovenox)  Sequential Compression Devices Ordered  Patient Centered Rounds: I performed bedside rounds with nursing staff today  Discussions with Specialists or Other Care Team Provider: Case Management    Education and Discussions with Family / Patient: Updated  (son) at bedside  Time Spent for Care: 20 minutes  More than 50% of total time spent on counseling and coordination of care as described above      Current Length of Stay: 1 day(s)  Current Patient Status: Inpatient   Certification Statement: The patient will continue to require additional inpatient hospital stay due to AFIB with RVR  Discharge Plan: Anticipate discharge in 24-48 hrs to home  Code Status: Level 1 - Full Code    Subjective:   Patient seen and examined  Following up for new onset AFib with RVR  Heart rate better controlled though blood pressure has not been soft  Seen by Cardiology  Spoke with patient and her son at bedside  All questions and concerns addressed  Denies any chest pain or shortness of breath  Objective:     Vitals:   Temp (24hrs), Av 7 °F (37 1 °C), Min:98 7 °F (37 1 °C), Max:98 7 °F (37 1 °C)    Temp:  [98 7 °F (37 1 °C)] 98 7 °F (37 1 °C)  HR:  [] 126  Resp:  [13-19] 19  BP: ()/(48-72) 85/54  SpO2:  [93 %-99 %] 96 %  Body mass index is 17 75 kg/m²  Input and Output Summary (last 24 hours): Intake/Output Summary (Last 24 hours) at 2022 1437  Last data filed at 2022 0249  Gross per 24 hour   Intake 1000 ml   Output --   Net 1000 ml       PHYSICAL EXAM:    Vitals signs reviewed  Constitutional   Awake and cooperative  NAD  Chronically ill-appearing  Head/Neck   Normocephalic  Atraumatic  HEENT   No scleral icterus  EOMI  Heart   Irregularly irregular  No murmers  Lungs   Clear to auscultation bilaterally  Respirations unlaboured  Abdomen   Soft  Nontender  Nondistended  Skin   Skin color pale  No rashes  Extremities   No deformities  No peripheral edema  Neuro   Alert and oriented  No new deficits  Psych   Mood stable  Affect normal          Additional Data:     Labs:  Results from last 7 days   Lab Units 22  0514 22  0422 22  1820   WBC Thousand/uL 6 36   < > 10 90*   HEMOGLOBIN g/dL 10 1*   < > 12 8   HEMATOCRIT % 30 0*   < > 39 8   PLATELETS Thousands/uL 193   < > 274   NEUTROS PCT %  --   --  83*   LYMPHS PCT %  --   --  12*   MONOS PCT %  --   --  2*   EOS PCT %  --   --  2    < > = values in this interval not displayed       Results from last 7 days   Lab Units 22  0422   SODIUM mmol/L 141   POTASSIUM mmol/L 4 0 CHLORIDE mmol/L 110*   CO2 mmol/L 25   BUN mg/dL 11   CREATININE mg/dL 0 62   ANION GAP mmol/L 6   CALCIUM mg/dL 7 5*   ALBUMIN g/dL 2 5*   TOTAL BILIRUBIN mg/dL 1 59*   ALK PHOS U/L 96   ALT U/L 33   AST U/L 22   GLUCOSE RANDOM mg/dL 98     Results from last 7 days   Lab Units 01/19/22  1820   INR  1 09                   Lines/Drains:  Invasive Devices  Report    Central Venous Catheter Line            Port A Cath 11/02/21 Right Chest 79 days          Peripheral Intravenous Line            Peripheral IV 01/19/22 Left Forearm <1 day                Central Line:  Goal for removal: Chronic port         Telemetry:  Telemetry Orders (From admission, onward)             48 Hour Telemetry Monitoring  Continuous x 48 hours        References:    Telemetry Guidelines   Question:  Reason for 48 Hour Telemetry  Answer:  Arrhythmias Requiring Medical Therapy (eg  SVT, Vtach/fib, Bradycardia, Uncontrolled A-fib)                 Telemetry Reviewed: AFib  Indication for Continued Telemetry Use: Arrthymias requiring medical therapy             Imaging: No pertinent imaging reviewed      Recent Cultures (last 7 days):         Last 24 Hours Medication List:   Current Facility-Administered Medications   Medication Dose Route Frequency Provider Last Rate    acetaminophen  650 mg Oral Q6H PRN Deandre Epperson PA-C      atorvastatin  20 mg Oral Daily Deandre Epperson PA-C      diltiazem  5 mg/hr Intravenous Titrated Jimena Mars MD Stopped (01/20/22 1055)    diltiazem  30 mg Oral Q6H [address] Jimena Mars MD      enoxaparin  1 mg/kg Subcutaneous Q12H [address] Jodi Jhaveri PA-C      fluticasone  2 spray Nasal Daily Deandre Epperson PA-C      oxyCODONE  10 mg Oral Q4H PRN Deandre Epperson PA-C      pancrelipase (Lip-Prot-Amyl)  12,000 Units Oral TID With Meals Deandre Epperson PA-C      prochlorperazine  10 mg Oral Q6H PRN Deandre Epperson PA-C      senna  1 tablet Oral Daily Deandre Epperson PA-C          Today, Patient Was Seen By: Adenike White Adriana, DO    **Please Note: This note may have been constructed using a voice recognition system  **

## 2022-01-20 NOTE — ASSESSMENT & PLAN NOTE
· Elevated BNP 1,240  · Likely secondary to new onset afib, patient is dry on exam  · Obtain ECHO  · Continue to monitor

## 2022-01-20 NOTE — OCCUPATIONAL THERAPY NOTE
Occupational Therapy Cancellation Note        Patient Name: Dena Sun  [id number] Date: 1/20/2022 01/20/22 0916   OT Last Visit   OT Visit Date 01/20/22   Note Type   Note type Cancelled Session   Cancel Reasons Medical status     OT order received, chart review performed  At this time, OT evaluation cancelled secondary to unstable medical status/elevation in HR at rest per chart review and conversation with RN, awaiting cardiology consult  OT will continue to follow and evaluate as appropriate      Elaina Adam OTYOBANY/L

## 2022-01-20 NOTE — ED NOTES
Pt dinner arrived  Pt set up for eating  Pt resting comfortably  Will continue to monitor        Migue Garcia RN  01/20/22 4265

## 2022-01-20 NOTE — ASSESSMENT & PLAN NOTE
- Follow with Oncology oupatient with Dr Juliane Thomas Gemcitabine/Abraxane treatments  - Continue oxycodone 10mg Q4hr prn for moderate pain as prescribed by Palliative Care

## 2022-01-20 NOTE — CONSULTS
Consultation - Cardiology   Susu Gutierrez 80 y o  female MRN: [id number]  Unit/Bed#: FT 03 Encounter: [id number]  01/20/22  11:36 AM    Assessment/ Plan:  1-new onset atrial fibrillation: Heart rate currently in 100s-120s with normotensive to low-normotensive BP  [id number] score of at least 4     - Continue rate-control endeavors with diltiazem gtt + diltiazem 30 mg po q6h and metoprolol 25 mg bid,with holding parameters  Consider digoxin, if patient remains tachycardic and BP soft  - Obtain 2D Echocardiogram   - Continue enoxaparin 1mg/kg for anticaoguation   - Continue telemetry  2- Essential Hypertension:  BP currently normotensive     - Hold preadmission losartan  Continue metoprolol for rate control in the setting of AFib  3-Pancreatic cancer:  Falls of Dr Eulalio Alanis as outpatient  Receiving chemotherapy with gemcitabine and Abraxane  Management as per hematologist oncologist and primary team     4-Hyperlipidemia:  Continue statin  5-Tobacco Use Disorder:  Patient states that she is an active, daily smoker, and refuses to quit  - Continue to encourage smoking cessation    - Provide nicotine replacement as needed  History of Present Illness   Physician Requesting Consult: Anthony Wright, *    Reason for Consult / Principal Problem:  Atrial fibrillation with rapid ventricular response  HPI: Susu Gutierrez is a 80y o  year old female with a medical history significant for pancreatic cancer, who was noted to be tachycardic during a visit with palliative care clinic  Patient had associated symptoms of palpitations, lightheadedness and dizziness, and found to be in new onset atrial fibrillation on ED presentation  EKG was reflective of AFib with RVR, with no acute ischemic changes noted  Troponins negative  BNP noted to be elevated at 12 40, however no signs of volume overload  Echo pending at the time of this entry  Patient was initially provided diltiazem 30 p o   And 15 IV in the ED, however no significant improvement in blood pressure after these interventions  Pre-admission antihypertensive losartan was held, and metoprolol started, before converting to Cardizem drip  Weight based Lovenox was started for anticoagulation  Cardiology was consulted for further management of atrial fibrillation with rapid ventricular response  At the time of today's encounter, patient is noted with heart rate in the 110s, with normotensive blood pressure  Patient is asymptomatic at the time of the encounter  Denies any current chest pain, palpitations or shortness of breath  Benign cardiopulmonary examination  Inpatient consult to Cardiology  Consult performed by: Fortino Recinos MD  Consult ordered by: Tammie Collins PA-C          EKG:  Atrial fibrillation with rapid ventricular response  Review of Systems   Constitutional: Negative for chills and fever  HENT: Negative for ear pain and sore throat  Eyes: Negative for pain and visual disturbance  Respiratory: Negative for cough and shortness of breath  Cardiovascular: Positive for palpitations  Negative for chest pain and leg swelling  Gastrointestinal: Negative for abdominal pain and vomiting  Genitourinary: Negative for dysuria and hematuria  Musculoskeletal: Negative for arthralgias and back pain  Skin: Negative for color change and rash  Neurological: Positive for dizziness and light-headedness  Negative for seizures and syncope  Psychiatric/Behavioral: Negative for agitation and behavioral problems  All other systems reviewed and are negative        Historical Information   Past Medical History:   Diagnosis Date    Anxiety     Chronic pancreatitis ([facility] [address] )     Depression     Deviated nasal septum     H/O colonoscopy     6/7/10    Hyperlipidemia     Hypertension     Impaired fasting glucose     Spinal stenosis      Past Surgical History:   Procedure Laterality Date    APPENDECTOMY      CHOLECYSTECTOMY  EXPLORATORY LAPAROTOMY      IR PORT PLACEMENT  11/2/2021    TUBAL LIGATION       Social History     Substance and Sexual Activity   Alcohol Use Never    Comment: wine with tea  a couple of times a week      Social History     Substance and Sexual Activity   Drug Use No     Social History     Tobacco Use   Smoking Status Current Every Day Smoker    Packs/day: 0 50    Types: Cigarettes   Smokeless Tobacco Never Used       Family History:   Family History   Problem Relation Age of Onset    Multiple myeloma Mother     Coronary artery disease Father     Stroke Father         CVA       Meds/Allergies   current meds:   Current Facility-Administered Medications   Medication Dose Route Frequency    acetaminophen (TYLENOL) tablet 650 mg  650 mg Oral Q6H PRN    atorvastatin (LIPITOR) tablet 20 mg  20 mg Oral Daily    diltiazem (CARDIZEM) 125 mg in sodium chloride 0 9 % 125 mL infusion  5 mg/hr Intravenous Titrated    diltiazem (CARDIZEM) tablet 30 mg  30 mg Oral Q6H [address]    enoxaparin (LOVENOX) subcutaneous injection 50 mg  1 mg/kg Subcutaneous Q12H [address]    fluticasone (FLONASE) 50 mcg/act nasal spray 2 spray  2 spray Nasal Daily    metoprolol tartrate (LOPRESSOR) tablet 25 mg  25 mg Oral Q12H [address]    oxyCODONE (ROXICODONE) immediate release tablet 10 mg  10 mg Oral Q4H PRN    pancrelipase (Lip-Prot-Amyl) (CREON) delayed release capsule 12,000 Units  12,000 Units Oral TID With Meals    prochlorperazine (COMPAZINE) tablet 10 mg  10 mg Oral Q6H PRN    senna (SENOKOT) tablet 8 6 mg  1 tablet Oral Daily     Allergies   Allergen Reactions    Morphine Abdominal Pain       Objective   Vitals: Blood pressure (!) 85/54, pulse (!) 126, temperature 98 7 °F (37 1 °C), temperature source Oral, resp  rate 19, height 5' 6" (1 676 m), weight 49 9 kg (110 lb), SpO2 96 %  , Body mass index is 17 75 kg/m² ,   Orthostatic Blood Pressures      Most Recent Value   Blood Pressure 85/54 filed at 01/20/2022 1055   Patient Position - Orthostatic VS Lying filed at 01/20/2022 9071          Systolic (78BYT), NJO:484 , Min:80 , BDD:059     Diastolic (75SQK), XJK:77, Min:48, Max:72        Intake/Output Summary (Last 24 hours) at 1/20/2022 1136  Last data filed at 1/20/2022 0249  Gross per 24 hour   Intake 1000 ml   Output --   Net 1000 ml       Invasive Devices  Report    Central Venous Catheter Line            Port A Cath 11/02/21 Right Chest 79 days          Peripheral Intravenous Line            Peripheral IV 01/19/22 Left Forearm <1 day                    Physical Exam:  GEN: Alert and oriented x 3, in no acute distress  Well appearing and well nourished  HEENT: Sclera anicteric, conjunctivae pink, mucous membranes moist  Oropharynx clear  NECK: Supple, no carotid bruits, no significant JVD  Trachea midline, no thyromegaly  HEART:  Irregularly irregular rhythm, normal S1 and S2, no murmurs, clicks, gallops or rubs  PMI nondisplaced, no thrills  LUNGS: Clear to auscultation bilaterally; no wheezes, rales, or rhonchi  No increased work of breathing or signs of respiratory distress  ABDOMEN: Soft, nontender, nondistended, normoactive bowel sounds  EXTREMITIES: Skin warm and well perfused, no clubbing, cyanosis, or edema  NEURO: No focal findings  Normal speech  Mood and affect normal    SKIN: Normal without suspicious lesions on exposed skin        Lab Results    Troponins:   Results from last 7 days   Lab Units 01/20/22  0422   CK TOTAL U/L 25*       CBC with diff:   Results from last 7 days   Lab Units 01/20/22  0514 01/20/22  0422 01/19/22  1820 01/14/22  1120   WBC Thousand/uL 6 36 5 93 10 90* 5 91   HEMOGLOBIN g/dL 10 1* 9 8* 12 8 10 7*   HEMATOCRIT % 30 0* 30 1* 39 8 33 1*   MCV fL 102* 101* 102* 102*   PLATELETS Thousands/uL 193 178 274 168   MCH pg 34 4* 33 0 32 8 32 9   MCHC g/dL 33 7 32 6 32 2 32 3   RDW % 13 2 13 2 13 5 13 8   MPV fL 10 0 9 5 9 8 9 7   NRBC AUTO /100 WBCs  --   --  0 0         CMP:   Results from last 7 days Lab Units 01/20/22  0422 01/19/22  1820 01/14/22  1120   POTASSIUM mmol/L 4 0 3 9 3 5   CHLORIDE mmol/L 110* 104 105   CO2 mmol/L 25 24 24   BUN mg/dL 11 12 13   CREATININE mg/dL 0 62 0 78 0 76   CALCIUM mg/dL 7 5* 8 9 8 8   AST U/L 22 28 15   ALT U/L 33 37 34   ALK PHOS U/L 96 135* 128*   EGFR ml/min/1 73sq m 86 72 74

## 2022-01-20 NOTE — PLAN OF CARE
Problem: Potential for Falls  Goal: Patient will remain free of falls  Description: INTERVENTIONS:  - Educate patient/family on patient safety including physical limitations  - Instruct patient to call for assistance with activity   - Consult OT/PT to assist with strengthening/mobility   - Keep Call bell within reach  - Keep bed low and locked with side rails adjusted as appropriate  - Keep care items and personal belongings within reach  - Initiate and maintain comfort rounds  - Make Fall Risk Sign visible to staff  - Offer Toileting every  Hours, in advance of need  - Initiate/Maintain alarm  - Obtain necessary fall risk management equipment:   - Apply yellow socks and bracelet for high fall risk patients  - Consider moving patient to room near nurses station  Outcome: Progressing     Problem: PAIN - ADULT  Goal: Verbalizes/displays adequate comfort level or baseline comfort level  Description: Interventions:  - Encourage patient to monitor pain and request assistance  - Assess pain using appropriate pain scale  - Administer analgesics based on type and severity of pain and evaluate response  - Implement non-pharmacological measures as appropriate and evaluate response  - Consider cultural and social influences on pain and pain management  - Notify physician/advanced practitioner if interventions unsuccessful or patient reports new pain  Outcome: Progressing     Problem: INFECTION - ADULT  Goal: Absence or prevention of progression during hospitalization  Description: INTERVENTIONS:  - Assess and monitor for signs and symptoms of infection  - Monitor lab/diagnostic results  - Monitor all insertion sites, i e  indwelling lines, tubes, and drains  - Monitor endotracheal if appropriate and nasal secretions for changes in amount and color  - [place] appropriate cooling/warming therapies per order  - Administer medications as ordered  - Instruct and encourage patient and family to use good hand hygiene technique  - Identify and instruct in appropriate isolation precautions for identified infection/condition  Outcome: Progressing  Goal: Absence of fever/infection during neutropenic period  Description: INTERVENTIONS:  - Monitor WBC    Outcome: Progressing     Problem: SAFETY ADULT  Goal: Patient will remain free of falls  Description: INTERVENTIONS:  - Educate patient/family on patient safety including physical limitations  - Instruct patient to call for assistance with activity   - Consult OT/PT to assist with strengthening/mobility   - Keep Call bell within reach  - Keep bed low and locked with side rails adjusted as appropriate  - Keep care items and personal belongings within reach  - Initiate and maintain comfort rounds  - Make Fall Risk Sign visible to staff  - Offer Toileting every Hours, in advance of need  - Initiate/Maintain alarm  - Obtain necessary fall risk management equipment:   - Apply yellow socks and bracelet for high fall risk patients  - Consider moving patient to room near nurses station  Outcome: Progressing  Goal: Maintain or return to baseline ADL function  Description: INTERVENTIONS:  -  Assess patient's ability to carry out ADLs; assess patient's baseline for ADL function and identify physical deficits which impact ability to perform ADLs (bathing, care of mouth/teeth, toileting, grooming, dressing, etc )  - Assess/evaluate cause of self-care deficits   - Assess range of motion  - Assess patient's mobility; develop plan if impaired  - Assess patient's need for assistive devices and provide as appropriate  - Encourage maximum independence but intervene and supervise when necessary  - Involve family in performance of ADLs  - Assess for home care needs following discharge   - Consider OT consult to assist with ADL evaluation and planning for discharge  - Provide patient education as appropriate  Outcome: Progressing  Goal: Maintains/Returns to pre admission functional level  Description: INTERVENTIONS:  - Perform BMAT or MOVE assessment daily    - Set and communicate daily mobility goal to care team and patient/family/caregiver  - Collaborate with rehabilitation services on mobility goals if consulted  - Perform Range of Motion times a day  - Reposition patient every hours  - Dangle patient  times a day  - Stand patient  times a day  - Ambulate patient  times a day  - Out of bed to chair  times a day   - Out of bed for meals  times a day  - Out of bed for toileting  - Record patient progress and toleration of activity level   Outcome: Progressing     Problem: DISCHARGE PLANNING  Goal: Discharge to home or other facility with appropriate resources  Description: INTERVENTIONS:  - Identify barriers to discharge w/patient and caregiver  - Arrange for needed discharge resources and transportation as appropriate  - Identify discharge learning needs (meds, wound care, etc )  - Arrange for interpretive services to assist at discharge as needed  - Refer to Case Management Department for coordinating discharge planning if the patient needs post-hospital services based on physician/advanced practitioner order or complex needs related to functional status, cognitive ability, or social support system  Outcome: Progressing     Problem: Knowledge Deficit  Goal: Patient/family/caregiver demonstrates understanding of disease process, treatment plan, medications, and discharge instructions  Description: Complete learning assessment and assess knowledge base    Interventions:  - Provide teaching at level of understanding  - Provide teaching via preferred learning methods  Outcome: Progressing

## 2022-01-20 NOTE — ASSESSMENT & PLAN NOTE
- new onset AFib with RVR    - cardiology following  - continue on p o   Cardizem 30 mg q i d   - monitor BP closely  - therapeutic Lovenox for stroke prophylaxis; plan for eventual transition to NOAC on discharge  - continue to monitor on telemetry  - follow-up echocardiogram results

## 2022-01-21 PROBLEM — R79.89 ELEVATED BRAIN NATRIURETIC PEPTIDE (BNP) LEVEL: Status: RESOLVED | Noted: 2022-01-01 | Resolved: 2022-01-01

## 2022-01-21 NOTE — ASSESSMENT & PLAN NOTE
- new onset AFib with RVR  - managed by cardiology while in patient    Discharge regimen:  - Cardizem  mg daily  - metoprolol tartrate 25 mg b i d   - After lengthy discussion with the patient about transition to oral anticoagulation on discharge she has deferred  The NOAC's are cost prohibitive, and the patient has deferred Coumadin given the requirement for monitoring blood levels and testing  She states this is all in the setting of her poor prognosis with pancreatic cancer  Stroke risk discussed at length  She has a chads Vasc score of 4 which equates to a 4 8% risk of stroke yearly, this was explained in detail  Patient also had similar discussion with Cardiology    - follow-up with Cardiology as an outpatient in 2 weeks; will need Holter monitor as well

## 2022-01-21 NOTE — ED NOTES
Pt due for Cardizem PO however did not meet the SBP parameters for it  Provider contacted and notified of this        Fermin Robertson RN  01/21/22 0681

## 2022-01-21 NOTE — PROGRESS NOTES
[facility]  Progress Note - Dena Sun 1940, 80 y o  female MRN: [id number]  Unit/Bed#: ED 25 Encounter: [id number]  Primary Care Provider: John Baird MD   Date and time admitted to hospital: 1/19/2022  5:55 PM    * Atrial fibrillation with RVR ([facility] )  Assessment & Plan  - new onset AFib with RVR    - cardiology following  - continue on p o  Cardizem 30 mg q i d   - loaded with digoxin today; continue 125 mcg daily tomorrow  - monitor BP closely  - therapeutic Lovenox for stroke prophylaxis; plan for eventual transition to NOAC on discharge  - continue to monitor on telemetry  - follow-up echocardiogram results    Pancreatic adenocarcinoma ([facility] )  Assessment & Plan  - Follow with Oncology oupatient with Dr Caryle Service Claryce Serve Gemcitabine/Abraxane treatments  - Continue oxycodone 10mg Q4hr prn for moderate pain as prescribed by Palliative Care    Spinal stenosis  Assessment & Plan  - Pain control  - PT/OT    Tobacco use  Assessment & Plan  - Current everyday smoker  - Declines nicotine supplementation   - Encouraged cessation    Essential hypertension  Assessment & Plan  - hold losartan  - stop metoprolol    - monitor on home cardizem po 30mg QID      Mixed hyperlipidemia  Assessment & Plan  - Continue Lipitor 20mg daily    VTE Pharmacologic Prophylaxis: VTE Score: 5 High Risk (Score >/= 5) - Pharmacological DVT Prophylaxis Ordered: enoxaparin (Lovenox)  Sequential Compression Devices Ordered  Patient Centered Rounds: I performed bedside rounds with nursing staff today  Discussions with Specialists or Other Care Team Provider: Case Management    Education and Discussions with Family / Patient: Patient declined call to   Time Spent for Care: 20 minutes  More than 50% of total time spent on counseling and coordination of care as described above      Current Length of Stay: 2 day(s)  Current Patient Status: Inpatient   Certification Statement: The patient will continue to require additional inpatient hospital stay due to AFIB with RVR  Discharge Plan: Anticipate discharge in 24-48 hrs to home  Code Status: Level 1 - Full Code    Subjective:   Patient seen and examined  Following up for new onset AFib with RVR  Still with heart rates in the 120s and 130s overnight  However pretty asymptomatic  Blood pressure soft  Denies any chest pain or shortness of breath  Objective:     Vitals:   Temp (24hrs), Av 7 °F (37 1 °C), Min:98 7 °F (37 1 °C), Max:98 7 °F (37 1 °C)    Temp:  [98 7 °F (37 1 °C)] 98 7 °F (37 1 °C)  HR:  [] 78  Resp:  [15-25] 18  BP: ()/(50-83) 116/55  SpO2:  [96 %-100 %] 99 %  Body mass index is 17 75 kg/m²  Input and Output Summary (last 24 hours):   No intake or output data in the 24 hours ending 22 1528    PHYSICAL EXAM:    Vitals signs reviewed  Constitutional   Awake and cooperative  NAD  Chronically ill-appearing  Head/Neck   Normocephalic  Atraumatic  HEENT   No scleral icterus  EOMI  Heart   Irregularly irregular  No murmers  Lungs   Clear to auscultation bilaterally  Respirations unlaboured  Abdomen   Soft  Nontender  Nondistended  Skin   Skin color pale  No rashes  Extremities   No deformities  No peripheral edema  Neuro   Alert and oriented  No new deficits  Psych   Mood stable  Affect normal          Additional Data:     Labs:  Results from last 7 days   Lab Units 22  0404 22  0422 22  1820   WBC Thousand/uL 5 22   < > 10 90*   HEMOGLOBIN g/dL 10 1*   < > 12 8   HEMATOCRIT % 30 3*   < > 39 8   PLATELETS Thousands/uL 208   < > 274   NEUTROS PCT %  --   --  83*   LYMPHS PCT %  --   --  12*   MONOS PCT %  --   --  2*   EOS PCT %  --   --  2    < > = values in this interval not displayed       Results from last 7 days   Lab Units 22  0404 22  0422 22  0422   SODIUM mmol/L 142   < > 141   POTASSIUM mmol/L 3 9   < > 4 0   CHLORIDE mmol/L 111*   < > 110*   CO2 mmol/L 22   < > 25 BUN mg/dL 16   < > 11   CREATININE mg/dL 0 66   < > 0 62   ANION GAP mmol/L 9   < > 6   CALCIUM mg/dL 8 4   < > 7 5*   ALBUMIN g/dL  --   --  2 5*   TOTAL BILIRUBIN mg/dL  --   --  1 59*   ALK PHOS U/L  --   --  96   ALT U/L  --   --  33   AST U/L  --   --  22   GLUCOSE RANDOM mg/dL 109   < > 98    < > = values in this interval not displayed  Results from last 7 days   Lab Units 01/19/22  1820   INR  1 09                   Lines/Drains:  Invasive Devices  Report    Central Venous Catheter Line            Port A Cath 11/02/21 Right Chest 80 days                Central Line:  Goal for removal: Chronic port         Telemetry:  Telemetry Orders (From admission, onward)             48 Hour Telemetry Monitoring  Continuous x 48 hours        References:    Telemetry Guidelines   Question:  Reason for 48 Hour Telemetry  Answer:  Arrhythmias Requiring Medical Therapy (eg  SVT, Vtach/fib, Bradycardia, Uncontrolled A-fib)                 Telemetry Reviewed: AFib  Indication for Continued Telemetry Use: Arrthymias requiring medical therapy             Imaging: No pertinent imaging reviewed      Recent Cultures (last 7 days):   Results from last 7 days   Lab Units 01/19/22  2144   URINE CULTURE  10,000-19,000 cfu/ml        Last 24 Hours Medication List:   Current Facility-Administered Medications   Medication Dose Route Frequency Provider Last Rate    acetaminophen  650 mg Oral Q6H PRN Jenaro Palacios PA-C      atorvastatin  20 mg Oral Daily Jenaro Palacios PA-C      digoxin  125 mcg Oral Daily GERA Higgins      diltiazem  30 mg Oral Q6H [facility] Henri Prakash MD      enoxaparin  1 mg/kg Subcutaneous Q12H [facility] Jodi Jhaveri PA-C      fluticasone  2 spray Nasal Daily Jenaro Palacios PA-C      oxyCODONE  10 mg Oral Q4H PRN Jenaro Palacios PA-C      pancrelipase (Lip-Prot-Amyl)  12,000 Units Oral TID With Meals Jenaro Palacios PA-C      polyethylene glycol  17 g Oral Daily PRN Ligia Wright DO      prochlorperazine  10 mg Oral Q6H PRN Sarah Ortega PA-C      senna  1 tablet Oral Daily Sarah Ortega PA-C          Today, Patient Was Seen By: Jenifer Wright DO    **Please Note: This note may have been constructed using a voice recognition system  **

## 2022-01-21 NOTE — PROGRESS NOTES
Cardiology Progress Note - Meera Sheehan 80 y o  female MRN: [id number]    Unit/Bed#: ED 25 Encounter: [id number]      Assessment/Plan:  1  New onset Afib  HR remains elevated 120-130s  Continue diltiazem 30mg q6h  Start digoxin 250mcg IV once then 125mcg PO daily  [id number] VASc 4, continue therapeutic lovenox, she will need to be transitioned to PO [facility]  Continue telemetry monitoring    2  Hyperlipidemia  Continue atorvastatin 20mg daily    3  Pancreatic cancer  Follows with Dr Neelima Espino from Oncology and Palliative Medicine for pain management  Tx with Gemcitabine/Abraxane    4  Active smoker  Cessation advised    5  BMI 17  Subjective:   Patient seen and examined  Patient remains tachycardic  Patient denies any chest pain, shortness of breath, palpitations, or lower extremity edema  Objective:     Vitals: Blood pressure 117/57, pulse 83, temperature 98 7 °F (37 1 °C), resp  rate 18, height 5' 6" (1 676 m), weight 49 9 kg (110 lb), SpO2 100 %  , Body mass index is 17 75 kg/m² ,   Orthostatic Blood Pressures      Most Recent Value   Blood Pressure 117/57 filed at 01/21/2022 1207   Patient Position - Orthostatic VS Sitting filed at 01/21/2022 1207          No intake or output data in the 24 hours ending 01/21/22 1415      Physical Exam:  Physical Exam  Vitals and nursing note reviewed  Constitutional:       General: She is not in acute distress  Appearance: She is well-developed and underweight  HENT:      Head: Normocephalic and atraumatic  Eyes:      Conjunctiva/sclera: Conjunctivae normal    Cardiovascular:      Rate and Rhythm: Normal rate  Rhythm irregularly irregular  Heart sounds: No murmur heard  Pulmonary:      Effort: Pulmonary effort is normal  No respiratory distress  Breath sounds: Normal breath sounds  Abdominal:      Palpations: Abdomen is soft  Tenderness: There is no abdominal tenderness  Musculoskeletal:      Cervical back: Neck supple        Right lower leg: No edema  Left lower leg: No edema  Skin:     General: Skin is warm and dry  Neurological:      Mental Status: She is alert                Medications:      Current Facility-Administered Medications:     acetaminophen (TYLENOL) tablet 650 mg, 650 mg, Oral, Q6H PRN, Raul Nichols PA-C    atorvastatin (LIPITOR) tablet 20 mg, 20 mg, Oral, Daily, Jodi Jhaveri PA-C, 20 mg at 01/20/22 1749    digoxin (LANOXIN) tablet 125 mcg, 125 mcg, Oral, Daily, GERA Angeles, 125 mcg at 01/21/22 1118    diltiazem (CARDIZEM) tablet 30 mg, 30 mg, Oral, Q6H [address], Spencer Pedro MD, 30 mg at 01/21/22 1118    enoxaparin (LOVENOX) subcutaneous injection 50 mg, 1 mg/kg, Subcutaneous, Q12H [address], Jodi Jhaveri PA-C, 50 mg at 01/21/22 1120    fluticasone (FLONASE) 50 mcg/act nasal spray 2 spray, 2 spray, Nasal, Daily, Jodi Jhaveri PA-C    oxyCODONE (ROXICODONE) immediate release tablet 10 mg, 10 mg, Oral, Q4H PRN, Raul Nichols PA-C, 10 mg at 01/19/22 2322    pancrelipase (Lip-Prot-Amyl) (CREON) delayed release capsule 12,000 Units, 12,000 Units, Oral, TID With Meals, Raul Nichols PA-C, 12,000 Units at 01/21/22 1306    polyethylene glycol (MIRALAX) packet 17 g, 17 g, Oral, Daily PRN, Darylene Alanna Starsinic, DO    prochlorperazine (COMPAZINE) tablet 10 mg, 10 mg, Oral, Q6H PRN, Raul Nichols PA-C    senna (SENOKOT) tablet 8 6 mg, 1 tablet, Oral, Daily, Jodi Jhaveri PA-C, 8 6 mg at 01/20/22 1019    Current Outpatient Medications:     atorvastatin (LIPITOR) 20 mg tablet, TAKE 1 TABLET EVERY DAY, Disp: 90 tablet, Rfl: 3    escitalopram (LEXAPRO) 20 mg tablet, Take 1 tablet (20 mg total) by mouth daily (Patient not taking: Reported on 11/9/2021 ), Disp: 90 tablet, Rfl: 5    fluticasone (FLONASE) 50 mcg/act nasal spray, 2 sprays into each nostril daily, Disp: , Rfl:      MG tablet, TAKE 1 TABLET (600 MG TOTAL) BY MOUTH 3 (THREE) TIMES A DAY (Patient not taking: Reported on 12/27/2021), Disp: 270 tablet, Rfl: 3    losartan (COZAAR) 50 mg tablet, TAKE 1 TABLET (50 MG TOTAL) BY MOUTH DAILY, Disp: 90 tablet, Rfl: 3    lubiprostone (AMITIZA) 24 mcg capsule, Take 1 capsule (24 mcg total) by mouth 2 (two) times a day with meals for 14 days, Disp: 28 capsule, Rfl: 1    ondansetron (ZOFRAN) 4 mg tablet, Take 1 tablet (4 mg total) by mouth every 8 (eight) hours as needed for nausea or vomiting, Disp: 30 tablet, Rfl: 0    oxyCODONE (ROXICODONE) 20 MG TABS, Take 0 5-1 tablets (10-20 mg total) by mouth every 4 (four) hours as needed for moderate pain Max Daily Amount: 120 mg, Disp: 90 tablet, Rfl: 0    pancrelipase, Lip-Prot-Amyl, (CREON) 12,000 units capsule, Take 12,000 units of lipase by mouth 3 (three) times a day with meals, Disp: 270 capsule, Rfl: 3    polyethylene glycol (MIRALAX) 17 g packet, Take 17 g by mouth daily as needed (Constipation), Disp: 20 each, Rfl: 0    prochlorperazine (COMPAZINE) 10 mg tablet, Take 1 tablet (10 mg total) by mouth every 6 (six) hours as needed for nausea or vomiting, Disp: 30 tablet, Rfl: 0    senna (SENOKOT) 8 6 MG tablet, Take 1 tablet (8 6 mg total) by mouth daily, Disp: 30 tablet, Rfl: 0     Labs & Results:     Results from last 7 days   Lab Units 01/20/22  0422 01/19/22  1820   CK TOTAL U/L 25*  --    HS TNI 0HR ng/L  --  9   NT-PRO BNP pg/mL  --  1,240*     Results from last 7 days   Lab Units 01/21/22  0404 01/20/22  0514 01/20/22  0422   WBC Thousand/uL 5 22 6 36 5 93   HEMOGLOBIN g/dL 10 1* 10 1* 9 8*   HEMATOCRIT % 30 3* 30 0* 30 1*   PLATELETS Thousands/uL 208 193 178         Results from last 7 days   Lab Units 01/21/22  0404 01/20/22  0422 01/19/22  1820   POTASSIUM mmol/L 3 9 4 0 3 9   CHLORIDE mmol/L 111* 110* 104   CO2 mmol/L 22 25 24   BUN mg/dL 16 11 12   CREATININE mg/dL 0 66 0 62 0 78   CALCIUM mg/dL 8 4 7 5* 8 9   ALK PHOS U/L  --  96 135*   ALT U/L  --  33 37   AST U/L  --  22 28     Results from last 7 days   Lab Units 01/19/22  1820   INR  1 09   PTT seconds 29 Results from last 7 days   Lab Units 22  0422   MAGNESIUM mg/dL 1 9       Vitals: Blood pressure 117/57, pulse 83, temperature 98 7 °F (37 1 °C), resp  rate 18, height 5' 6" (1 676 m), weight 49 9 kg (110 lb), SpO2 100 %  , Body mass index is 17 75 kg/m² ,   Orthostatic Blood Pressures      Most Recent Value   Blood Pressure 117/57 filed at 2022 1207   Patient Position - Orthostatic VS Sitting filed at 2022 3610          Systolic (71VVL), RY , Min:91 , HFI:137     Diastolic (05IYR), FPO:23, Min:50, Max:83      No intake or output data in the 24 hours ending 22 1415    Invasive Devices  Report    Central Venous Catheter Line            Port A Cath 21 Right Chest 80 days                  EKG: Atrial fibrillation with rapid ventricular response  Low voltage QRS  Cannot rule out Anterior infarct , age undetermined  Abnormal ECG  When compared with ECG of 2022 18:13, (unconfirmed)  No significant change was found  Confirmed by Mira Saunders ([id number]) on 2022 11:43:26 AM    Telemetry:  Telemetry Orders (From admission, onward)             48 Hour Telemetry Monitoring  Continuous x 48 hours        Expiring   References:    Telemetry Guidelines   Question:  Reason for 48 Hour Telemetry  Answer:  Arrhythmias Requiring Medical Therapy (eg  SVT, Vtach/fib, Bradycardia, Uncontrolled A-fib)                 Telemetry Reviewed: Atrial fibrillation   HR averaging 120s  Indication for Continued Telemetry Use: Arrthymias requiring medical therapy    BP Readings from Last 3 Encounters:   22 117/57   22 128/80   22 151/64      Wt Readings from Last 3 Encounters:   22 49 9 kg (110 lb)   22 50 3 kg (111 lb)   22 49 7 kg (109 lb 9 6 oz)

## 2022-01-21 NOTE — ASSESSMENT & PLAN NOTE
- Follow with Oncology oupatient with Dr Javier Roberts Gemcitabine/Abraxane treatments  - Continue oxycodone 10mg Q4hr prn for moderate pain as prescribed by Palliative Care

## 2022-01-22 NOTE — PLAN OF CARE
Problem: Potential for Falls  Goal: Patient will remain free of falls  Description: INTERVENTIONS:  - Educate patient/family on patient safety including physical limitations  - Instruct patient to call for assistance with activity   - Consult OT/PT to assist with strengthening/mobility   - Keep Call bell within reach  - Keep bed low and locked with side rails adjusted as appropriate  - Keep care items and personal belongings within reach  - Initiate and maintain comfort rounds  - Make Fall Risk Sign visible to staff  - Offer Toileting every  Hours, in advance of need  - Initiate/Maintain alarm  - Obtain necessary fall risk management equipment:   - Apply yellow socks and bracelet for high fall risk patients  - Consider moving patient to room near nurses station  Outcome: Progressing     Problem: PAIN - ADULT  Goal: Verbalizes/displays adequate comfort level or baseline comfort level  Description: Interventions:  - Encourage patient to monitor pain and request assistance  - Assess pain using appropriate pain scale  - Administer analgesics based on type and severity of pain and evaluate response  - Implement non-pharmacological measures as appropriate and evaluate response  - Consider cultural and social influences on pain and pain management  - Notify physician/advanced practitioner if interventions unsuccessful or patient reports new pain  Outcome: Progressing     Problem: INFECTION - ADULT  Goal: Absence or prevention of progression during hospitalization  Description: INTERVENTIONS:  - Assess and monitor for signs and symptoms of infection  - Monitor lab/diagnostic results  - Monitor all insertion sites, i e  indwelling lines, tubes, and drains  - Monitor endotracheal if appropriate and nasal secretions for changes in amount and color  - [place] appropriate cooling/warming therapies per order  - Administer medications as ordered  - Instruct and encourage patient and family to use good hand hygiene technique  - Identify and instruct in appropriate isolation precautions for identified infection/condition  Outcome: Progressing  Goal: Absence of fever/infection during neutropenic period  Description: INTERVENTIONS:  - Monitor WBC    Outcome: Progressing     Problem: SAFETY ADULT  Goal: Patient will remain free of falls  Description: INTERVENTIONS:  - Educate patient/family on patient safety including physical limitations  - Instruct patient to call for assistance with activity   - Consult OT/PT to assist with strengthening/mobility   - Keep Call bell within reach  - Keep bed low and locked with side rails adjusted as appropriate  - Keep care items and personal belongings within reach  - Initiate and maintain comfort rounds  - Make Fall Risk Sign visible to staff  - Offer Toileting every  Hours, in advance of need  - Initiate/Maintain alarm  - Obtain necessary fall risk management equipment:   - Apply yellow socks and bracelet for high fall risk patients  - Consider moving patient to room near nurses station  Outcome: Progressing  Goal: Maintain or return to baseline ADL function  Description: INTERVENTIONS:  -  Assess patient's ability to carry out ADLs; assess patient's baseline for ADL function and identify physical deficits which impact ability to perform ADLs (bathing, care of mouth/teeth, toileting, grooming, dressing, etc )  - Assess/evaluate cause of self-care deficits   - Assess range of motion  - Assess patient's mobility; develop plan if impaired  - Assess patient's need for assistive devices and provide as appropriate  - Encourage maximum independence but intervene and supervise when necessary  - Involve family in performance of ADLs  - Assess for home care needs following discharge   - Consider OT consult to assist with ADL evaluation and planning for discharge  - Provide patient education as appropriate  Outcome: Progressing  Goal: Maintains/Returns to pre admission functional level  Description: INTERVENTIONS:  - Perform BMAT or MOVE assessment daily    - Set and communicate daily mobility goal to care team and patient/family/caregiver  - Collaborate with rehabilitation services on mobility goals if consulted  - Perform Range of Motion  times a day  - Reposition patient every  hours  - Dangle patient  times a day  - Stand patient  times a day  - Ambulate patient times a day  - Out of bed to chair  times a day   - Out of bed for meals times a day  - Out of bed for toileting  - Record patient progress and toleration of activity level   Outcome: Progressing     Problem: DISCHARGE PLANNING  Goal: Discharge to home or other facility with appropriate resources  Description: INTERVENTIONS:  - Identify barriers to discharge w/patient and caregiver  - Arrange for needed discharge resources and transportation as appropriate  - Identify discharge learning needs (meds, wound care, etc )  - Arrange for interpretive services to assist at discharge as needed  - Refer to Case Management Department for coordinating discharge planning if the patient needs post-hospital services based on physician/advanced practitioner order or complex needs related to functional status, cognitive ability, or social support system  Outcome: Progressing     Problem: Knowledge Deficit  Goal: Patient/family/caregiver demonstrates understanding of disease process, treatment plan, medications, and discharge instructions  Description: Complete learning assessment and assess knowledge base    Interventions:  - Provide teaching at level of understanding  - Provide teaching via preferred learning methods  Outcome: Progressing     Problem: MOBILITY - ADULT  Goal: Maintain or return to baseline ADL function  Description: INTERVENTIONS:  -  Assess patient's ability to carry out ADLs; assess patient's baseline for ADL function and identify physical deficits which impact ability to perform ADLs (bathing, care of mouth/teeth, toileting, grooming, dressing, etc )  - Assess/evaluate cause of self-care deficits   - Assess range of motion  - Assess patient's mobility; develop plan if impaired  - Assess patient's need for assistive devices and provide as appropriate  - Encourage maximum independence but intervene and supervise when necessary  - Involve family in performance of ADLs  - Assess for home care needs following discharge   - Consider OT consult to assist with ADL evaluation and planning for discharge  - Provide patient education as appropriate  Outcome: Progressing  Goal: Maintains/Returns to pre admission functional level  Description: INTERVENTIONS:  - Perform BMAT or MOVE assessment daily    - Set and communicate daily mobility goal to care team and patient/family/caregiver  - Collaborate with rehabilitation services on mobility goals if consulted  - Perform Range of Motion  times a day  - Reposition patient every  hours    - Dangle patient  times a day  - Stand patient  times a day  - Ambulate patient times a day  - Out of bed to chair  times a day   - Out of bed for meals  times a day  - Out of bed for toileting  - Record patient progress and toleration of activity level   Outcome: Progressing

## 2022-01-22 NOTE — MALNUTRITION/BMI
This medical record reflects one or more clinical indicators suggestive of malnutrition and/or morbid obesity  Malnutrition Findings:   Adult Malnutrition type: Chronic illness  Adult Degree of Malnutrition: Other severe protein calorie malnutrition (related to catabolic illness as evidenced by 25#/19% weight loss since 4/6/21 135#, sunken orbitals, subcutaneous fat loss orbital/cheek region and extremities, clavicle visible, <75% energy intake compared to estimated needs >1 month )  Malnutrition Characteristics: Fat loss,Muscle loss,Inadequate energy,Weight loss     Recommend Regular diet, thin liquids with vanilla ensure enlive at breakfast and lunch  BMI Findings:  Adult BMI Classifications: Underweight < 18 5     Body mass index is 17 75 kg/m²  See Nutrition note dated 01/22/2022 for additional details  Completed nutrition assessment is viewable in the nutrition documentation

## 2022-01-22 NOTE — PROGRESS NOTES
[address]  Progress Note - Lyla Yadav 1940, 80 y o  female MRN: [id number]  Unit/Bed#: -02 Encounter: [id number]  Primary Care Provider: Gregor Regalado MD   Date and time admitted to hospital: 1/19/2022  5:55 PM    * Atrial fibrillation with RVR ([address] )  Assessment & Plan  - new onset AFib with RVR  - heart rate improved    - cardiology following  - continue on p o  Cardizem 30 mg q i d   - continue digoxin 125 mcg daily  - started back on metoprolol 25 mg b i d   - monitor BP closely  - therapeutic Lovenox for stroke prophylaxis; however after lengthy discussion with the patient about transition to oral anticoagulation on discharge she has deferred  The NOAC's are cost prohibitive, and the patient has deferred Coumadin given the requirement for monitoring blood levels and testing  She states this is all in the setting of her poor prognosis with pancreatic cancer  Stroke risk discussed at length    She has a chads Vasc score of 4 which equates to a 4 8% risk of stroke yearly, this was explained in detail   - continue to monitor on telemetry      Pancreatic adenocarcinoma [facility])  Assessment & Plan  - Follow with Oncology oupatient with Dr Gus Caba Gemcitabine/Abraxane treatments  - Continue oxycodone 10mg Q4hr prn for moderate pain as prescribed by Palliative Care    Spinal stenosis  Assessment & Plan  - Pain control  - PT/OT    Drug-induced constipation  Assessment & Plan  - patient requesting enema    Anemia  Assessment & Plan  - hemoglobin at 7 5; leukocytes also low, and platelets borderline low  - suspect this is chemotherapy/malignancy affect    - continue to monitor for signs or symptoms of bleeding; none yet  - daily CBC    Tobacco use  Assessment & Plan  - Current everyday smoker  - Declines nicotine supplementation   - Encouraged cessation    Essential hypertension  Assessment & Plan  - hold losartan  - stop metoprolol    - monitor on home cardizem po 30mg QID      Mixed hyperlipidemia  Assessment & Plan  - Continue Lipitor 20mg daily    VTE Pharmacologic Prophylaxis: VTE Score: 5 High Risk (Score >/= 5) - Pharmacological DVT Prophylaxis Ordered: enoxaparin (Lovenox)  Sequential Compression Devices Ordered  Patient Centered Rounds: I performed bedside rounds with nursing staff today  Discussions with Specialists or Other Care Team Provider: Case Management    Education and Discussions with Family / Patient: Updated  (son) via phone  Time Spent for Care: 20 minutes  More than 50% of total time spent on counseling and coordination of care as described above  Current Length of Stay: 3 day(s)  Current Patient Status: Inpatient   Certification Statement: The patient will continue to require additional inpatient hospital stay due to AFIB with RVR  Discharge Plan: Anticipate discharge tomorrow to home  Code Status: Level 1 - Full Code    Subjective:   Patient seen and examined  Following up for new onset AFib with RVR  Heart rate better but still with person to the 130s  Patient has been much improved from a symptomatic standpoint  Complains of constipation  Objective:     Vitals:   Temp (24hrs), Av 2 °F (36 8 °C), Min:97 8 °F (36 6 °C), Max:98 6 °F (37 °C)    Temp:  [97 8 °F (36 6 °C)-98 6 °F (37 °C)] 97 8 °F (36 6 °C)  HR:  [] 81  Resp:  [18] 18  BP: (103-120)/(50-60) 120/54  SpO2:  [82 %-99 %] 99 %  Body mass index is 17 75 kg/m²  Input and Output Summary (last 24 hours): Intake/Output Summary (Last 24 hours) at 2022 1408  Last data filed at 2022 0900  Gross per 24 hour   Intake 600 ml   Output --   Net 600 ml       PHYSICAL EXAM:    Vitals signs reviewed  Constitutional   Awake and cooperative  NAD  Chronically ill-appearing  Head/Neck   Normocephalic  Atraumatic  HEENT   No scleral icterus  EOMI  Heart   Irregularly irregular  No murmers  Lungs   Clear to auscultation bilaterally   Respirations unlaboured  Abdomen   Soft  Nontender  Nondistended  Skin   Skin color pale  No rashes  Extremities   No deformities  No peripheral edema  Neuro   Alert and oriented  No new deficits  Psych   Mood stable  Affect normal          Additional Data:     Labs:  Results from last 7 days   Lab Units 01/22/22  0606 01/21/22  2210 01/21/22  2210 01/20/22  0422 01/19/22  1820   WBC Thousand/uL 3 18*   < > 3 87*   < > 10 90*   HEMOGLOBIN g/dL 7 5*   < > 7 3*   < > 12 8   HEMATOCRIT % 23 3*   < > 22 3*   < > 39 8   PLATELETS Thousands/uL 176   < > 165   < > 274   NEUTROS PCT %  --   --   --   --  83*   LYMPHS PCT %  --   --   --   --  12*   LYMPHO PCT %  --   --  28  --   --    MONOS PCT %  --   --   --   --  2*   MONO PCT %  --   --  2*  --   --    EOS PCT %  --   --  7*  --  2    < > = values in this interval not displayed  Results from last 7 days   Lab Units 01/22/22  0606 01/21/22  2209 01/21/22  2209   SODIUM mmol/L 146*   < > 143   POTASSIUM mmol/L 3 1*   < > 3 3*   CHLORIDE mmol/L 119*   < > 115*   CO2 mmol/L 20*   < > 21   BUN mg/dL 10   < > 13   CREATININE mg/dL 0 46*   < > 0 57*   ANION GAP mmol/L 7   < > 7   CALCIUM mg/dL 6 3*   < > 6 8*   ALBUMIN g/dL  --   --  2 3*   TOTAL BILIRUBIN mg/dL  --   --  0 19*   ALK PHOS U/L  --   --  93   ALT U/L  --   --  33   AST U/L  --   --  20   GLUCOSE RANDOM mg/dL 82   < > 109    < > = values in this interval not displayed       Results from last 7 days   Lab Units 01/19/22  1820   INR  1 09                   Lines/Drains:  Invasive Devices  Report    Central Venous Catheter Line            Port A Cath 11/02/21 Right Chest 81 days                Central Line:  Goal for removal: Chronic port         Telemetry:  Telemetry Orders (From admission, onward)             48 Hour Telemetry Monitoring  Continuous x 48 hours        References:    Telemetry Guidelines   Question:  Reason for 48 Hour Telemetry  Answer:  Arrhythmias Requiring Medical Therapy (eg  SVT, Vtach/fib, Bradycardia, Uncontrolled A-fib)                 Telemetry Reviewed: AFib  Indication for Continued Telemetry Use: Arrthymias requiring medical therapy             Imaging: No pertinent imaging reviewed  Recent Cultures (last 7 days):   Results from last 7 days   Lab Units 01/19/22  2144   URINE CULTURE  10,000-19,000 cfu/ml        Last 24 Hours Medication List:   Current Facility-Administered Medications   Medication Dose Route Frequency Provider Last Rate    acetaminophen  650 mg Oral Q6H PRN Cynthia Bello PA-C      atorvastatin  20 mg Oral Daily Jodi Jhaveri PA-C      diltiazem  30 mg Oral Q6H [address] Mati Duenas MD      enoxaparin  1 mg/kg Subcutaneous Q12H [address] Jodi Jhaveri PA-C      fluticasone  2 spray Nasal Daily Jodi Jhaveri PA-C      metoprolol tartrate  25 mg Oral Q12H [address] Doron Koroma PA-C      oxyCODONE  10 mg Oral Q4H PRN Cynthia Bello PA-C      pancrelipase (Lip-Prot-Amyl)  12,000 Units Oral TID With Meals Cynthia Bello PA-C      polyethylene glycol  17 g Oral Daily PRN Eh Wright,       potassium chloride  40 mEq Oral Once Eh Wright, DO      potassium chloride  20 mEq Intravenous Once Eh Wright DO      prochlorperazine  10 mg Oral Q6H PRN Cynthia Bello PA-C      senna  1 tablet Oral Daily Cynthia Bello PA-C      sodium phosphate-biphosphate  1 enema Rectal Once Eh Wright DO          Today, Patient Was Seen By: Eh Wright DO    **Please Note: This note may have been constructed using a voice recognition system  **

## 2022-01-22 NOTE — PROGRESS NOTES
Cardiology Progress Note   Eugenio Soriano 80 y o  female MRN: [id number]    Unit/Bed#: -02 Encounter: [id number]    Assessment:   1  New onset atrial fibrillation   2  Acute anemia   3  Pancreatic CA   4  Active tobacco abuse     Plan:   HR remains elevated on telemetry (100-120s)  Will discontinue digoxin given risk of toxicity   Replace with Lopressor 25mg BID  Continue with Cardizem 30mg Q6h  Continue telemetry monitoring overnight    Risks vs benefits of anticoagulation were discussed with patient and son today  She states she does not want anticoagulation and states the doctors "are forcing me to take the blood thinners to kill me like my "  Reports "my   from [address] [place] [address] Street and we sued the doctors because of it, i'm not going to take it, im leaving"  We again explained that decision for anticoagulation is the patients choice and we will respect her wishes  Risk of CVA was discussed at length  She finally agreed to stay overnight for medication adjustment and overnight monitoring  In light of above as well as acute anemia, will discontinue Lovenox at this time  Smoking cessation was advised  Follows with Hematology/oncology Dr Bao Salvador:   TTE 2022:    Left Ventricle: Left ventricular cavity size is normal  Systolic function is normal   Estimated LVEF 55-60%(patient was in rapid AFib during echo exam) Grossly no wall motion abnormalities noted    Aortic Valve: There is mild regurgitation    Mitral Valve: There is moderate annular calcification  There is mild regurgitation    Tricuspid Valve: There is mild regurgitation  CTA PE study 2022:   No pulmonary embolism  Partially visualized perihepatic ascites  Objective:     Vitals: Blood pressure 120/54, pulse 81, temperature 97 8 °F (36 6 °C), temperature source Oral, resp  rate 18, height 5' 6" (1 676 m), weight 49 9 kg (110 lb), SpO2 99 %  , Body mass index is 17 75 kg/m² ,   Orthostatic Blood Pressures Most Recent Value   Blood Pressure 120/54 filed at 01/22/2022 6887   Patient Position - Orthostatic VS Lying filed at 01/22/2022 0309            Intake/Output Summary (Last 24 hours) at 1/22/2022 1357  Last data filed at 1/22/2022 0900  Gross per 24 hour   Intake 600 ml   Output --   Net 600 ml         Physical Exam:    GEN: Erick Euceda alert and oriented x 3  Pallor  HEENT: Sclera anicteric, conjunctivae pink, mucous membranes moist  Oropharynx clear  NECK: supple, no significant JVD, Trachea midline, no thyromegaly  HEART: +irregular rhythm, tachy  no murmurs, clicks, gallops or rubs   LUNGS: clear to auscultation bilaterally; no wheezes, rales, or rhonchi  No increased work of breathing or signs of respiratory distress  ABDOMEN: Soft, nontender, nondistended  EXTREMITIES: Skin warm and well perfused, no clubbing, cyanosis, or edema  NEURO: No focal findings  Normal speech  Mood and affect normal    SKIN: Normal without suspicious lesions on exposed skin      Medications:      Current Facility-Administered Medications:     acetaminophen (TYLENOL) tablet 650 mg, 650 mg, Oral, Q6H PRN, Gallo Chilel PA-C    atorvastatin (LIPITOR) tablet 20 mg, 20 mg, Oral, Daily, Jodi Jhaveri PA-C, 20 mg at 01/21/22 1727    digoxin (LANOXIN) tablet 125 mcg, 125 mcg, Oral, Daily, GERA Valadez, 125 mcg at 01/22/22 1041    diltiazem (CARDIZEM) tablet 30 mg, 30 mg, Oral, Q6H [facility], Ángela Little MD, 30 mg at 01/22/22 0558    enoxaparin (LOVENOX) subcutaneous injection 50 mg, 1 mg/kg, Subcutaneous, Q12H [facility], Jodi Jhaveri PA-C, 50 mg at 01/22/22 1040    fluticasone (FLONASE) 50 mcg/act nasal spray 2 spray, 2 spray, Nasal, Daily, Jodi Jhaveri PA-C    oxyCODONE (ROXICODONE) immediate release tablet 10 mg, 10 mg, Oral, Q4H PRN, Jodi Jhaveri PA-C, 10 mg at 01/19/22 0022    pancrelipase (Lip-Prot-Amyl) (CREON) delayed release capsule 12,000 Units, 12,000 Units, Oral, [employer], Gallo Chilel PA-C, 12,000 Units at 01/22/22 1042    polyethylene glycol (MIRALAX) packet 17 g, 17 g, Oral, Daily PRN, Pam Reynoso Starsinic, DO    potassium chloride (K-DUR,KLOR-CON) CR tablet 40 mEq, 40 mEq, Oral, Once, Pam Bellinic, DO    potassium chloride 20 mEq IVPB (premix), 20 mEq, Intravenous, Once, Pam Bellinic, DO    prochlorperazine (COMPAZINE) tablet 10 mg, 10 mg, Oral, Q6H PRN, Katiana Sierra PA-C    senna (SENOKOT) tablet 8 6 mg, 1 tablet, Oral, Daily, Jodi Jhaveir PA-C, 8 6 mg at 01/22/22 1041     Labs & Results:    Results from last 7 days   Lab Units 01/20/22  0422   CK TOTAL U/L 25*     Results from last 7 days   Lab Units 01/22/22  0606 01/21/22  2210 01/21/22  2121   WBC Thousand/uL 3 18* 3 87* 3 40*   HEMOGLOBIN g/dL 7 5* 7 3* 6 7*   HEMATOCRIT % 23 3* 22 3* 19 8*   PLATELETS Thousands/uL 176 165 142*         Results from last 7 days   Lab Units 01/22/22  0606 01/21/22  2209 01/21/22  0404 01/20/22  0422 01/20/22  0422 01/19/22  1820 01/19/22  1820   POTASSIUM mmol/L 3 1* 3 3* 3 9   < > 4 0   < > 3 9   CHLORIDE mmol/L 119* 115* 111*   < > 110*   < > 104   CO2 mmol/L 20* 21 22   < > 25   < > 24   BUN mg/dL 10 13 16   < > 11   < > 12   CREATININE mg/dL 0 46* 0 57* 0 66   < > 0 62   < > 0 78   CALCIUM mg/dL 6 3* 6 8* 8 4   < > 7 5*   < > 8 9   ALK PHOS U/L  --  93  --   --  96  --  135*   ALT U/L  --  33  --   --  33  --  37   AST U/L  --  20  --   --  22  --  28    < > = values in this interval not displayed       Results from last 7 days   Lab Units 01/19/22  1820   INR  1 09   PTT seconds 29     Results from last 7 days   Lab Units 01/20/22  0422   MAGNESIUM mg/dL 1 9

## 2022-01-22 NOTE — QUICK NOTE
CBC resulted with an acute decrease in all cell lines  Hgb 6 7, down from 10 1  Platelets 226, down from 208  WBC 3 4, down from 5  No signs of bleeding, but was recently started on anticoagulation for Afib RVR stroke prevention  Suspect this is secondary to her chemotherapy for pancreatic adenocarcinoma  Will draw repeat CBC  Repeat shows decreased in hgb to 7 3, and wbc to 3  Platelets are okay at 160  Low suspicion for hemolytic process  Will hold off on blood products secondary to blood shortage, hgb >7 0, and vitals stable  Will obtain FOBT to r/o GI bleed and hold morning dose of lovenox until etiology better established

## 2022-01-22 NOTE — ASSESSMENT & PLAN NOTE
- hemoglobin at 7 5; leukocytes also low, and platelets borderline low  - suspect this is chemotherapy/malignancy affect    - continue to monitor for signs or symptoms of bleeding; none yet  - daily CBC

## 2022-01-23 NOTE — PLAN OF CARE
Problem: PHYSICAL THERAPY ADULT  Goal: Performs mobility at highest level of function for planned discharge setting  See evaluation for individualized goals  Description: Treatment/Interventions: Functional transfer training,LE strengthening/ROM,Elevations,Therapeutic exercise,Endurance training,Patient/family training,Bed mobility,Gait training,Spoke to nursing          See flowsheet documentation for full assessment, interventions and recommendations  Note: Prognosis: Good  Problem List: Decreased strength,Impaired balance,Decreased endurance,Decreased mobility  Assessment: Pt is a 80year old female who presents with atrial fibrillation with RVR on 1/19/22 at [facility]  PT orders received for evaluation and treat with an up and OOB as tolerated order  Pt has a PMH of mixed hyperlipidemia, essential hypertension, tobacco use, anemia, drug-induced constipation, spinal stenosis, and pancreatic adenocarcinoma  Prior to IE the pt was independent with ADLs and functional mobility  The pt would receive help with her IADLs from her son  The pt ambulated household and community distances without the use of an AD  The pt lives in a 2-story home with 1 ROGERIO and a flight of stairs to her bedroom/bathroom  At IE the patient presents with the following impairments including; decreased LE strength, decreased endurance, decreased mobility, impaired balance, gait deviations, and decreased activity tolerance  The pt requires verbal cues and close supervision for ambulation and stair negotiation in order to ensure patient safety  The pt scores a 21 on the AM-PAC and a 60/100 on the Barthel index  The patient presents as an evolving complexity case due to above impairments and need for continued medical management  PT recommendation at time of d/c is for home with home health PT pending progress to help pt return to OF     Barriers to Discharge: None        PT Discharge Recommendation: Home with home health rehabilitation          See flowsheet documentation for full assessment

## 2022-01-23 NOTE — DISCHARGE INSTR - AVS FIRST PAGE
Dear Clarissa Dewitt,     It was our pleasure to care for you here at [facility]  It is our hope that we were always able to exceed the expected standards for your care during your stay  You were hospitalized due to atrial fibrillation  You were cared for on the 4th floor under the service of Piper Jaimes DO with the [facility] who covers for your primary care physician (PCP), Leif Robbins MD, while you were hospitalized  If you have any questions or concerns related to this hospitalization, you may contact us at [phone]  For follow up as well as medication refills, we recommend that you follow up with your primary care physician  A registered nurse will reach out to you by phone within a few days after your discharge to answer any additional questions that you may have after going home  However, at this time we provide for you here, the most important instructions / recommendations at discharge:     Notable Medication Adjustments -   Start taking Cardizem  mg daily  Start taking metoprolol tartrate 25 mg twice daily  Testing Required after Discharge -   You will need to have a Holter monitor to further assess your heart rate  Cardiology office will be calling you to schedule this appointment  Important follow up information -   Please be sure to call and schedule follow-up visit with your family physician within 1-2 weeks of discharge  The cardiology office will also be scheduling a follow-up visit in 2 weeks  Please review this entire after visit summary as additional general instructions including medication list, appointments, activity, diet, any pertinent wound care, and other additional recommendations from your care team that may be provided for you        Sincerely,     Radha Wright DO

## 2022-01-23 NOTE — PHYSICAL THERAPY NOTE
Physical Therapy Evaluation     Patient's Name: Eirck Euceda    Admitting Diagnosis  Atrial fibrillation ([place] [address] ) [I48 91]  Rapid heart rate [R00 0]  Pancreatic cancer ([place] [address] ) [C25 9]  Atrial fibrillation with RVR ([place] [address] ) [I48 91]    Problem List  Patient Active Problem List   Diagnosis    DDD (degenerative disc disease), lumbar    [facility]    Mixed hyperlipidemia    Allergic rhinitis    Impaired fasting glucose    Essential hypertension    Tobacco use    Osteoporosis    Reactive depression    Weight loss    Anemia    Drug-induced constipation    Anxiety    Spinal stenosis    Smoking    Pancreatic adenocarcinoma ([place] [address] )    Encounter for chemotherapy management    Mild protein-calorie malnutrition ([place] [address] )    At high risk for infection due to chemotherapy    Port-A-Cath in place    Medical marijuana use    Cancer related pain    Palliative care patient    Atrial fibrillation with RVR [facility])     Past Medical History  Past Medical History:   Diagnosis Date    Anxiety     Chronic pancreatitis (HCC)     Depression     Deviated nasal septum     H/O colonoscopy     6/7/10    Hyperlipidemia     Hypertension     Impaired fasting glucose     Spinal stenosis      Past Surgical History  Past Surgical History:   Procedure Laterality Date    APPENDECTOMY      CHOLECYSTECTOMY      EXPLORATORY LAPAROTOMY      IR PORT PLACEMENT  11/2/2021    TUBAL LIGATION              01/23/22 0831   PT Last Visit   PT Visit Date 01/23/22   Note Type   Note type Evaluation   Pain Assessment   Pain Assessment Tool 0-10   Pain Score No Pain   Restrictions/Precautions   Weight Bearing Precautions Per Order No   Braces or Orthoses Other (Comment)  (None per pt )   Other Precautions Fall Risk;Telemetry   Home Living   Type of [address] [place] [address] Two level;Bed/bath upstairs;Stairs to enter without rails  (1 [address]; flight of stairs to 2nd floor)   Bathroom Shower/Tub Tub/shower unit   [employer] Standard   Bathroom Equipment Grab bars in shower   [address] Other (Comment)  (none per patient)   Additional Comments Pt ambulates without an AD     Prior Function   Level of [place] Independent with ADLs and functional mobility   Lives With Son   Receives Help From Family   ADL Assistance Independent   IADLs Needs assistance   Falls in the last 6 months 0   General   Family/Caregiver Present No   Cognition   Overall Cognitive Status WFL   Arousal/Participation Alert   Orientation Level Oriented X4   Memory Within functional limits   Following Commands Follows all commands and directions without difficulty   Comments Pt is agreeable to PT evaluation    Subjective   Subjective "I want to go home"   RLE Assessment   RLE Assessment X   Strength RLE   RLE Overall Strength 3+/5  (at least; grossly assessed with functional mobility)   LLE Assessment   LLE Assessment X   Strength LLE   LLE Overall Strength 3+/5  (at least; grossly assessed with functional mobility)   Light Touch   RLE Light Touch Grossly intact   LLE Light Touch Grossly intact   Bed Mobility   Additional Comments Pt was received seated at EOB and was in NAD   Transfers   Sit to Stand 6  Modified independent   Additional items Increased time required   Stand to Sit 6  Modified independent   Additional items Increased time required   Ambulation/Elevation   Gait pattern Short stride;Decreased foot clearance;Shuffling   Gait Assistance 5  Supervision  (close supervision)   Additional items Assist x 1;Verbal cues   Assistive Device None   Distance 15 feet   Stair Management Assistance 5  Supervision  (close supervision; pt performed high marching)   Additional items Assist x 1;Verbal cues   Balance   Static Sitting Good   Dynamic Sitting Fair +   Static Standing Fair +   Dynamic Standing Fair   Ambulatory Fair   Endurance Deficit   Endurance Deficit Yes   Endurance Deficit Description Decreased activity tolerance Activity Tolerance   Activity Tolerance Patient tolerated treatment well   Medical Staff Made Aware PT Asiya   Nurse Made Aware Discussed case with RN Mat Rossi; Post session pt was left seated at EOB and was in NAD, Pt's belongings were within reach    Assessment   Prognosis Good   Problem List Decreased strength; Impaired balance;Decreased endurance;Decreased mobility   Assessment Pt is a 80year old female who presents with atrial fibrillation with RVR on 1/19/22 at [facility]  PT orders received for evaluation and treat with an up and OOB as tolerated order  Pt has a PMH of mixed hyperlipidemia, essential hypertension, tobacco use, anemia, drug-induced constipation, spinal stenosis, and pancreatic adenocarcinoma  Prior to IE the pt was independent with ADLs and functional mobility  The pt would receive help with her IADLs from her son  The pt ambulated household and community distances without the use of an AD  The pt lives in a 2-story home with 1 R[address]ERIO and a flight of stairs to her bedroom/bathroom  At IE the patient presents with the following impairments including; decreased LE strength, decreased endurance, decreased mobility, impaired balance, gait deviations, and decreased activity tolerance  The pt requires verbal cues and close supervision for ambulation and stair negotiation in order to ensure patient safety  The pt scores a 21 on the AM-PAC and a 60/100 on the Barthel index  The patient presents as an evolving complexity case due to above impairments and need for continued medical management  PT recommendation at time of d/c is for home with home health PT pending progress to help pt return to PLOF      Barriers to Discharge None   Goals   STG Expiration Date 02/02/22   Short Term Goal #1 In 7 to 10 days: Pt will increase strength by 1/2 a grade in order to increase ease with transfers, Pt will be able to perform bed mobility independently in order to decrease caregiver burden, Pt will be able to perform transfers independently to improve mobility  Pt will be able to ambulate >150 feet least restrictive AD and mod I in order to increase independence navigating household and community distances, pt will improve gross balance by 1/2 a grade to decrease risk of falls, and the pt will be able to navigate a flight of stairs with a hand rail and mod I to improve home accessibility   Plan   Treatment/Interventions Functional transfer training;LE strengthening/ROM; Elevations; Therapeutic exercise; Endurance training;Patient/family training;Bed mobility;Gait training;Spoke to nursing   PT Frequency 2-3x/wk   Recommendation   PT Discharge Recommendation Home with home health rehabilitation   AM-PAC Basic Mobility Inpatient   Turning in Bed Without Bedrails 4   Lying on Back to Sitting on Edge of Flat Bed 4   Moving Bed to Chair 3   Standing Up From Chair 4   Walk in Room 3   Climb 3-5 Stairs 3   Basic Mobility Inpatient Raw Score 21   Basic Mobility Standardized Score 45 55   Highest Level Of Mobility   JH-HLM Goal 6: Walk 10 steps or more   JH-HLM Highest Level of Mobility 6: Walk 10 steps or more   JH-HLM Goal Achieved Yes   Modified [place] Scale   Modified [place] Scale 3   Barthel Index   Feeding 10   Bathing 0   Grooming Score 5   Dressing Score 5   Bladder Score 5   Bowels Score 10   Toilet Use Score 10   Transfers (Bed/Chair) Score 10   Mobility (Level Surface) Score 0   Stairs Score 5   Barthel Index Score 60     PT Evaluation Time: 0054-0635    Dian Brar, SPT

## 2022-01-23 NOTE — PLAN OF CARE
Problem: INFECTION - ADULT  Goal: Absence or prevention of progression during hospitalization  Description: INTERVENTIONS:  - Assess and monitor for signs and symptoms of infection  - Monitor lab/diagnostic results  - Monitor all insertion sites, i e  indwelling lines, tubes, and drains  - Monitor endotracheal if appropriate and nasal secretions for changes in amount and color  - [place] appropriate cooling/warming therapies per order  - Administer medications as ordered  - Instruct and encourage patient and family to use good hand hygiene technique  - Identify and instruct in appropriate isolation precautions for identified infection/condition  Outcome: Progressing     Problem: Potential for Falls  Goal: Patient will remain free of falls  Description: INTERVENTIONS:  - Educate patient/family on patient safety including physical limitations  - Instruct patient to call for assistance with activity   - Consult OT/PT to assist with strengthening/mobility   - Keep Call bell within reach  - Keep bed low and locked with side rails adjusted as appropriate  - Keep care items and personal belongings within reach  - Initiate and maintain comfort rounds    Problem: Nutrition/Hydration-ADULT  Goal: Nutrient/Hydration intake appropriate for improving, restoring or maintaining nutritional needs  Description: Monitor and assess patient's nutrition/hydration status for malnutrition  Collaborate with interdisciplinary team and initiate plan and interventions as ordered  Monitor patient's weight and dietary intake as ordered or per policy  Utilize nutrition screening tool and intervene as necessary  Determine patient's food preferences and provide high-protein, high-caloric foods as appropriate       INTERVENTIONS:  - Monitor oral intake, urinary output, labs, and treatment plans  - Assess nutrition and hydration status and recommend course of action  - Evaluate amount of meals eaten  - Assist patient with eating if necessary   - Allow adequate time for meals  - Recommend/ encourage appropriate diets, oral nutritional supplements, and - Include patient/family/caregiver in decisions related to nutrition  Outcome: Progressing   Apply yellow socks and bracelet for high fall risk patients  - Consider moving patient to room near nurses station  Outcome: Progressing

## 2022-01-23 NOTE — PROGRESS NOTES
Cardiology Progress Note   Yuko Stahl 80 y o  female MRN: [id number]    Unit/Bed#: -02 Encounter: [id number]      Assessment:   1  New onset atrial fibrillation   2  Acute anemia   3  Pancreatic CA   4  Active tobacco abuse   5  Acute anemia -HgB stable now     Plan:   Heart rates are improved today on telemetry (70-90s)  Continue with Cardizem 30mg Q6h and Lopressor 25mg BID  Will arrange for 48 hour Holter monitor to evaluate her heart rates  Smoking cessation was advised  Risks versus benefits of anticoagulation were again discussed with patient who affirms her decision to remain off of blood thinners at this time  Follows with Hematology/oncology Dr Janes Moreno for management of her pancreatic cancer    Patient is stable cardiac-wise for discharge  Please call/re-consult as needed  Will arrange outpatient follow-up in 2-3 weeks       Diagnostics:   TTE 1/20/2022:    Left Ventricle: Left ventricular cavity size is normal  Systolic function is normal   Estimated LVEF 55-60%(patient was in rapid AFib during echo exam) Grossly no wall motion abnormalities noted    Aortic Valve: There is mild regurgitation    Mitral Valve: There is moderate annular calcification  There is mild regurgitation    Tricuspid Valve: There is mild regurgitation      CTA PE study 1/19/2022:   No pulmonary embolism  Partially visualized perihepatic ascites  Objective:     Vitals: Blood pressure 119/67, pulse 64, temperature 98 5 °F (36 9 °C), resp  rate 18, height 5' 6" (1 676 m), weight 49 9 kg (110 lb), SpO2 97 %  , Body mass index is 17 75 kg/m² ,   Orthostatic Blood Pressures      Most Recent Value   Blood Pressure 119/67 filed at 01/23/2022 1100   Patient Position - Orthostatic VS Lying filed at 01/22/2022 1554          No intake or output data in the 24 hours ending 01/23/22 1231      Physical Exam:    GEN: Yuko Stahl appears well, alert and oriented x 3, pleasant and cooperative   HEENT: Sclera anicteric, conjunctivae pink, mucous membranes moist  Oropharynx clear  NECK: supple, no significant JVD, Trachea midline, no thyromegaly  HEART: regular rhythm, normal S1 and S2, no murmurs, clicks, gallops or rubs   LUNGS: clear to auscultation bilaterally; no wheezes, rales, or rhonchi  No increased work of breathing or signs of respiratory distress  ABDOMEN: Soft, nontender, nondistended  EXTREMITIES: Skin warm and well perfused, no clubbing, cyanosis, or edema  NEURO: No focal findings  Normal speech  Mood and affect normal    SKIN: Normal without suspicious lesions on exposed skin        Medications:      Current Facility-Administered Medications:     acetaminophen (TYLENOL) tablet 650 mg, 650 mg, Oral, Q6H PRN, Maria Guadalupe Suarez PA-C    atorvastatin (LIPITOR) tablet 20 mg, 20 mg, Oral, Daily, Jodi Jhavrei PA-C, 20 mg at 01/22/22 1522    diltiazem (CARDIZEM CD) 24 hr capsule 120 mg, 120 mg, Oral, Daily, Courtney Luis Manuel Starsinic, DO, 120 mg at 01/23/22 1127    fluticasone (FLONASE) 50 mcg/act nasal spray 2 spray, 2 spray, Nasal, Daily, Jodi Jhaveri PA-C, 2 spray at 01/23/22 0820    metoprolol tartrate (LOPRESSOR) tablet 25 mg, 25 mg, Oral, Q12H [address], Sriramperry Nguyen PA-C, 25 mg at 01/23/22 0820    oxyCODONE (ROXICODONE) immediate release tablet 10 mg, 10 mg, Oral, Q4H PRN, Jodi Jhaveri PA-C, 10 mg at 01/23/22 1009    pancrelipase (Lip-Prot-Amyl) (CREON) delayed release capsule 12,000 Units, 12,000 Units, Oral, TID With Meals, Maria Guadalupe Suarez PA-C, 12,000 Units at 01/23/22 1126    polyethylene glycol (MIRALAX) packet 17 g, 17 g, Oral, Daily PRN, Courtney Puxico Starsinic, DO, 17 g at 01/23/22 0843    prochlorperazine (COMPAZINE) tablet 10 mg, 10 mg, Oral, Q6H PRN, Maria Guadalupe Suarez PA-C    senna (SENOKOT) tablet 8 6 mg, 1 tablet, Oral, Daily, Jodi Jhaveri PA-C, 8 6 mg at 01/23/22 0820     Labs & Results:    Results from last 7 days   Lab Units 01/20/22  0422   CK TOTAL U/L 25*     Results from last 7 days   Lab Units 01/23/22  0532 01/22/22  0606 01/21/22  2210   WBC Thousand/uL 3 24* 3 18* 3 87*   HEMOGLOBIN g/dL 9 2* 7 5* 7 3*   HEMATOCRIT % 28 2* 23 3* 22 3*   PLATELETS Thousands/uL 184 176 165         Results from last 7 days   Lab Units 01/23/22  0532 01/22/22  0606 01/21/22  2209 01/21/22  0404 01/20/22  0422 01/19/22  1820 01/19/22  1820   POTASSIUM mmol/L 4 4 3 1* 3 3*   < > 4 0   < > 3 9   CHLORIDE mmol/L 110* 119* 115*   < > 110*   < > 104   CO2 mmol/L 23 20* 21   < > 25   < > 24   BUN mg/dL 10 10 13   < > 11   < > 12   CREATININE mg/dL 0 67 0 46* 0 57*   < > 0 62   < > 0 78   CALCIUM mg/dL 8 5 6 3* 6 8*   < > 7 5*   < > 8 9   ALK PHOS U/L  --   --  93  --  96  --  135*   ALT U/L  --   --  33  --  33  --  37   AST U/L  --   --  20  --  22  --  28    < > = values in this interval not displayed       Results from last 7 days   Lab Units 01/19/22  1820   INR  1 09   PTT seconds 29     Results from last 7 days   Lab Units 01/20/22  0422   MAGNESIUM mg/dL 1 9

## 2022-01-23 NOTE — DISCHARGE SUMMARY
[facility]  Discharge- Jose M Peng 1940, 80 y o  female MRN: [id number]  Unit/Bed#: -02 Encounter: [id number]  Primary Care Provider: Trinidad Rodrigues MD   Date and time admitted to hospital: 1/19/2022  5:55 PM    * Atrial fibrillation with RVR ([address] )  Assessment & Plan  - new onset AFib with RVR  - managed by cardiology while in patient    Discharge regimen:  - Cardizem  mg daily  - metoprolol tartrate 25 mg b i d   - After lengthy discussion with the patient about transition to oral anticoagulation on discharge she has deferred  The NOAC's are cost prohibitive, and the patient has deferred Coumadin given the requirement for monitoring blood levels and testing  She states this is all in the setting of her poor prognosis with pancreatic cancer  Stroke risk discussed at length  She has a chads Vasc score of 4 which equates to a 4 8% risk of stroke yearly, this was explained in detail  Patient also had similar discussion with Cardiology    - follow-up with Cardiology as an outpatient in 2 weeks; will need Holter monitor as well        Pancreatic adenocarcinoma [facility])  Assessment & Plan  - Follow with Oncology oupatient with Dr Kirti Myrick Gemcitabine/Abraxane treatments  - Continue oxycodone 10mg Q4hr prn for moderate pain as prescribed by Palliative Care    Spinal stenosis  Assessment & Plan  - Pain control  - PT/OT    Drug-induced constipation  Assessment & Plan  - patient requesting enema    Anemia  Assessment & Plan  - hemoglobin at 7 5; leukocytes also low, and platelets borderline low  - suspect this is chemotherapy/malignancy affect    - continue to monitor for signs or symptoms of bleeding; none yet  - daily CBC    Tobacco use  Assessment & Plan  - Current everyday smoker  - Declines nicotine supplementation   - Encouraged cessation    Essential hypertension  Assessment & Plan  - losartan discontinued    - will be on metoprolol and Cardizem as above on discharge  Mixed hyperlipidemia  Assessment & Plan  - Continue Lipitor 20mg daily      Discharging Physician / Practitioner: Zoie Aguilera DO  PCP: Anibal Mccabe MD  Admission Date:   Admission Orders (From admission, onward)     Ordered        01/19/22 2215  Inpatient Admission  Once                      Discharge Date: 01/23/22    Consultations During Hospital Stay:  · cardiology    Procedures Performed:   · None    Significant Findings / Test Results:   · New onset afib with RVR    Incidental Findings:   · none     Test Results Pending at Discharge (will require follow up):   · none     Outpatient Tests Requested:  · Will need outaptient Holter monitoring to be arranged by cardiology    Complications:  None    Reason for Admission: New Onset Afib    Hospital Course:   Adam Charles is a 80 y o  female patient who originally presented to the hospital on 1/19/2022 due to tachycardia and shortness of breath  Patient was at a regularly scheduled outpatient appointment with palliative care on the day of presentation when she was noted to have significant tachycardia and referred to the ER  In the emergency room she was diagnosed with AFib with RVR  She initially he required treatment with Cardizem drip, however was quickly transitioned to oral medications  Over the next 48 hours her medications were titrated to achieve adequate rate control  Ultimately she will be discharged on Cardizem  mg daily, and metoprolol 25 mg b i d   After lengthy discussion with both Internal Medicine and Cardiology teams, the patient has deferred stroke prophylaxis with anticoagulation  The risks of stroke were discussed at length  Otherwise patient was feeling well at the time of discharge  All questions and concerns were addressed  She was eager to return home  Please see above list of diagnoses and related plan for additional information       Condition at Discharge: good    Discharge Day Visit / Exam: Subjective:  Patient seen and examined on the day of discharge  Feeling well today  No chest pain or shortness of breath  Eddie Etienne to return home  Vitals: Blood Pressure: 115/55 (01/23/22 0700)  Pulse: 90 (01/23/22 0700)  Temperature: 98 5 °F (36 9 °C) (01/23/22 0525)  Temp Source: Oral (01/22/22 1554)  Respirations: 18 (01/23/22 0700)  Height: 5' 6" (167 6 cm) (01/22/22 1402)  Weight - Scale: 49 9 kg (110 lb) (01/20/22 1019)  SpO2: 96 % (01/23/22 0525)  PHYSICAL EXAM:    Vitals signs reviewed  Constitutional   Awake and cooperative  NAD  Head/Neck   Normocephalic  Atraumatic  HEENT   No scleral icterus  EOMI  Heart   Irregular rhythm  No tachycardia  No murmurs  Lungs   Clear to auscultation bilaterally  Respirations unlaboured  Abdomen   Soft  Nontender  Nondistended  Skin   Skin color pale  No rashes  Extremities   No deformities  No peripheral edema  Neuro   Alert and oriented  No new deficits  Psych   Mood stable  Affect normal      Discussion with Family: Updated  (son) via phone  Discharge instructions/Information to patient and family:   See after visit summary for information provided to patient and family  Provisions for Follow-Up Care:  See after visit summary for information related to follow-up care and any pertinent home health orders  Disposition:   Home    Planned Readmission: no     Discharge Statement:  I spent 40 minutes discharging the patient  This time was spent on the day of discharge  I had direct contact with the patient on the day of discharge  Greater than 50% of the total time was spent examining patient, answering all patient questions, arranging and discussing plan of care with patient as well as directly providing post-discharge instructions  Additional time then spent on discharge activities  Discharge Medications:  See after visit summary for reconciled discharge medications provided to patient and/or family        **Please Note: This note may have been constructed using a voice recognition system**

## 2022-01-23 NOTE — PLAN OF CARE
Problem: Potential for Falls  Goal: Patient will remain free of falls  Description: INTERVENTIONS:  - Educate patient/family on patient safety including physical limitations  - Instruct patient to call for assistance with activity   - Consult OT/PT to assist with strengthening/mobility   - Keep Call bell within reach  - Keep bed low and locked with side rails adjusted as appropriate  - Keep care items and personal belongings within reach  - Initiate and maintain comfort rounds  - Make Fall Risk Sign visible to staff  - Offer Toileting every  Hours, in advance of need  - Initiate/Maintain alarm  - Obtain necessary fall risk management equipment:   - Apply yellow socks and bracelet for high fall risk patients  - Consider moving patient to room near nurses station  Outcome: Progressing     Problem: PAIN - ADULT  Goal: Verbalizes/displays adequate comfort level or baseline comfort level  Description: Interventions:  - Encourage patient to monitor pain and request assistance  - Assess pain using appropriate pain scale  - Administer analgesics based on type and severity of pain and evaluate response  - Implement non-pharmacological measures as appropriate and evaluate response  - Consider cultural and social influences on pain and pain management  - Notify physician/advanced practitioner if interventions unsuccessful or patient reports new pain  Outcome: Progressing     Problem: INFECTION - ADULT  Goal: Absence or prevention of progression during hospitalization  Description: INTERVENTIONS:  - Assess and monitor for signs and symptoms of infection  - Monitor lab/diagnostic results  - Monitor all insertion sites, i e  indwelling lines, tubes, and drains  - Monitor endotracheal if appropriate and nasal secretions for changes in amount and color  - [place] appropriate cooling/warming therapies per order  - Administer medications as ordered  - Instruct and encourage patient and family to use good hand hygiene technique  - Identify and instruct in appropriate isolation precautions for identified infection/condition  Outcome: Progressing  Goal: Absence of fever/infection during neutropenic period  Description: INTERVENTIONS:  - Monitor WBC    Outcome: Progressing     Problem: SAFETY ADULT  Goal: Patient will remain free of falls  Description: INTERVENTIONS:  - Educate patient/family on patient safety including physical limitations  - Instruct patient to call for assistance with activity   - Consult OT/PT to assist with strengthening/mobility   - Keep Call bell within reach  - Keep bed low and locked with side rails adjusted as appropriate  - Keep care items and personal belongings within reach  - Initiate and maintain comfort rounds  - Make Fall Risk Sign visible to staff  - Offer Toileting every Hours, in advance of need  - Initiate/Maintain bed alarm  - Obtain necessary fall risk management equipment:   - Apply yellow socks and bracelet for high fall risk patients  - Consider moving patient to room near nurses station  Outcome: Progressing  Goal: Maintain or return to baseline ADL function  Description: INTERVENTIONS:  -  Assess patient's ability to carry out ADLs; assess patient's baseline for ADL function and identify physical deficits which impact ability to perform ADLs (bathing, care of mouth/teeth, toileting, grooming, dressing, etc )  - Assess/evaluate cause of self-care deficits   - Assess range of motion  - Assess patient's mobility; develop plan if impaired  - Assess patient's need for assistive devices and provide as appropriate  - Encourage maximum independence but intervene and supervise when necessary  - Involve family in performance of ADLs  - Assess for home care needs following discharge   - Consider OT consult to assist with ADL evaluation and planning for discharge  - Provide patient education as appropriate  Outcome: Progressing  Goal: Maintains/Returns to pre admission functional level  Description: INTERVENTIONS:  - Perform BMAT or MOVE assessment daily    - Set and communicate daily mobility goal to care team and patient/family/caregiver  - Collaborate with rehabilitation services on mobility goals if consulted  - Perform Range of Motion 3 times a day  - Reposition patient every 3 hours  - Dangle patient 3 times a day  - Stand patient 3 times a day  - Ambulate patient 3 times a day  - Out of bed to chair 3 times a day   - Out of bed for meals 3 times a day  - Out of bed for toileting  - Record patient progress and toleration of activity level   Outcome: Progressing     Problem: DISCHARGE PLANNING  Goal: Discharge to home or other facility with appropriate resources  Description: INTERVENTIONS:  - Identify barriers to discharge w/patient and caregiver  - Arrange for needed discharge resources and transportation as appropriate  - Identify discharge learning needs (meds, wound care, etc )  - Arrange for interpretive services to assist at discharge as needed  - Refer to Case Management Department for coordinating discharge planning if the patient needs post-hospital services based on physician/advanced practitioner order or complex needs related to functional status, cognitive ability, or social support system  Outcome: Progressing     Problem: Knowledge Deficit  Goal: Patient/family/caregiver demonstrates understanding of disease process, treatment plan, medications, and discharge instructions  Description: Complete learning assessment and assess knowledge base    Interventions:  - Provide teaching at level of understanding  - Provide teaching via preferred learning methods  Outcome: Progressing     Problem: MOBILITY - ADULT  Goal: Maintain or return to baseline ADL function  Description: INTERVENTIONS:  -  Assess patient's ability to carry out ADLs; assess patient's baseline for ADL function and identify physical deficits which impact ability to perform ADLs (bathing, care of mouth/teeth, toileting, grooming, dressing, etc )  - Assess/evaluate cause of self-care deficits   - Assess range of motion  - Assess patient's mobility; develop plan if impaired  - Assess patient's need for assistive devices and provide as appropriate  - Encourage maximum independence but intervene and supervise when necessary  - Involve family in performance of ADLs  - Assess for home care needs following discharge   - Consider OT consult to assist with ADL evaluation and planning for discharge  - Provide patient education as appropriate  Outcome: Progressing  Goal: Maintains/Returns to pre admission functional level  Description: INTERVENTIONS:  - Perform BMAT or MOVE assessment daily    - Set and communicate daily mobility goal to care team and patient/family/caregiver  - Collaborate with rehabilitation services on mobility goals if consulted  - Perform Range of Motion 3 times a day  - Reposition patient every 3 hours  - Dangle patient 3 times a day  - Stand patient 3 times a day  - Ambulate patient 3 times a day  - Out of bed to chair 3 times a day   - Out of bed for meals 3 times a day  - Out of bed for toileting  - Record patient progress and toleration of activity level   Outcome: Progressing     Problem: Nutrition/Hydration-ADULT  Goal: Nutrient/Hydration intake appropriate for improving, restoring or maintaining nutritional needs  Description: Monitor and assess patient's nutrition/hydration status for malnutrition  Collaborate with interdisciplinary team and initiate plan and interventions as ordered  Monitor patient's weight and dietary intake as ordered or per policy  Utilize nutrition screening tool and intervene as necessary  Determine patient's food preferences and provide high-protein, high-caloric foods as appropriate       INTERVENTIONS:  - Monitor oral intake, urinary output, labs, and treatment plans  - Assess nutrition and hydration status and recommend course of action  - Evaluate amount of meals eaten  - Assist patient with eating if necessary   - Allow adequate time for meals  - Recommend/ encourage appropriate diets, oral nutritional supplements, and vitamin/mineral supplements  - Order, calculate, and assess calorie counts as needed  - Recommend, monitor, and adjust tube feedings and TPN/PPN based on assessed needs  - Assess need for intravenous fluids  - Provide specific nutrition/hydration education as appropriate  - Include patient/family/caregiver in decisions related to nutrition  Outcome: Progressing

## 2022-01-24 NOTE — TELEPHONE ENCOUNTER
----- Message from Sriram Nguyen PA-C sent at 1/24/2022  1:48 PM EST -----  Regarding: Hosp [facility]  Cardiology Follow-up:    Patient clinical visit in 4 week at the Cardio location: [place] office. .    Schedule visit with Cardio [facility] Providers: first available provider.    Type of Visit: VISIT TYPE: in-person office visit.    Test ordered: Cardiac tests: holter monitor.

## 2022-01-26 NOTE — PROGRESS NOTES
[facility] notification received for new Cardiac Arrhythmia  Medicare Bundle patient  Chart review completed  Outside records through [facility] requested and refreshed  Patient hospitalized   1/19/2022 - 1/23/2022 (4 days) at [address] [place] [address] for Atrial fibrillation with RVR  Patient presented with c/o high heart rate and SOB  Review of IP CM note indicates patient lives with son in 2 story home, no steps to enter  Patient owns the following DME:  [employer]  Past Medical History  Pancreatic Cancer, tobacco use, DDD (lumbar), AFIB, HTN and drug induced constipation  See problem list for complete list of medical diagnosis       Future appointments:  1/27/2022 2:30 PM Appointment with Jalyn Shankar MD at [address] [facility] ([phone])   1/28/2022 10:20 AM Appointment with Kyrie Quiles MD PhD at [facility] ([phone])   1/28/2022 11:00 AM Appointment with [facility] 2 at [address] [place] [address] ([phone])   1/31/2022  1:00 PM Appointment with [facility] INF CHAIR 6 at [address] [place] [address] ([phone])   1/31/2022 1:30 PM Appointment with Roger Garcia RD at [address] [place] [address] ([phone])   2/3/2022 10:30 AM Appointment with AN POC HOLTER 1 at [facility] and [place] [address] ([phone])   2/7/2022 10:30 AM Appointment with Jalyn Shankar MD at [address] [facility] ([phone])   2/11/2022 11:30 AM Appointment with [address] at [address] [place] [address] ([phone])   2/14/2022 1:00 PM Appointment with [facility] INF CHAIR 11 at [address] [place] [address] ([phone])   2/23/2022 10:00 AM Appointment with Richa Juarez MD at [facility] ([phone])   2/24/2022 1:00 PM Appointment with Suzan Granger MD at [address] ([phone])   2/25/2022 11:00 AM Appointment with Óscar Duarte MD at [facility] ([phone])   2/25/2022 11:00 AM Appointment with [address] [facility] [address] at [address] [facility] ([phone])   2/28/2022 1:00 PM Appointment with [facility] INF CHAIR 4 at [address] [place] [address] ([phone])       CM contacted patient and spoke with son Lulu Marquez to introduce self, explain the role of the OP CM and purpose of this phone call is to assess patient's general wellbeing or for any assistance needed with follow-up care  BPCI-A program explained  Lulu Marquez consented to future outreach of CM     son believes reason for this past hospital admission was due to a reaction to the barium dye patient recently received for a test    AVS reviewed with patient's son who admits that Ja Bond is not taking the Eliquis medication as it was far too expensive therefore the pharmacy did not fill  Son educated on the importance of taking blood thinning medication for irregular heart rate and potential for detriment to patients health if untreated and Lulu Whippleerik reports he has been informed of such and is aware  Patient / family was not provided with any coupon card while patient was admitted  Lulu Marquez believes that patient would be agreeable to taking medication if more cost effective  Son was informed that coupon is good for only a 30day supply and then patient would be responsible for monthly copays  Lulu Marquez is hopeful that another medication can be prescribed  Lulu Marquez states they have a very supportive neighbor who is a retired nurse that checks the patient's heart rate daily  Patient and son lived together in two [facility], 20 stairs between levels  Patient is set up to remain on 1st floor sleeping on sofa bed    Son states that patient is capable of doing steps and also has access to the following DME:  [employer], grab bars, [employer] and [employer]  Patient phone equipped with *[phone] button for emergent assistance  Son denies difficulty with medical bills, prescription coverage, transportation, food, heating or housing  Again he is aware of the importance for patient to take all prescribed medications is he/patient are open to more cost effective blood thinning medication  He is knowledgeable about future appointments and reports compliance with taking all prescribed medications (except Eliquis)  Son provided with call back number of this CM  Reminder sent to self with closure date of medicare bundle  This CM will continue to monitor through chart review, outreach and Asif Medina

## 2022-01-27 PROBLEM — F17.200 SMOKING: Status: RESOLVED | Noted: 2021-01-01 | Resolved: 2022-01-01

## 2022-01-27 NOTE — PROGRESS NOTES
Assessment/Plan:  Over 30 minutes was spent discussing her problems and planning treatment  Problem List Items Addressed This Visit        Digestive    Pancreatic adenocarcinoma [facility])     Follow-up with Oncology            Cardiovascular and Mediastinum    Essential hypertension    Atrial fibrillation with RVR ([address] ) - Primary     After lengthy discussion regarding possible medications she is agreeable take aspirin 325 mg daily  Continue Cardizem  mg daily and Lopressor 25 mg b i d  Musculoskeletal and Integument    DDD (degenerative disc disease), lumbar       Other    Mixed hyperlipidemia     Continue Lipitor 20 mg daily         Reactive depression     Continue Lexapro 20 mg daily                 Subjective:      Patient ID: Karen Rosenberg is a 80 y o  female  Patient comes in for hospital follow-up  She had new onset rapid atrial fibrillation  She was also found to be anemic while in the hospital   She did have some rectal bleed prior to hospitalization  At the time of discharge refused any anticoagulation  She has follow-up appointment scheduled with Cardiology in the next month  The following portions of the patient's history were reviewed and updated as appropriate:   Past Medical History:  She has a past medical history of Anxiety, Chronic pancreatitis ([facility] [address] ), Depression, Deviated nasal septum, H/O colonoscopy, Hyperlipidemia, Hypertension, Impaired fasting glucose, and Spinal stenosis  ,  _______________________________________________________________________  Medical Problems:  does not have any pertinent problems on file ,  _______________________________________________________________________  Past Surgical History:   has a past surgical history that includes Appendectomy; Cholecystectomy; Exploratory laparotomy; Tubal ligation; and IR port placement (11/2/2021)  ,  _______________________________________________________________________  Family History:  family history includes Coronary artery disease in her father; Multiple myeloma in her mother; Stroke in her father ,  _______________________________________________________________________  Social History:   reports that she has been smoking cigarettes  She has been smoking about 0 50 packs per day  She has never used smokeless tobacco  She reports that she does not drink alcohol and does not use drugs  ,  _______________________________________________________________________  Allergies:  is allergic to morphine     _______________________________________________________________________  Current Outpatient Medications   Medication Sig Dispense Refill    atorvastatin (LIPITOR) 20 mg tablet TAKE 1 TABLET EVERY DAY 90 tablet 3    diltiazem (CARDIZEM CD) 120 mg 24 hr capsule Take 1 capsule (120 mg total) by mouth daily 30 capsule 1    escitalopram (LEXAPRO) 20 mg tablet Take 1 tablet (20 mg total) by mouth daily 90 tablet 5    fluticasone (FLONASE) 50 mcg/act nasal spray 2 sprays into each nostril daily       MG tablet TAKE 1 TABLET (600 MG TOTAL) BY MOUTH 3 (THREE) TIMES A  tablet 3    metoprolol tartrate (LOPRESSOR) 25 mg tablet Take 1 tablet (25 mg total) by mouth every 12 (twelve) hours 60 tablet 1    ondansetron (ZOFRAN) 4 mg tablet Take 1 tablet (4 mg total) by mouth every 8 (eight) hours as needed for nausea or vomiting 30 tablet 0    oxyCODONE (ROXICODONE) 20 MG TABS Take 0 5-1 tablets (10-20 mg total) by mouth every 4 (four) hours as needed for moderate pain Max Daily Amount: 120 mg 90 tablet 0    pancrelipase, Lip-Prot-Amyl, (CREON) 12,000 units capsule Take 12,000 units of lipase by mouth 3 (three) times a day with meals 270 capsule 3    polyethylene glycol (MIRALAX) 17 g packet Take 17 g by mouth daily as needed (Constipation) 20 each 0    prochlorperazine (COMPAZINE) 10 mg tablet Take 1 tablet (10 mg total) by mouth every 6 (six) hours as needed for nausea or vomiting 30 tablet 0    senna (SENOKOT) 8 6 MG tablet Take 1 tablet (8 6 mg total) by mouth daily 30 tablet 0     No current facility-administered medications for this visit      _______________________________________________________________________  Review of Systems   Constitutional: Negative  Respiratory: Negative  Objective:  Vitals:    01/27/22 1456   BP: 152/50   Pulse: 68   Temp: 98 6 °F (37 °C)   SpO2: 98%   Weight: 50 3 kg (111 lb)   Height: 5' 6" (1 676 m)     Body mass index is 17 92 kg/m²  Physical Exam  Constitutional:       Appearance: She is well-developed  HENT:      Head: Normocephalic and atraumatic  Eyes:      Pupils: Pupils are equal, round, and reactive to light  Neck:      Thyroid: No thyromegaly  Cardiovascular:      Rate and Rhythm: Normal rate and regular rhythm  Heart sounds: Normal heart sounds  Pulmonary:      Effort: Pulmonary effort is normal       Breath sounds: Normal breath sounds  Abdominal:      Palpations: Abdomen is soft  Musculoskeletal:         General: Normal range of motion  Cervical back: Neck supple  Skin:     General: Skin is warm and dry  Neurological:      Mental Status: She is alert     Psychiatric:         Mood and Affect: Mood normal          Behavior: Behavior normal

## 2022-01-27 NOTE — ASSESSMENT & PLAN NOTE
After lengthy discussion regarding possible medications she is agreeable take aspirin 325 mg daily  Continue Cardizem  mg daily and Lopressor 25 mg b i d

## 2022-01-28 NOTE — PROGRESS NOTES
HEMATOLOGY / ONCOLOGY CLINIC NOTE    Primary Care Provider: Roxanne Quezada MD  Referring Provider: Chato Erazo  MRN: [id number]  : 1940    Reason for Encounter:    Chief Complaint   Patient presents with    Follow-up     Pancreatic adenocarcinoma          History of Hematology / Oncology Illness:     Erick Euceda is a 80 y o  female who came in  to establish care with oncology      1,pancreatic adenocarcinoma      presented with weight loss, imaging study showed 5 cm pancreatic body mass  Cancer marker CA 19-9 = 171;     - needle biopsy on 10/13/2021, results showed   suspicious for pancreatic adenocarcinoma  Cancer encasing SM[facility], not a candidate for surgery    - MRI did not show metastatic disease     - 2021:  Plan to start new adjuvant treatment  Gemcitabine 1000 mg / m2 /Abraxane 100 mg / m2 + onpro , day 1 day 15;  guardian test, no MSI high detected  - cycle 1 day 15 cancel due to elevation of alkaline phosphatase,? Due to alcohol intake  Restart treatment on  day 1 of cycle 2  After alkaline phosphatase improved  - cycle # 3: dose reduce to gemcitabine 800 mg/M2, Abraxane 80 mg/M2 day 1 day 15  - 2022:  Hospitalized for AFib with RVR, unclear etiology patient following cardiologist    Patient felt this is due to oral contrast   Will skip or contrast the future  - Cycle # 4: add Onpro  2, mother had history of multiple myeloma, maternal grandmother history of breast cancer  Assessment / Plan:        1  Pancreatic adenocarcinoma [facility])    Patient was recently hospitalized for AFib, doing okay now  Heart rates controlled  Patient and her son felt this is due to oral contrast   Will skip or contrast for future imaging    Review lab, cancer marker is trending down continuously  However CT scan showed the tumor is slightly smaller than before no fundamental change  No growth  Will continue current chemotherapy gem Abraxane    Patient does feel tired after each treatment however tolerable  Noticed a mild neutropenia will add Onpro for future treatments    Will follow patient in 4 weeks as scheduled           Made patient aware regarding side effects of chemotherapy, including but not limited to fatigue cytopenia, increased risk for infection, potential kidney, liver injuries neuropathies et al    Made patient aware to call MD or go to ED for any fever,  Chills, bleeding, easy bruise, unhealed wound, chest pain, abdominal pain et al                       minutes were spent face to face with patient with greater than 50% of the time spent in counseling or coordination of care including discussions of treatment instructions  All of the patient's questions were answered to their satisfactory during this discussion  Advised pt to call if there is any further questions  HPI:     10/18/2021 : 68y F, has no history of malignancy, active smoker, presented with weight loss, imaging showed pancreatic mass, refered to [facility] onc for further management  Pt reported lost 40 lb in 8m and also developed abd pain and poor appetite  No reported skin color change  She has been smoking 10 yr , 1/2 pack a day  She has family hx of malignancies; she has no cancer in the past      ECOG = 0       12/6/2021: Tolerated 1st treatment no major side effects; has fatigue for 2-3 days and improved  No abdominal pain  Patient was drinking alcohol until about 2 weeks ago    12/27/2021 :  Came for follow-up doing okay ; patient lost about 5 lb in the last months, also reported chemotherapy makes her very fatigued  Mild nausea  Still requires pain medicine  1/28/2022:  Patient came in for follow-up doing okay  Recently admitted to the hospital for AFib  Recovered    Body weight energy level at baseline       Problem list:       Patient Active Problem List   Diagnosis    DDD (degenerative disc disease), lumbar    Chronic pancreatitis ([facility] [address] )    Mixed hyperlipidemia    Allergic rhinitis    Impaired fasting glucose    Essential hypertension    Tobacco use    Osteoporosis    Reactive depression    Weight loss    Anemia    Drug-induced constipation    Anxiety    Spinal stenosis    [facility] adenocarcinoma [facility] [address] [facility]    Encounter for chemotherapy management    Mild protein-calorie malnutrition (HCC)    At high risk for infection due to chemotherapy    Port-A-Cath in place    Medical marijuana use    Cancer related pain    Palliative care patient    Atrial fibrillation with RVR (HCC)         PHYSICIAL EXAMINATION:     Vital Signs:   Pulse 65   Temp (!) 97 4 °F (36 3 °C)   Resp 16   Ht 5' 5 98" (1 676 m)   Wt 51 kg (112 lb 8 oz)   SpO2 99%   BMI 18 17 kg/m²   Body surface area is 1 57 meters squared  Ht Readings from Last 3 Encounters:   01/28/22 5' 5 98" (1 676 m)   01/27/22 5' 6" (1 676 m)   01/22/22 5' 6" (1 676 m)       Wt Readings from Last 3 Encounters:   01/28/22 51 kg (112 lb 8 oz)   01/27/22 50 3 kg (111 lb)   01/20/22 49 9 kg (110 lb)        Temp Readings from Last 3 Encounters:   01/28/22 (!) 97 4 °F (36 3 °C)   01/27/22 98 6 °F (37 °C)   01/23/22 98 5 °F (36 9 °C)        BP Readings from Last 3 Encounters:   01/27/22 152/50   01/23/22 119/67   01/19/22 128/80         Pulse Readings from Last 3 Encounters:   01/28/22 65   01/27/22 68   01/23/22 64         Appears tired, no main difference compared to previous visit         GEN: Alert, awake oriented x3, in no acute distress  HEENT- No pallor, icterus, cyanosis, no oral mucosal lesions,   LAD - no palpable cervical, clavicle, axillary, inguinal LAD  Heart- normal S1 S2, regular rate and rhythm, No murmur, rubs     Lungs- decreased breathing sound bilateral    Abdomen- soft, Non tender, bowel sounds present  Extremities- No cyanosis, clubbing, edema  Neuro- No focal neurological deficit           PAST MEDICAL HISTORY:   has a past medical history of Anxiety, Chronic pancreatitis ([facility] [address] ), Depression, Deviated nasal septum, H/O colonoscopy, Hyperlipidemia, Hypertension, Impaired fasting glucose, and Spinal stenosis  PAST SURGICAL HISTORY:   has a past surgical history that includes Appendectomy; Cholecystectomy; Exploratory laparotomy; Tubal ligation; and IR port placement (11/2/2021)  CURRENT MEDICATIONS:     Current Outpatient Medications   Medication Sig Dispense Refill    atorvastatin (LIPITOR) 20 mg tablet TAKE 1 TABLET EVERY DAY 90 tablet 3    diltiazem (CARDIZEM CD) 120 mg 24 hr capsule Take 1 capsule (120 mg total) by mouth daily 30 capsule 1    escitalopram (LEXAPRO) 20 mg tablet Take 1 tablet (20 mg total) by mouth daily 90 tablet 5    fluticasone (FLONASE) 50 mcg/act nasal spray 2 sprays into each nostril daily       MG tablet TAKE 1 TABLET (600 MG TOTAL) BY MOUTH 3 (THREE) TIMES A  tablet 3    metoprolol tartrate (LOPRESSOR) 25 mg tablet Take 1 tablet (25 mg total) by mouth every 12 (twelve) hours 60 tablet 1    ondansetron (ZOFRAN) 4 mg tablet Take 1 tablet (4 mg total) by mouth every 8 (eight) hours as needed for nausea or vomiting 30 tablet 0    oxyCODONE (ROXICODONE) 20 MG TABS Take 0 5-1 tablets (10-20 mg total) by mouth every 4 (four) hours as needed for moderate pain Max Daily Amount: 120 mg 90 tablet 0    pancrelipase, Lip-Prot-Amyl, (CREON) 12,000 units capsule Take 12,000 units of lipase by mouth 3 (three) times a day with meals 270 capsule 3    polyethylene glycol (MIRALAX) 17 g packet Take 17 g by mouth daily as needed (Constipation) 20 each 0    prochlorperazine (COMPAZINE) 10 mg tablet Take 1 tablet (10 mg total) by mouth every 6 (six) hours as needed for nausea or vomiting 30 tablet 0    senna (SENOKOT) 8 6 MG tablet Take 1 tablet (8 6 mg total) by mouth daily 30 tablet 0     No current facility-administered medications for this visit  [unfilled]    SOCIAL HISTORY:   reports that she has been smoking cigarettes  She has been smoking about 0 50 packs per day   She has never used smokeless tobacco  She reports that she does not drink alcohol and does not use drugs  FAMILY HISTORY:  family history includes Coronary artery disease in her father; Multiple myeloma in her mother; Stroke in her father  ALLERGIES:  is allergic to morphine  REVIEW OF SYSTEMS:  Please note that a 14-point review of systems was performed to include Constitutional, HEENT, Respiratory, CVS, GI, , Musculoskeletal, Integumentary, Neurologic, Rheumatologic, Endocrinologic, Psychiatric, Lymphatic, and Hematologic/Oncologic systems were reviewed and are negative unless otherwise stated in HPI  Positive and negative findings pertinent to this evaluation are incorporated into the history of present illness            Lab Results   Component Value Date    SODIUM 142 01/23/2022    K 4 4 01/23/2022     (H) 01/23/2022    CO2 23 01/23/2022    AGAP 9 01/23/2022    BUN 10 01/23/2022    CREATININE 0 67 01/23/2022    GLUC 103 01/23/2022    GLUF 122 (H) 09/10/2021    CALCIUM 8 5 01/23/2022    AST 20 01/21/2022    ALT 33 01/21/2022    ALKPHOS 93 01/21/2022    TP 4 3 (L) 01/21/2022    TBILI 0 19 (L) 01/21/2022    EGFR 83 01/23/2022       CBC with diff:   Results from last 7 days   Lab Units 01/23/22  0532 01/22/22  0606 01/21/22  2210 01/21/22  2121   WBC Thousand/uL 3 24* 3 18* 3 87* 3 40*   HEMOGLOBIN g/dL 9 2* 7 5* 7 3* 6 7*   HEMATOCRIT % 28 2* 23 3* 22 3* 19 8*   MCV fL 101* 101* 101* 102*   PLATELETS Thousands/uL 184 176 165 142*   MCH pg 33 0 32 6 33 2 34 5*   MCHC g/dL 32 6 32 2 32 7 33 8   RDW % 13 1 13 2 13 3 13 1   MPV fL 9 7 10 0 9 8 10 0   NRBC AUTO /100 WBCs  --  0  --   --        CMP:  Results from last 7 days   Lab Units 01/23/22  0532 01/22/22  0606 01/21/22  2209   POTASSIUM mmol/L 4 4 3 1* 3 3*   CHLORIDE mmol/L 110* 119* 115*   CO2 mmol/L 23 20* 21   BUN mg/dL 10 10 13   CREATININE mg/dL 0 67 0 46* 0 57*   CALCIUM mg/dL 8 5 6 3* 6 8*   AST U/L  --   --  20   ALT U/L  --   --  33   ALK PHOS U/L  -- --  93   EGFR ml/min/1 73sq  83 94 88       IMAGING:    No orders to display     CT chest w contrast    Result Date: 10/3/2021  Narrative: CT CHEST WITHOUT IV CONTRAST INDICATION:   D49 0: Neoplasm of unspecified behavior of digestive system  COMPARISON:  None  TECHNIQUE: CT examination of the chest was performed  Axial, sagittal, and coronal 2D reformatted images were created from the source data and submitted for interpretation  Radiation dose length product (DLP) for this visit:  238 mGy-cm   This examination, like all CT scans performed in the [facility], was performed utilizing techniques to minimize radiation dose exposure, including the use of iterative reconstruction and automated exposure control  IV Contrast:  85 mL of iohexol (OMNIPAQUE) was injected but infiltrated subcutaneously and no IV contrast visualized on CT scan  FINDINGS: LUNGS:  Biapical pleural-parenchymal scarring  Mild emphysema  Bibasilar subsegmental atelectasis/scarring  Bilateral upper lobe bronchial mucous plugging (series 3, image #33 and image #24)  PLEURA:  Unremarkable  HEART/GREAT VESSELS:  Atherosclerotic changes are noted in thoracic aorta and coronary arteries  MEDIASTINUM AND CED:  Unremarkable  CHEST WALL AND LOWER NECK:   Unremarkable  VISUALIZED STRUCTURES IN THE UPPER ABDOMEN:  Trace perihepatic ascites  Liver lesions with biliary ductal dilatation  See previous MRI for detail evaluation  OSSEOUS STRUCTURES:  There is a collection of hyperdense contrast in the medial left distal arm, consistent with infiltrated contrast      Impression: 1  No metastatic disease in the chest  2   Infiltrated contrast collecting in the soft tissues of the left medial distal arm  Recommend clinical follow-up  The study was marked in EPIC for significant notification   Workstation performed: [id number]     Endoscopic ultrasonography, GI (Upper)    Result Date: 10/13/2021  Narrative: [address] [place] [address] [facility] [phone] DATE OF SERVICE: 10/13/21 PHYSICIAN(S): Vickie Barlow MD - Attending Physician Nafisa Chamberlain DO - Fellow INDICATION: Pancreatic mass POST-OP DIAGNOSIS: See the impression below  PREPROCEDURE: Informed consent was obtained for the procedure, including sedation  Risks of perforation, hemorrhage, adverse drug reaction and aspiration were discussed  The patient was placed in the left lateral decubitus position  Patient was explained about the risks and benefits of the procedure  Risks including but not limited to bleeding, infection, and perforation were explained in detail  Also explained about less than 100% sensitivity with the exam and other alternatives  DETAILS OF PROCEDURE: Patient was taken to the procedure room where a time out was performed to confirm correct patient and correct procedure  The patient underwent monitored anesthesia care, which was administered by an anesthesia professional  The patient's blood pressure, heart rate, oxygen, respirations, level of consciousness and ETCO2 were monitored throughout the procedure  The linear scope was introduced through the mouth and advanced to the duodenum  The patient experienced no blood loss  The procedure was not difficult  The patient tolerated the procedure well  There were no apparent complications  ANESTHESIA INFORMATION: ASA: II Anesthesia Type: IV Sedation with Anesthesia MEDICATIONS: No administrations occurring from 0943 to 1043 on 10/13/21 FINDINGS: Single irregular and heterogeneous mass measuring 35 mm x 32 mm with poorly defined margins in the body of the pancreas  Apparent abutment of the splenic vein and portal confluence; 5 fine needle biopsy passes were taken with a 25 gauge Sharkcore needle using a transgastric approach guided by Doppler and performed cytology analysis   Onsite cytologist was present and cytology results were preliminarily atypical Aneurysmal dilatation of infrarenal abdominal aorta  Normal celiac take-off  The left kidney and spleen appeared normal  The liver appeared normal  The common bile duct appeared normal  Normal pancreatic duct  The esophagus, stomach and duodenum appeared normal  Limited evaluation with echoendoscope was grossly normal  SPECIMENS: ID Type Source Tests Collected by Time Destination 1 : pancreatic body mass FNA Tissue FNA FINE NEEDLE ASPIRATION Christophe Sifuentes MD 10/13/2021 10:13 AM       Impression: Heterogenous, ill-defined mass in the body of the pancreas measuring approximately 3 5 x 3 2 cm with abutment of the portosplenic confluence and splenic vein  Core biopsies obtained using 25 gauge Sharkcore needle  On-site cytology preliminary revealed atypical cells  Aneurysmal dilatation of the infrarenal aorta  RECOMMENDATION: Await pathology results  Follow-up with Dr Constanza Heller by phone in 1-2 weeks  Check CEA and CA 19-9 levels  Resume diet and medications  Stable for discharge once standard parameters are met  Please call the office at [phone] if you experience any fevers, chills, intractable nausea & vomiting, or worsening abdominal pain  If you are unable to get in contact with a provider, please go to the closest ER for evaluation  You may also call the office if you have any questions or concerns regarding your procedure  ATTENDING ATTESTATION:  I was present throughout the entire procedure from insertion to complete withdrawal of the scope  I performed all interventions myself or oversaw the fellow    Christophe Sifuentes MD

## 2022-01-28 NOTE — PROGRESS NOTES
Pt presents for port a cath flush  Pt offers no complaints  Port accessed, flushed, [employer], saline locked and de-accessed without difficulty  AVS declined, Next appointment reviewed

## 2022-01-31 NOTE — PROGRESS NOTES
Patient presents for chemotherapy offering no complaints  Port accessed, patient premedicated, and chemotherapy infusing at this time  Call bell in reach  Will continue to monitor

## 2022-01-31 NOTE — PROGRESS NOTES
Pt tolerated the rest of infusion without incident  neulasta onPro placed on the upper right arm  Aware to take it off 2/1 at 9 pm   Port flushed and de-accessed  AVS given  Walked out in stable condition

## 2022-01-31 NOTE — TELEPHONE ENCOUNTER
I phoned the patient and was able to speak with her  to discuss that the patient's 2/25 appointment with Dr Malaika Barber would need to be rescheduled to 3/3 at 1100 with Carlos Finn PA-C, as Dr Malaika Barber would not be in the office on 2/25  The patient's  was agreeable to this change and will update the patient

## 2022-01-31 NOTE — PATIENT INSTRUCTIONS
Nutrition Rx & Recommendations:  · Diet: High Calorie, High Protein (for high calorie foods see pages 52-53, and for high protein foods see pages 49-51 in your Eating Hints book)  · For constipation: drink plenty of fluids (>64 oz/day); drink hot liquids; eat high-fiber foods as tolerated (whole grains, beans, peas, nuts, seeds, fruits, vegetables, etc); increase physical activity as tolerated  Avoid increasing fiber intake too quickly, add fiber into your diet slowly; keep a record of your bowel movements (see pages 13-14 in you Eating Hints book)  · Small, frequent snacks may be easier to tolerate than 3 large daily meals  Aim for 5-6 small meals per day; eat every 2-3 hours  · Include protein at all meals/snacks  · Avoid high-fat, greasy or fried foods  · Avoid high sugar foods (ex  >10 grams of sugar per serving)  · Include a variety of foods (as tolerated/allowed by diet)  · Stay hydrated by sipping fluids of choice/tolerance throughout the day, (48-64 oz  Per day)  Avoid carbonated beverages  · Liquid nutrition may be better tolerated than solids at times  · Alter food choices and eating patterns to accommodate changing needs  · Light physical activity (such as walking) is encouraged, as able/tolerated  · Refer to Eating Hints book and Diet and Nutrition book for other meal/snack ideas and symptom management  · Weigh yourself regularly  If you notice weight loss, make an effort to increase your daily food/calorie intake  If you continue to notice loss after these efforts, reach out to your dietitian to establish a plan to stabilize weight    · High calorie foods to try: peanut butter, cream soups, butter, cheese  (see pages 52-53 in your Eating Hints book)    Follow Up Plan: During infusion 2/14/22  Recommend Referral to Other Providers: none at this time

## 2022-01-31 NOTE — PROGRESS NOTES
Outpatient Oncology Nutrition Consultation   Type of Consult: Follow Up  Care Location: [facility]    Reason for referral: Palliative Care on 12/7/21 (reason for referral: pancreatic cancer and weight loss)  Nutrition Assessment:   Oncology Diagnosis & Treatments: Diagnosed with pancreatic cancer 10/25/21  Began chemotherapy (neulasta, abraxane, gemzar) 11/8/21     Oncology History   Pancreatic adenocarcinoma ([facility] )   10/25/2021 Initial Diagnosis    Pancreatic adenocarcinoma ([facility] )     11/8/2021 -  Chemotherapy    pegfilgrastim (NEULASTA ONPRO), 6 mg, Subcutaneous, Once, 3 of 5 cycles  Administration: 6 mg (11/8/2021), 6 mg (12/6/2021)  paclitaxel protein-bound (ABRAXANE) IVPB, 100 mg/m2 = 157 mg (80 % of original dose 125 mg/m2), Intravenous, Once, 4 of 6 cycles  Dose modification: 100 mg/m2 (original dose 125 mg/m2, Cycle 1, Reason: Performance Status), 80 mg/m2 (original dose 125 mg/m2, Cycle 3, Reason: Performance Status)  Administration: 157 mg (11/8/2021), 157 mg (12/6/2021), 157 mg (12/20/2021), 126 mg (1/3/2022), 126 mg (1/17/2022)  gemcitabine (GEMZAR) infusion, 1,000 mg/m2 = 1,570 mg, Intravenous, Once, 4 of 6 cycles  Dose modification: 800 mg/m2 (original dose 1,000 mg/m2, Cycle 3, Reason: Performance Status)  Administration: 1,570 mg (11/8/2021), 1,570 mg (12/6/2021), 1,570 mg (12/20/2021), 1,255 9 mg (1/3/2022), 1,255 9 mg (1/17/2022)       Past Medical & Surgical Hx:   Patient Active Problem List   Diagnosis    DDD (degenerative disc disease), lumbar    Chronic pancreatitis ([facility] )    Mixed hyperlipidemia    Allergic rhinitis    Impaired fasting glucose    Essential hypertension    Tobacco use    Osteoporosis    Reactive depression    Weight loss    Anemia    Drug-induced constipation    Anxiety    Spinal stenosis    Pancreatic adenocarcinoma ([facility] )    Encounter for chemotherapy management    Mild protein-calorie malnutrition ([facility] )    At high risk for infection due to chemotherapy  Port-A-Cath in place    Medical marijuana use    Cancer related pain    Palliative care patient    [facility] fibrillation [facility] RVR ([facility] )     Past Medical History:   Diagnosis Date    Anxiety     [facility]     Depression     Deviated nasal septum     H/O colonoscopy     6/7/10    Hyperlipidemia     Hypertension     Impaired fasting glucose     Spinal stenosis      Past Surgical History:   Procedure Laterality Date    APPENDECTOMY      CHOLECYSTECTOMY      EXPLORATORY LAPAROTOMY      IR PORT PLACEMENT  11/2/2021    TUBAL LIGATION         Review of Medications:   Vitamins, Supplements and Herbals: No, pt denies taking supplements    Current Outpatient Medications:     atorvastatin (LIPITOR) 20 mg tablet, TAKE 1 TABLET EVERY DAY, Disp: 90 tablet, Rfl: 3    diltiazem (CARDIZEM CD) 120 mg 24 hr capsule, Take 1 capsule (120 mg total) by mouth daily, Disp: 30 capsule, Rfl: 1    escitalopram (LEXAPRO) 20 mg tablet, Take 1 tablet (20 mg total) by mouth daily, Disp: 90 tablet, Rfl: 5    fluticasone (FLONASE) 50 mcg/act nasal spray, 2 sprays into each nostril daily, Disp: , Rfl:      MG tablet, TAKE 1 TABLET (600 MG TOTAL) BY MOUTH 3 (THREE) TIMES A DAY, Disp: 270 tablet, Rfl: 3    metoprolol tartrate (LOPRESSOR) 25 mg tablet, Take 1 tablet (25 mg total) by mouth every 12 (twelve) hours, Disp: 60 tablet, Rfl: 1    ondansetron (ZOFRAN) 4 mg tablet, Take 1 tablet (4 mg total) by mouth every 8 (eight) hours as needed for nausea or vomiting, Disp: 30 tablet, Rfl: 0    oxyCODONE (ROXICODONE) 20 MG TABS, Take 0 5-1 tablets (10-20 mg total) by mouth every 4 (four) hours as needed for moderate pain Max Daily Amount: 120 mg, Disp: 90 tablet, Rfl: 0    pancrelipase, Lip-Prot-Amyl, (CREON) 12,000 units capsule, Take 12,000 units of lipase by mouth 3 (three) times a day with meals, Disp: 270 capsule, Rfl: 3    polyethylene glycol (MIRALAX) 17 g packet, Take 17 g by mouth daily as needed (Constipation), Disp: 20 each, Rfl: 0    prochlorperazine (COMPAZINE) 10 mg tablet, Take 1 tablet (10 mg total) by mouth every 6 (six) hours as needed for nausea or vomiting, Disp: 30 tablet, Rfl: 0    senna (SENOKOT) 8 6 MG tablet, Take 1 tablet (8 6 mg total) by mouth daily, Disp: 30 tablet, Rfl: 0  No current facility-administered medications for this visit      Facility-Administered Medications Ordered in Other Visits:     dexamethasone (DECADRON) 10 mg in sodium chloride 0 9 % 51 mL IVPB, 10 mg, Intravenous, Once, Ana Vinson MD PhD    gemcitabine [facility]) 1,255 9 mg in sodium chloride 0 9 % 250 mL infusion, 800 mg/m2 (Treatment Plan Recorded), Intravenous, Once, Ana Vinson MD PhD    PACLitaxel protein bound (ABRAXANE) 126 mg in IVPB 25 2 mL, 80 mg/m2 (Treatment Plan Recorded), Intravenous, Once, Ana Vinson MD PhD    pegfilgrastim ([place] [employer]) subcutaneous injection kit 6 mg, 6 mg, Subcutaneous, Once, Ana Vinson MD PhD    sodium chloride 0 9 % infusion, 20 mL/hr, Intravenous, Once, Ana Vinson MD PhD    Most Recent Lab Results:   Lab Results   Component Value Date    WBC 8 22 01/28/2022    NEUTROABS 5 81 01/28/2022    CHOLESTEROL 136 09/10/2021    TRIG 58 09/10/2021    HDL 66 09/10/2021    LDLCALC 58 09/10/2021    ALT 84 (H) 01/28/2022    AST 23 01/28/2022    ALB 3 4 (L) 01/28/2022    SODIUM 137 01/28/2022    SODIUM 142 01/23/2022    K 4 1 01/28/2022    K 4 4 01/23/2022     01/28/2022    BUN 14 01/28/2022    BUN 10 01/23/2022    CREATININE 0 83 01/28/2022    CREATININE 0 67 01/23/2022    EGFR 67 01/28/2022    GLUF 122 (H) 09/10/2021    GLUF 130 (H) 02/16/2021    GLUC 127 01/28/2022    HGBA1C 5 9 (H) 09/10/2021    HGBA1C 5 9 (H) 02/16/2021    HGBA1C 5 8 04/16/2019    CALCIUM 8 9 01/28/2022    MG 1 9 01/20/2022       Anthropometric Measurements:   Height: 66"  Ht Readings from Last 1 Encounters:   01/31/22 5' 5 98" (1 676 m)     Wt Readings from Last 20 Encounters:   01/31/22 50 kg (110 lb 3 2 oz) 01/28/22 51 kg (112 lb 8 oz)   01/27/22 50 3 kg (111 lb)   01/20/22 49 9 kg (110 lb)   01/19/22 50 3 kg (111 lb)   01/17/22 49 7 kg (109 lb 9 6 oz)   01/03/22 48 kg (105 lb 12 8 oz)   12/27/21 47 6 kg (105 lb)   12/20/21 49 7 kg (109 lb 9 1 oz)   12/07/21 50 8 kg (112 lb)   12/06/21 50 6 kg (111 lb 8 8 oz)   12/06/21 50 3 kg (111 lb)   11/26/21 49 9 kg (110 lb)   11/09/21 50 7 kg (111 lb 12 8 oz)   11/08/21 50 2 kg (110 lb 10 7 oz)   11/02/21 49 6 kg (109 lb 5 6 oz)   11/02/21 49 4 kg (109 lb)   10/25/21 51 3 kg (113 lb)   10/18/21 51 7 kg (114 lb)   10/13/21 52 2 kg (115 lb)       Weight History:    Usual Weight: 145#    Oncology Nutrition-Anthropometrics      Nutrition from 1/31/2022 in [address] [place] [address] Nutrition from 1/17/2022 in [place] [address] Oncology Dietitian [place]   Patient age (years): 80 years 80 years   Patient (female) height (in): 77 in 77 in   Current Weight to be used for anthropometric calculations (lbs) 110 2 lbs 109 6 lbs   Current Weight to be used for anthropometric calculations (kg) 50 1 kg 49 8 kg   BMI: 17 8 17 7   IBW female: 130 lbs 130 lbs   IBW (kg) female: 59 1 kg 59 1 kg   IBW % (female) 84 8 % 84 3 %   Adjusted BW (female): 125 lbs 124 9 lbs   Adjusted BW kg (female): 56 8 kg 56 8 kg   % weight change after 1 week: 0 2 % --   Weight change after 1 week (lbs) 0 2 lbs --   % weight change after 1 month: 5 % 0 %   Weight change after 1 month (lbs) 5 2 lbs 0 lbs   % weight change after 3 months: -2 5 % -3 9 %   Weight change after 3 months (lbs) -2 8 lbs -4 4 lbs          Nutrition-Focused Physical Findings: severe muscle depletion ([place]) - hollowing/scooping/depression    Food/Nutrition-Related History & Client/Social History:    Current Nutrition Impact Symptoms:  [] Nausea -has zofran  [x] Reduced Appetite -improved during second week after chemo  [] Acid Reflux    [] Vomiting  [x] Unintended Wt Loss  [] Malabsorption    [] Diarrhea  [] Unintended Wt Gain [] Dumping Syndrome    [] Constipation -takes Miralax every other day [] Thick Mucous/Secretions  [x] Abdominal Pain   -with food intake  -takes creon    [x] Dysgeusia (Altered Taste) -starts to improve during second week after chemo  [] Xerostomia (Dry Mouth)  [] Gas    [x] Dysosmia (Altered Smell)  [] Thrush  [] Difficulty Chewing    [] Oral Mucositis (Sore Mouth)  [x] Fatigue  [x] Hyperglycemia -hba1c 5 9% 9/10/21   [] Odynophagia  [] Esophagitis  [] Other:    [] Dysphagia  [] Early Satiety  [] No Problems Eating      Food Allergies & Intolerances: no    Current Diet: Regular Diet, No Restrictions  Current Nutrition Intake: Increased since last visit  Appetite: Fair   Nutrition Route: PO  Oral Care: brushes BID  Activity level: ADL, goes to work 2 days a week      25 Hr Diet Recall:   Breakfast: oatmeal made with whole milk and butter, sweetened with monkfruit or cheerios   Snack: cookies or chocolate candies OR peanut butter   Lunch: usually has to force herself to eat something, will eat toast or PB sandwich  Dinner: shake and baked pork chops, and mashed potatoes; will have fish 2 nights/ week   Snack: blueberries     Beverages: iced tea mixed with bottled water, coffee (12oz x1)   Supplements:    None      Oncology Nutrition-Estimated Needs      Nutrition from 2022 in [address] [place] [address] Nutrition from 2022 in [place] [address] Oncology Dietitian [place]   Weight type used IBW IBW   Weight in kilograms (kg) used for estimated needs 59 1 kg 59 1 kg   Energy needs formula:  30-35 kcal/kg 30-35 kcal/kg   Energy needs based on 30 kcal/k kcal 1773 kcal   Energy needs based on 35 kcal/k kcal 2068 kcal   Protein needs formula: 1 2-1 5 g/kg 1 2-1 5 g/kg   Protein needs based on 1 2 g/k g 71 g   Protein needs based on 1 5 g/kg 89 g 89 g   Fluid needs formula: 30-35 mL/kg 30-35 mL/kg   Fluid needs based on 30 mL/kg 1773 mL 1773 mL   Fluid needs in ounces 60 oz 60 oz Fluid needs based on 35 mL/kg 2069 mL 2069 mL   Fluid needs in ounces 70 oz 70 oz           Discussion & Intervention:   Clarnece Zamarripa was evaluated today for an RD follow up regarding wt loss and poor po intake  Clarence Zamarripa is currently undergoing tx for pancreatic cancer  Today Clarence Zamarripa explains that she is doing okay, except for being recently hospitalized with an increased heart rate  She states that her appetite is improving at this time  Her weight has increased over the past month  Provided recipes from the [facility] for her to try  She is appreciative of conversation and recipes  Will follow up at infusion in 2 weeks  Moving forward, Clarence Zamarripa will continue current nutrition plan of care  Materials Provided: Recipes: various recipes from [facility] website   All questions and concerns addressed during todays visit  Clarence Zamarripa has RD contact information  Nutrition Diagnosis:    Increased Nutrient Needs (kcal & pro) related to increased demand for nutrients and disease state as evidenced by cancer dx and pt undergoing tx for cancer   Underweight related to physiological causes as evidenced by BMI 17 8  Monitoring & Evaluation:   Goals:  · weight maintenance/stabilization  · adequate nutrition impact symptom management  · pt to meet >/=75% estimated nutrition needs daily    · Progress Towards Goals: Progressing    Nutrition Rx & Recommendations:  · Diet: High Calorie, High Protein (for high calorie foods see pages 52-53, and for high protein foods see pages 49-51 in your Eating Hints book)  · For constipation: drink plenty of fluids (>64 oz/day); drink hot liquids; eat high-fiber foods as tolerated (whole grains, beans, peas, nuts, seeds, fruits, vegetables, etc); increase physical activity as tolerated  Avoid increasing fiber intake too quickly, add fiber into your diet slowly; keep a record of your bowel movements (see pages 13-14 in you Eating Hints book)    · Small, frequent snacks may be easier to tolerate than 3 large daily meals  Aim for 5-6 small meals per day; eat every 2-3 hours  · Include protein at all meals/snacks  · Avoid high-fat, greasy or fried foods  · Avoid high sugar foods (ex  >10 grams of sugar per serving)  · Include a variety of foods (as tolerated/allowed by diet)  · Stay hydrated by sipping fluids of choice/tolerance throughout the day, (48-64 oz  Per day)  Avoid carbonated beverages  · Liquid nutrition may be better tolerated than solids at times  · Alter food choices and eating patterns to accommodate changing needs  · Light physical activity (such as walking) is encouraged, as able/tolerated  · Refer to Eating Hints book and Diet and Nutrition book for other meal/snack ideas and symptom management  · Weigh yourself regularly  If you notice weight loss, make an effort to increase your daily food/calorie intake  If you continue to notice loss after these efforts, reach out to your dietitian to establish a plan to stabilize weight    · High calorie foods to try: peanut butter, cream soups, butter, cheese  (see pages 52-53 in your Eating Hints book)    Follow Up Plan: During infusion 2/14/22  Recommend Referral to Other Providers: none at this time

## 2022-02-02 NOTE — PROGRESS NOTES
Spoke with patient this afternoon who confirmed appt tomorrow for holter monitor  Patient with several questions pertaining to holter monitor (how long will it be worn, does insurance cover, can she shower)  CM encouraged her to ask questions at her appt tomorrow as all directions should be provided to her then  Patient reports compliance with all her medications  At time of call, she offers no complaints and took down call back number of this ERIC

## 2022-02-07 NOTE — PROGRESS NOTES
Assessment/Plan:    Return visit in 1 month     Problem List Items Addressed This Visit        Digestive    Pancreatic adenocarcinoma [facility]) - Primary     Follow-up with Oncology  Cardiovascular and Mediastinum    Atrial fibrillation with RVR (HCC)     Continue Cardizem  mg daily and Lopressor 25 mg b i d  Other    Mixed hyperlipidemia     Continue Lipitor 20 mg daily           Other Visit Diagnoses     Edema, unspecified type        Relevant Medications    furosemide (LASIX) 20 mg tablet    Other Relevant Orders    Basic metabolic panel            Subjective:      Patient ID: Nemesio Larose is a 80 y o  female  Patient is brought in by her son for checkup  Her atrial fibrillation seems quiet at this time she is not have any palpitations  Her appetite is fair and she has gained few lb since last visit  She complains of bilateral ankle edema  The following portions of the patient's history were reviewed and updated as appropriate:   Past Medical History:  She has a past medical history of Anxiety, Chronic pancreatitis ([address] ), Depression, Deviated nasal septum, H/O colonoscopy, Hyperlipidemia, Hypertension, Impaired fasting glucose, and Spinal stenosis  ,  _______________________________________________________________________  Medical Problems:  does not have any pertinent problems on file ,  _______________________________________________________________________  Past Surgical History:   has a past surgical history that includes Appendectomy; Cholecystectomy; Exploratory laparotomy; Tubal ligation; and IR port placement (11/2/2021)  ,  _______________________________________________________________________  Family History:  family history includes Coronary artery disease in her father; Multiple myeloma in her mother; Stroke in her father ,  _______________________________________________________________________  Social History:   reports that she has been smoking cigarettes   She has been smoking about 0 50 packs per day  She has never used smokeless tobacco  She reports that she does not drink alcohol and does not use drugs  ,  _______________________________________________________________________  Allergies:  is allergic to morphine     _______________________________________________________________________  Current Outpatient Medications   Medication Sig Dispense Refill    atorvastatin (LIPITOR) 20 mg tablet TAKE 1 TABLET EVERY DAY 90 tablet 3    diltiazem (CARDIZEM CD) 120 mg 24 hr capsule Take 1 capsule (120 mg total) by mouth daily 30 capsule 1    escitalopram (LEXAPRO) 20 mg tablet Take 1 tablet (20 mg total) by mouth daily 90 tablet 5    fluticasone (FLONASE) 50 mcg/act nasal spray 2 sprays into each nostril daily       MG tablet TAKE 1 TABLET (600 MG TOTAL) BY MOUTH 3 (THREE) TIMES A  tablet 3    metoprolol tartrate (LOPRESSOR) 25 mg tablet Take 1 tablet (25 mg total) by mouth every 12 (twelve) hours 60 tablet 1    ondansetron (ZOFRAN) 4 mg tablet Take 1 tablet (4 mg total) by mouth every 8 (eight) hours as needed for nausea or vomiting 30 tablet 0    oxyCODONE (ROXICODONE) 20 MG TABS Take 0 5-1 tablets (10-20 mg total) by mouth every 4 (four) hours as needed for moderate pain Max Daily Amount: 120 mg 90 tablet 0    pancrelipase, Lip-Prot-Amyl, (CREON) 12,000 units capsule Take 12,000 units of lipase by mouth 3 (three) times a day with meals 270 capsule 3    polyethylene glycol (MIRALAX) 17 g packet Take 17 g by mouth daily as needed (Constipation) 20 each 0    prochlorperazine (COMPAZINE) 10 mg tablet Take 1 tablet (10 mg total) by mouth every 6 (six) hours as needed for nausea or vomiting 30 tablet 0    senna (SENOKOT) 8 6 MG tablet Take 1 tablet (8 6 mg total) by mouth daily 30 tablet 0    furosemide (LASIX) 20 mg tablet 1 bid prn edema 60 tablet 5     No current facility-administered medications for this visit  _______________________________________________________________________  Review of Systems   Constitutional: Negative  Respiratory: Negative  Cardiovascular: Positive for leg swelling  Gastrointestinal: Negative  Neurological: Positive for weakness  Objective:  Vitals:    02/07/22 1025   BP: 108/70   BP Location: Left arm   Patient Position: Sitting   Cuff Size: Adult   Pulse: 70   Temp: 98 8 °F (37 1 °C)   SpO2: 98%   Weight: 50 8 kg (112 lb)   Height: 5' 5 98" (1 676 m)     Body mass index is 18 09 kg/m²  Physical Exam  Constitutional:       Appearance: Normal appearance  She is well-developed  HENT:      Head: Normocephalic and atraumatic  Left Ear: External ear normal    Eyes:      Pupils: Pupils are equal, round, and reactive to light  Neck:      Thyroid: No thyromegaly  Cardiovascular:      Rate and Rhythm: Normal rate and regular rhythm  Heart sounds: Normal heart sounds  Pulmonary:      Effort: Pulmonary effort is normal       Breath sounds: Normal breath sounds  Abdominal:      Palpations: Abdomen is soft  Musculoskeletal:      Cervical back: Neck supple  Comments: Bilateral 1+ pretibial edema  Lymphadenopathy:      Cervical: No cervical adenopathy  Skin:     General: Skin is warm and dry  Neurological:      Mental Status: She is alert and oriented to person, place, and time

## 2022-02-11 NOTE — PROGRESS NOTES
Pt presents for labs offering no complaints  Labs obtained via port without difficulty  Port flushed per protocol  AVS declined  Pt discharged in stable condition

## 2022-02-11 NOTE — TELEPHONE ENCOUNTER
Received call, critical WBC of [phone] from today  Patient received Neulasta on 1/31/22  Next treatment scheduled for Monday 2/14/22  Call placed to patient, she denied any fever, chills or sign of infection  Pt stated she feels good, like her normal self  Pt reported she did see her PCP on 2/7/2022 who placed her on lasix for B/L LE edema  Pt stated swelling has decreased since starting the medication and is elevating her legs throughout the day  Cristiana Dye, patient is scheduled to see Dr Georges Starkey on 3/25/2022, NOHEMY from Dr Margot Blair  Can you please review the above information and critical result with Dr Georges Starkey

## 2022-02-14 NOTE — PROGRESS NOTES
Pt to clinic for abraxane, gemzar, and Neulasta on-pro, confirmed with Adrienne Lopez RN pt is to still receive Neualsta today with elevated WBC and ANC, Angel Bustamante also made aware of pt's elevated alk phos, no interventions at this time, pt offers no complaints today, tolerated abraxane and currently tolerating gemzar without complications, report given to Cale Molina RN, will continue to monitor

## 2022-02-14 NOTE — PROGRESS NOTES
Pt tolerated treatment without incident, nuelasta placed on L arm and blinking green, port flushed per protocol pt discharged in stable condition

## 2022-02-14 NOTE — PROGRESS NOTES
Outpatient Oncology Nutrition Consultation   Type of Consult: Follow Up  Care Location: [facility]    Reason for referral: Palliative Care on 12/7/21 (reason for referral: pancreatic cancer and weight loss)  Nutrition Assessment:   Oncology Diagnosis & Treatments: Diagnosed with pancreatic cancer 10/25/21  Began chemotherapy (neulasta, abraxane, gemzar) 11/8/21     Oncology History   Pancreatic adenocarcinoma ([facility] )   10/25/2021 Initial Diagnosis    Panc[facility] ([facility] )     11/8/2021 -  Chemotherapy    pegfilgrastim (NEULASTA ONPRO), 6 mg, Subcutaneous, Once, 3 of 5 cycles  Administration: 6 mg (11/8/2021), 6 mg (12/6/2021), 6 mg (1/31/2022)  paclitaxel protein-bound (ABRAXANE) IVPB, 100 mg/m2 = 157 mg (80 % of original dose 125 mg/m2), Intravenous, Once, 4 of 6 cycles  Dose modification: 100 mg/m2 (original dose 125 mg/m2, Cycle 1, Reason: Performance Status), 80 mg/m2 (original dose 125 mg/m2, Cycle 3, Reason: Performance Status)  Administration: 157 mg (11/8/2021), 157 mg (12/6/2021), 157 mg (12/20/2021), 126 mg (1/3/2022), 126 mg (1/17/2022), 126 mg (1/31/2022)  gemcitabine (GEMZAR) infusion, 1,000 mg/m2 = 1,570 mg, Intravenous, Once, 4 of 6 cycles  Dose modification: 800 mg/m2 (original dose 1,000 mg/m2, Cycle 3, Reason: Performance Status)  Administration: 1,570 mg (11/8/2021), 1,570 mg (12/6/2021), 1,570 mg (12/20/2021), 1,255 9 mg (1/3/2022), 1,255 9 mg (1/17/2022), 1,255 9 mg (1/31/2022)       Past Medical & Surgical Hx: tobacco use, HTN, HLD   Patient Active Problem List   Diagnosis    DDD (degenerative disc disease), lumbar    Chronic pancreatitis ([facility] )    Mixed hyperlipidemia    Allergic rhinitis    Impaired fasting glucose    Essential hypertension    Tobacco use    Osteoporosis    Reactive depression    Weight loss    Anemia    Drug-induced constipation    Anxiety    Spinal stenosis    Pancreatic adenocarcinoma ([address] )    Encounter for chemotherapy management    Mild protein-calorie malnutrition ([facility] )    At high risk for infection due to chemotherapy    Port-A-Cath in place    Medical marijuana use    Cancer related pain    Palliative care patient    Atrial fibrillation with RVR ([place] [id number] )     Past Medical History:   Diagnosis Date    Anxiety     Chronic pancreatitis ([facility] )     Depression     Deviated nasal septum     H/O colonoscopy     6/7/10    Hyperlipidemia     Hypertension     Impaired fasting glucose     Spinal stenosis      Past Surgical History:   Procedure Laterality Date    APPENDECTOMY      CHOLECYSTECTOMY      EXPLORATORY LAPAROTOMY      IR PORT PLACEMENT  11/2/2021    TUBAL LIGATION         Review of Medications:   Vitamins, Supplements and Herbals: No, pt denies taking supplements    Current Outpatient Medications:     atorvastatin (LIPITOR) 20 mg tablet, TAKE 1 TABLET EVERY DAY, Disp: 90 tablet, Rfl: 3    diltiazem (CARDIZEM CD) 120 mg 24 hr capsule, Take 1 capsule (120 mg total) by mouth daily, Disp: 30 capsule, Rfl: 1    escitalopram (LEXAPRO) 20 mg tablet, Take 1 tablet (20 mg total) by mouth daily, Disp: 90 tablet, Rfl: 5    fluticasone (FLONASE) 50 mcg/act nasal spray, 2 sprays into each nostril daily, Disp: , Rfl:     furosemide (LASIX) 20 mg tablet, 1 bid prn edema, Disp: 60 tablet, Rfl: 5     MG tablet, TAKE 1 TABLET (600 MG TOTAL) BY MOUTH 3 (THREE) TIMES A DAY, Disp: 270 tablet, Rfl: 3    metoprolol tartrate (LOPRESSOR) 25 mg tablet, Take 1 tablet (25 mg total) by mouth every 12 (twelve) hours, Disp: 60 tablet, Rfl: 1    ondansetron (ZOFRAN) 4 mg tablet, Take 1 tablet (4 mg total) by mouth every 8 (eight) hours as needed for nausea or vomiting, Disp: 30 tablet, Rfl: 0    oxyCODONE (ROXICODONE) 20 MG TABS, Take 0 5-1 tablets (10-20 mg total) by mouth every 4 (four) hours as needed for moderate pain Max Daily Amount: 120 mg, Disp: 90 tablet, Rfl: 0    pancrelipase, Lip-Prot-Amyl, (CREON) 12,000 units capsule, Take 12,000 units of lipase by mouth 3 (three) times a day with meals, Disp: 270 capsule, Rfl: 3    polyethylene glycol (MIRALAX) 17 g packet, Take 17 g by mouth daily as needed (Constipation), Disp: 20 each, Rfl: 0    prochlorperazine (COMPAZINE) 10 mg tablet, Take 1 tablet (10 mg total) by mouth every 6 (six) hours as needed for nausea or vomiting, Disp: 30 tablet, Rfl: 0    senna (SENOKOT) 8 6 MG tablet, Take 1 tablet (8 6 mg total) by mouth daily, Disp: 30 tablet, Rfl: 0  No current facility-administered medications for this visit      Facility-Administered Medications Ordered in Other Visits:     dexamethasone (DECADRON) 10 mg in sodium chloride 0 9 % 51 mL IVPB, 10 mg, Intravenous, Once, Edilberto Jiménez MD, Last Rate: 153 mL/hr at 02/14/22 1357, 10 mg at 02/14/22 1357    gemcitabine (GEMZAR) 1,255 9 mg in sodium chloride 0 9 % 250 mL infusion, 800 mg/m2 (Treatment Plan Recorded), Intravenous, Once, Edilberto Jiménez MD    PACLitaxel protein bound (ABRAXANE) 126 mg in IVPB 25 2 mL, 80 mg/m2 (Treatment Plan Recorded), Intravenous, Once, Edilberto Jiménez MD    pegfilgrastim (NEULASTA ONPRO) subcutaneous injection kit 6 mg, 6 mg, Subcutaneous, Once, Edilberto Jiménez MD    Most Recent Lab Results:   Lab Results   Component Value Date    WBC 37 73 (HH) 02/11/2022    NEUTROABS 5 81 01/28/2022    CHOLESTEROL 136 09/10/2021    TRIG 58 09/10/2021    HDL 66 09/10/2021    LDLCALC 58 09/10/2021    ALT 45 02/11/2022    AST 22 02/11/2022    ALB 3 5 02/11/2022    SODIUM 141 02/11/2022    SODIUM 137 01/28/2022    K 3 3 (L) 02/11/2022    K 4 1 01/28/2022     02/11/2022    BUN 14 02/11/2022    BUN 14 01/28/2022    CREATININE 0 84 02/11/2022    CREATININE 0 83 01/28/2022    EGFR 66 02/11/2022    GLUF 122 (H) 09/10/2021    GLUF 130 (H) 02/16/2021    GLUC 121 02/11/2022    HGBA1C 5 9 (H) 09/10/2021    HGBA1C 5 9 (H) 02/16/2021    HGBA1C 5 8 04/16/2019    CALCIUM 8 6 02/11/2022    MG 1 9 01/20/2022       Anthropometric Measurements: Height: 66"  Ht Readings from Last 1 Encounters:   02/14/22 5' 5 98" (1 676 m)     Wt Readings from Last 20 Encounters:   02/14/22 51 8 kg (114 lb 3 2 oz)   02/07/22 50 8 kg (112 lb)   01/31/22 50 kg (110 lb 3 2 oz)   01/28/22 51 kg (112 lb 8 oz)   01/27/22 50 3 kg (111 lb)   01/20/22 49 9 kg (110 lb)   01/19/22 50 3 kg (111 lb)   01/17/22 49 7 kg (109 lb 9 6 oz)   01/03/22 48 kg (105 lb 12 8 oz)   12/27/21 47 6 kg (105 lb)   12/20/21 49 7 kg (109 lb 9 1 oz)   12/07/21 50 8 kg (112 lb)   12/06/21 50 6 kg (111 lb 8 8 oz)   12/06/21 50 3 kg (111 lb)   11/26/21 49 9 kg (110 lb)   11/09/21 50 7 kg (111 lb 12 8 oz)   11/08/21 50 2 kg (110 lb 10 7 oz)   11/02/21 49 6 kg (109 lb 5 6 oz)   11/02/21 49 4 kg (109 lb)   10/25/21 51 3 kg (113 lb)       Weight History:    Usual Weight: 145#    Oncology Nutrition-Anthropometrics      Nutrition from 2/14/2022 in [address] [place] [address] Nutrition from 1/31/2022 in [place] [address] Oncology Dietitian [place]   Patient age (years): 80 years 80 years   Patient (female) height (in): 77 in 77 in   Current Weight to be used for anthropometric calculations (lbs) 114 2 lbs 110 2 lbs   Current Weight to be used for anthropometric calculations (kg) 51 9 kg 50 1 kg   BMI: 18 4 17 8   IBW female: 130 lbs 130 lbs   IBW (kg) female: 59 1 kg 59 1 kg   IBW % (female) 87 8 % 84 8 %   Adjusted BW (female): 126 lbs 125 lbs   Adjusted BW kg (female): 57 3 kg 56 8 kg   % weight change after 1 week: 2 % 0 2 %   Weight change after 1 week (lbs) 2 2 lbs 0 2 lbs   % weight change after 1 month: 4 2 % 5 %   Weight change after 1 month (lbs) 4 6 lbs 5 2 lbs   % weight change after 3 months: 2 1 % -2 5 %   Weight change after 3 months (lbs) 2 4 lbs -2 8 lbs          Nutrition-Focused Physical Findings: severe muscle depletion ([place]) - hollowing/scooping/depression    Food/Nutrition-Related History & Client/Social History:    Current Nutrition Impact Symptoms:  [] Nausea -has zofran  [x] Reduced Appetite -improved during second week after chemo  [] Acid Reflux    [] Vomiting  [x] Unintended Wt Loss  [] Malabsorption    [] Diarrhea  [] Unintended Wt Gain  [] Dumping Syndrome    [] Constipation -takes Miralax every other day; prune juice helps  [] Thick Mucous/Secretions  [x] Abdominal Pain   -with food intake  -takes creon    [x] Dysgeusia (Altered Taste) -starts to improve during second week after chemo  [] Xerostomia (Dry Mouth)  [] Gas    [x] Dysosmia (Altered Smell)  [] Thrush  [] Difficulty Chewing    [] Oral Mucositis (Sore Mouth)  [x] Fatigue  [x] Hyperglycemia -hba1c 5 9% 9/10/21   [] Odynophagia  [] Esophagitis  [] Other:    [] Dysphagia  [] Early Satiety  [] No Problems Eating      Food Allergies & Intolerances: no    Current Diet: Regular Diet, No Restrictions  Current Nutrition Intake: Unchanged from last visit  Appetite: Fair   Nutrition Route: PO  Oral Care: brushes BID  Activity level: ADL, goes to work 2 days a week      25 Hr Diet Recall:   Breakfast: oatmeal made with whole milk and butter, sweetened with monkfruit, blueberries or cheerios   Snack: cookies or chocolate candies OR peanut butter   Lunch: usually has to force herself to eat something, will eat toast or PB sandwich  Dinner: shake and baked pork chops, and mashed potatoes; will have fish 2 nights/ week with creamed spinach; beef bearnaise with asparagus  Snack: blueberries     Beverages: iced tea mixed with bottled water, coffee with [employer] creamer (12oz x1)   Supplements:    None      Oncology Nutrition-Estimated Needs      Nutrition from 2022 in [address] [place] [address] Nutrition from 2022 in [place] [address] Oncology Dietitian Alfredo   Weight type used IBW IBW   Weight in kilograms (kg) used for estimated needs 59 1 kg 59 1 kg   Energy needs formula:  30-35 kcal/kg 30-35 kcal/kg   Energy needs based on 30 kcal/k kcal 1773 kcal   Energy needs based on 35 kcal/k kcal  kcal   Protein needs formula: 1 2-1 5 g/kg 1 2-1 5 g/kg   Protein needs based on 1 2 g/k g 71 g   Protein needs based on 1 5 g/kg 89 g 89 g   Fluid needs formula: 30-35 mL/kg 30-35 mL/kg   Fluid needs based on 30 mL/kg 1773 mL 1773 mL   Fluid needs in ounces 60 oz 60 oz   Fluid needs based on 35 mL/kg 2069 mL 2069 mL   Fluid needs in ounces 70 oz 70 oz           Discussion & Intervention:   Bette Hernandez was evaluated today for an RD follow up regarding wt loss and poor po intake  Bette Hernandez is currently undergoing tx for pancreatic cancer  Today Bette Hernandez explains that she is doing okay  Her weight has increased over the past month  She continues her efforts to eat well to maintain her weight  She explains that she has been eating a lot of oatmeal recently  She has also been using prune juice to help relieve constipation which she states works well for her  She is appreciative of conversation today  Moving forward, Bette Hernandez will continue current nutrition plan of care  Materials Provided: not applicable  All questions and concerns addressed during todays visit  Bette Hernandez has RD contact information  Nutrition Diagnosis:    Increased Nutrient Needs (kcal & pro) related to increased demand for nutrients and disease state as evidenced by cancer dx and pt undergoing tx for cancer   Underweight related to physiological causes as evidenced by BMI 18 4  Monitoring & Evaluation:   Goals:  · weight maintenance/stabilization  · adequate nutrition impact symptom management  · pt to meet >/=75% estimated nutrition needs daily    · Progress Towards Goals: Progressing    Nutrition Rx & Recommendations:  · Diet: High Calorie, High Protein (for high calorie foods see pages 52-53, and for high protein foods see pages 49-51 in your Eating Hints book)  · Small, frequent snacks may be easier to tolerate than 3 large daily meals  Aim for 5-6 small meals per day; eat every 2-3 hours  · Include protein at all meals/snacks    · Avoid high-fat, greasy or fried foods  · Avoid high sugar foods (ex  >10 grams of sugar per serving)  · Include a variety of foods (as tolerated/allowed by diet)  · Stay hydrated by sipping fluids of choice/tolerance throughout the day, (48-64 oz  Per day)  Avoid carbonated beverages  · Liquid nutrition may be better tolerated than solids at times  · Alter food choices and eating patterns to accommodate changing needs  · Light physical activity (such as walking) is encouraged, as able/tolerated  · Refer to Eating Hints book and Diet and Nutrition book for other meal/snack ideas and symptom management  · Weigh yourself regularly  If you notice weight loss, make an effort to increase your daily food/calorie intake  If you continue to notice loss after these efforts, reach out to your dietitian to establish a plan to stabilize weight  · High calorie foods to try: peanut butter, cream soups, butter, cheese  (see pages 52-53 in your Eating Hints book)   · Continue prune juice for constipation as needed       Follow Up Plan: During infusion 3/14/22  Recommend Referral to Other Providers: none at this time

## 2022-02-14 NOTE — PATIENT INSTRUCTIONS
Nutrition Rx & Recommendations:  · Diet: High Calorie, High Protein (for high calorie foods see pages 52-53, and for high protein foods see pages 49-51 in your Eating Hints book)  · Small, frequent snacks may be easier to tolerate than 3 large daily meals  Aim for 5-6 small meals per day; eat every 2-3 hours  · Include protein at all meals/snacks  · Avoid high-fat, greasy or fried foods  · Avoid high sugar foods (ex  >10 grams of sugar per serving)  · Include a variety of foods (as tolerated/allowed by diet)  · Stay hydrated by sipping fluids of choice/tolerance throughout the day, (48-64 oz  Per day)  Avoid carbonated beverages  · Liquid nutrition may be better tolerated than solids at times  · Alter food choices and eating patterns to accommodate changing needs  · Light physical activity (such as walking) is encouraged, as able/tolerated  · Refer to Eating Hints book and Diet and Nutrition book for other meal/snack ideas and symptom management  · Weigh yourself regularly  If you notice weight loss, make an effort to increase your daily food/calorie intake  If you continue to notice loss after these efforts, reach out to your dietitian to establish a plan to stabilize weight  · High calorie foods to try: peanut butter, cream soups, butter, cheese  (see pages 52-53 in your Eating Hints book)   · Continue prune juice for constipation as needed       Follow Up Plan: During infusion 3/14/22  Recommend Referral to Other Providers: none at this time

## 2022-02-16 NOTE — TELEPHONE ENCOUNTER
Primary palliative medicine provider:   Dr Armani Herrmann    Medication requested:  Oxycodone 20 mg IR    If for pain, how has the patient been taking their pain medicine? 0 5 to 1 tablet every 4 hours as needed     Last appointment: 01/19    Next scheduled appointment: 2/23    PDMP review:    Werner Gonzales # FILLED WRITTEN   1 01/14/2022 01/11/2022 oxyCODONE HCL 90 0 15 20  0 Joss Lopes

## 2022-02-18 NOTE — PROGRESS NOTES
Call placed to patient and spoke with patient's son who reports "Holter monitor thing went well -- actually better than thought "  States he has been monitoring her MyChart to see if any results have been listed  Racquel Matthews is knowledgeable of of both appts scheduled for 2/23 stating "I hope they do not overlap "  CM encouraged him to speak with Palliative Care office if their appt goes longer than expected so palliative can let cardiology now if there is any delay in her arrival that appt  Racquel Matthews verbalized understanding  Education provided regarding heart disease, symptoms and red flags of when to call the doctor  ERIC emphasized to take all medications as prescribed, follow a heart healthy diet and to call doctor for 1) weight gain of 3 pounds in day or 5 pounds in 1 week, 2) edema to BLE or abdomen and 3) SOB that does not resolve with rest  Son verbalized same  He did report that she does have a slight productive cough that started a few days ago with light off white phlegm  CM encouraged him to call the doctor should the cough worsen, develop a fever or sputum color increases / changes color to yellow or green

## 2022-02-23 NOTE — PROGRESS NOTES
Outpatient Follow-Up - Palliative and Supportive Care   Ann Marie Thompson 80 y o  female [id number]    Assessment & Plan  Problem List Items Addressed This Visit        Digestive    Drug-induced constipation    Relevant Medications    senna (SENOKOT) 8 6 MG tablet    Pancreatic adenocarcinoma (HCC) - Primary    Relevant Medications    acetaminophen (TYLENOL) 500 mg tablet    gabapentin (Neurontin) 100 mg capsule       Other    Reactive depression    Anxiety    Mild protein-calorie malnutrition (HCC)    Medical marijuana use    Cancer related pain    Relevant Medications    acetaminophen (TYLENOL) 500 mg tablet    gabapentin (Neurontin) 100 mg capsule    Palliative care patient    Relevant Medications    acetaminophen (TYLENOL) 500 mg tablet    gabapentin (Neurontin) 100 mg capsule        #symptom management  #cancer-related pain  #chronic pain   - add APAP 500 mg PO Q6H PRN   - max daily 3000 mg   - continue oxy-IR 10-20 mg PO Q4H PRN   - use of 4 tabs/day [80 mg oxy]   - add gabapentin 100 mg PO BID     #OIC   - continue miralax 17 g PO QDaily PRN   - continue senna 1 tab PO QDaily PRN     #nausea   - continue ondansetron 4 mg PO Q8H PRN [first line]   - continue prochlorperazine 10 mg PO Q6H PRN [second line]     #depression   - continue escitalopram 20 mg PO QDaily    #decreased appetite  #weight loss   - reports improving appetite given improved fluid balance, may be attributed to bowel edema   - continue Ensure 1-2 bottles/day   - may consider trial of dexamethasone 2 mg PO BID x 7 days for appetite stimulant   - continue Mindbloom product     #MMJ   - certified with MMJ card    #recent hospitalization   - recent admission 01/19-01/23 for new onset AF with RVR  Initiated rate control medications  No AC initiated      #goals of care   - continue cancer-directed therapy   - no AD completed to date, will continue to offer support as needed   - goal to optimize symptoms management    #psychosocial support   - emotional support provided   - ,  1 year ago   - one adult son [Abdi]      Next [facility] Follow up in 4 weeks  Controlled Substance Review    [place]MP or [place]  reviewed: No red flags were identified; safe to proceed with prescription  [place] PDMP Review       Value Time User    PDMP Reviewed  Yes 2022  7:14 AM Laura Stauffer MD          Medications adjusted this encounter:  Requested Prescriptions     Signed Prescriptions Disp Refills    acetaminophen (TYLENOL) 500 mg tablet 50 tablet 0     Sig: Take 1 tablet (500 mg total) by mouth every 6 (six) hours as needed for mild pain    gabapentin (Neurontin) 100 mg capsule 60 capsule 0     Sig: Take 1 capsule (100 mg total) by mouth 2 (two) times a day    senna (SENOKOT) 8 6 MG tablet 30 tablet 3     Sig: Take 1 tablet (8 6 mg total) by mouth daily     No orders of the defined types were placed in this encounter  Medications Discontinued During This Encounter   Medication Reason    senna (SENOKOT) 8 6 MG tablet Reorder         Jose M Peng was seen today for symptoms and planning cares related to above illnesses  I have reviewed the patient's controlled substance dispensing history in the Prescription Drug Monitoring Program in compliance with the [facility] regulations before prescribing any controlled substances  They are invited to continue to follow with us  If there are questions or concerns, please contact us through our clinic/answering service 24 hours a day, seven days a week  Laura Stauffer MD  [facility] Palliative and Supportive Care  [phone]      Visit Information    Accompanied By: Venessa Urbina    Source of History: Self, Family member, Medical record    History Limitations: None      History of Present Illness    Jose M Peng is a 80 y o  female who presents in follow up of symptoms related to pancreatic adenocarcinoma   Pertinent issues include: symptom management, pain, neoplasm related, assessment of goals of care, advance care planning  Patient reports overall fatigue with decreased appetite  Recently improved appetite, however, decreased with 4-5 lb weight loss in the past 4 days  Attributes to loss in taste  Attributes decreased appetite with timing of increased ankle edema and cough, improving since furosemide therapy  Persistent epigastric and bank pain, attributed to cancer-related pain + chronic back pain, intermittent, managed with oxy-IR 20 mg x 4 tabs/day, onset 15-20 minutes, lasting 4-6 hours  Intermittent nausea without vomiting, managed effectively with antiemetic therapy  BM every other day, use of bowel regimen + prune juice  Fragmented sleep in the setting of pain  Recent admission 01/19-01/23 for new onset AF with RVR  Initiated rate control medications  No AC initiated  Past medical, surgical, social, and family histories are reviewed and pertinent updates are made  Review of Systems   Constitutional: Positive for decreased appetite, malaise/fatigue and weight loss  Negative for chills and fever  HENT: Negative for congestion  Eyes: Negative for visual disturbance  Cardiovascular: Positive for leg swelling  Negative for chest pain, palpitations and syncope  Respiratory: Negative for shortness of breath  Musculoskeletal: Positive for back pain  Negative for falls  Gastrointestinal: Positive for abdominal pain, constipation and nausea  Negative for vomiting  Genitourinary: Negative for frequency  Neurological: Negative for headaches  Psychiatric/Behavioral: The patient has insomnia  All other systems reviewed and are negative  Vital Signs    /60 (BP Location: Left arm, Cuff Size: Standard)   Pulse 88   Temp 97 7 °F (36 5 °C) (Skin)   Resp 20   Wt 49 4 kg (109 lb)   SpO2 98%   BMI 17 60 kg/m²     Physical Exam and Objective Data  Physical Exam  Vitals and nursing note reviewed  Constitutional:       General: She is awake  Appearance: She is not diaphoretic  Comments: Sitting up comfortably in NAD  BMI 17 6  Non-toxic appearing   HENT:      Head: Normocephalic and atraumatic  Right Ear: External ear normal       Left Ear: External ear normal       Nose: No rhinorrhea  Eyes:      Comments: No gaze preference   Cardiovascular:      Rate and Rhythm: Normal rate  Pulmonary:      Effort: No tachypnea, accessory muscle usage or respiratory distress  Comments: Completes full sentences without difficulty  Musculoskeletal:      Cervical back: Normal range of motion  Neurological:      General: No focal deficit present  Mental Status: She is alert and oriented to person, place, and time  Psychiatric:         Attention and Perception: Attention normal          Mood and Affect: Mood and affect normal          Speech: Speech normal          Cognition and Memory: Cognition and memory normal            Radiology and Laboratory:  I personally reviewed and interpreted the following results:    Most Recent COVID-19 Results:  Lab Results   Component Value Date/Time    SARSCOV2 Negative 01/19/2022 06:20 PM       Most Recent Lab Work:  Lab Results   Component Value Date/Time    SODIUM 141 02/11/2022 11:34 AM    K 3 3 (L) 02/11/2022 11:34 AM    BUN 14 02/11/2022 11:34 AM    CREATININE 0 84 02/11/2022 11:34 AM    GLUC 121 02/11/2022 11:34 AM     Lab Results   Component Value Date/Time    AST 22 02/11/2022 11:34 AM    ALT 45 02/11/2022 11:34 AM    ALB 3 5 02/11/2022 11:34 AM     Lab Results   Component Value Date/Time    HGB 9 5 (L) 02/11/2022 11:34 AM    WBC 37 73 (HH) 02/11/2022 11:34 AM     02/11/2022 11:34 AM    INR 1 09 01/19/2022 06:20 PM    PTT 29 01/19/2022 06:20 PM       Most Recent Imaging [last 30 days]:  Procedure: Holter monitor    Result Date: 2/14/2022  Narrative: INDICATIONS: atrial fibrillation DESCRIPTION OF FINDINGS: The patient was predominantly in atrial fibrillation (75 2% of total beats) throughout the study   The average heart rate was 96 beats per minute  The heart rate ranged from 54 to 169 beats per minute  Ventricular ectopic activity consisted of 833 beats (0 3% of total beats)  There was no sustained or nonsustained ventricular tachycardia  Supraventricular ectopic activity consisted of 140 beats (0 1% of total beats)  There were two 3-beat atrial runs  The longest R-R interval was 2 2 seconds during a conversion pause No significant cardiac symptoms reported  Impression: The patient was predominantly in atrial fibrillation (75 2% of total beats) throughout the study  Average heart rate was 96 beats per minute Rare ectopy was noted  The longest R-R interval was 2 2 seconds during a conversion pause No significant cardiac symptoms reported  ----- Letta Moritz MD [facility]       35 minutes was spent face to face with Eris Staley and her son with greater than 50% of the time spent in counseling or coordination of care including discussions of provided medical updates, discussed palliative care, determined goals of care, determined social/family support, discussed plans of care, discussed symptom management, provided psychosocial support  Gabapentin therapy initiated  Medication refill  PDMP Reviewed  All of the patient's or agent's questions were answered during this discussion

## 2022-02-23 NOTE — PROGRESS NOTES
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, active smoker, anemia presented for cardiology follow-up    She was hospitalized in January 2022 at [facility] for tachycardia and shortness of breath  She was diagnosed to have new onset atrial fibrillation  She was on IV Cardizem drip during hospital course which was changed to oral Cardizem  She was given digoxin but which was changed to metoprolol due to risk of toxicity  Patient did not wanted to proceed with oral anticoagulation  She was discharged on Cardizem and metoprolol  Echocardiogram done on 01/20/2022 showed LVEF of 55-60%, mild MR, mild AI, mild TR, no wall motion abnormalities    Since hospital discharge patient underwent 48 hour Holter which showed atrial fibrillation throughout the study with average heart rate 96 beats per minute, no significant pause     She is here for follow-up  Since hospital discharge patient is doing okay  Her constipation has resolved  He denies any palpitation, orthopnea, paroxysmal nocturnal dyspnea or loss of conscious  She does have leg edema which is intermittent and get especially worse after chemotherapy    Current medications reviewed  Risk, benefit and alternatives of oral anticoagulation discussed with the patient  At present time patient is not interested in oral anticoagulant    She is taking 325 mg aspirin given by primary care physician and would like to continue it  Labs from February 2022 reviewed  Creatinine 0 84, GFR 66, AST ALT within normal limit, increase LP, hemoglobin 9 5 with WBC 37    Review of Systems: All review of system negative except as mentioned above    Patient Active Problem List   Diagnosis    DDD (degenerative disc disease), lumbar    Chronic pancreatitis ([place] [address] )    Mixed hyperlipidemia    Allergic rhinitis    Impaired fasting glucose    Essential hypertension    Tobacco use    Osteoporosis    Reactive depression    Weight loss    Anemia    Drug-induced constipation    Anxiety    Spinal stenosis    Pancreatic adenocarcinoma ([place] [address] )    Encounter for chemotherapy management    Mild protein-calorie malnutrition ([place] [address] )    At high risk for infection due to chemotherapy    Port-A-Cath in place    Medical marijuana use    Cancer related pain    Palliative care patient    Atrial fibrillation with RVR ([place] [address] )     Past Medical History:   Diagnosis Date    Anxiety     Chronic pancreatitis ([place] [address] )     Depression     Deviated nasal septum     H/O colonoscopy     6/7/10    Hyperlipidemia     Hypertension     Impaired fasting glucose     Spinal stenosis      Social History     Socioeconomic History    Marital status:       Spouse name: Not on file    Number of children: 1    Years of education: Not on file    Highest education level: Not on file   Occupational History    Not on file   Tobacco Use    Smoking status: Current Every Day Smoker     Packs/day: 0 50     Types: Cigarettes    Smokeless tobacco: Never Used   Vaping Use    Vaping Use: Never used   Substance and Sexual Activity    Alcohol use: Never     Comment: wine with tea  a couple of times a week     Drug use: No    Sexual activity: Not Currently   Other Topics Concern    Not on file   Social History Narrative    Not on file     Social Determinants of Health     Financial Resource Strain: Not on file   Food Insecurity: No Food Insecurity    Worried About Running Out of Food in the Last Year: Never true    Rayna of Food in the Last Year: Never true Transportation Needs: No Transportation Needs    Lack of Transportation (Medical): No    Lack of Transportation (Non-Medical): No   Physical Activity: Not on file   Stress: Not on file   Social Connections: Unknown    Frequency of Communication with Friends and Family: More than three times a week    Frequency of Social Gatherings with Friends and Family: More than three times a week    Attends [employer] Services: Not on file   [employer]s or Organizations: Not on file    Attends Club or Organization Meetings: Not on file    Marital Status:     Intimate Partner Violence: Not on file   Housing Stability: Low Risk     Unable to Pay for Housing in the Last Year: No    Number of Places Lived in the Last Year: 1    Unstable Housing in the Last Year: No      Family History   Problem Relation Age of Onset    Multiple myeloma Mother     Coronary artery disease Father     Stroke Father         CVA     Past Surgical History:   Procedure Laterality Date    APPENDECTOMY      CHOLECYSTECTOMY      EXPLORATORY LAPAROTOMY      IR PORT PLACEMENT  11/2/2021    TUBAL LIGATION         Current Outpatient Medications:     acetaminophen (TYLENOL) 500 mg tablet, Take 1 tablet (500 mg total) by mouth every 6 (six) hours as needed for mild pain, Disp: 50 tablet, Rfl: 0    atorvastatin (LIPITOR) 20 mg tablet, TAKE 1 TABLET EVERY DAY, Disp: 90 tablet, Rfl: 3    diltiazem (CARDIZEM CD) 120 mg 24 hr capsule, Take 1 capsule (120 mg total) by mouth daily, Disp: 30 capsule, Rfl: 1    escitalopram (LEXAPRO) 20 mg tablet, Take 1 tablet (20 mg total) by mouth daily, Disp: 90 tablet, Rfl: 5    fluticasone (FLONASE) 50 mcg/act nasal spray, 2 sprays into each nostril daily, Disp: , Rfl:     furosemide (LASIX) 20 mg tablet, 1 bid prn edema, Disp: 60 tablet, Rfl: 5    gabapentin (Neurontin) 100 mg capsule, Take 1 capsule (100 mg total) by mouth 2 (two) times a day, Disp: 60 capsule, Rfl: 0     MG tablet, TAKE 1 TABLET (600 MG TOTAL) BY MOUTH 3 (THREE) TIMES A DAY, Disp: 270 tablet, Rfl: 3    metoprolol tartrate (LOPRESSOR) 25 mg tablet, Take 1 tablet (25 mg total) by mouth every 12 (twelve) hours, Disp: 60 tablet, Rfl: 1    ondansetron (ZOFRAN) 4 mg tablet, Take 1 tablet (4 mg total) by mouth every 8 (eight) hours as needed for nausea or vomiting, Disp: 30 tablet, Rfl: 0    oxyCODONE (ROXICODONE) 20 MG TABS, Take 0 5-1 tablets (10-20 mg total) by mouth every 4 (four) hours as needed for moderate pain Max Daily Amount: 120 mg, Disp: 90 tablet, Rfl: 0    pancrelipase, Lip-Prot-Amyl, (CREON) 12,000 units capsule, Take 12,000 units of lipase by mouth 3 (three) times a day with meals, Disp: 270 capsule, Rfl: 3    polyethylene glycol (MIRALAX) 17 g packet, Take 17 g by mouth daily as needed (Constipation), Disp: 20 each, Rfl: 0    prochlorperazine (COMPAZINE) 10 mg tablet, Take 1 tablet (10 mg total) by mouth every 6 (six) hours as needed for nausea or vomiting, Disp: 30 tablet, Rfl: 0    senna (SENOKOT) 8 6 MG tablet, Take 1 tablet (8 6 mg total) by mouth daily, Disp: 30 tablet, Rfl: 3  Allergies   Allergen Reactions    Morphine Abdominal Pain       Labs:  Hospital Outpatient Visit on 02/11/2022   Component Date Value    Sodium 02/11/2022 141     Potassium 02/11/2022 3 3*    Chloride 02/11/2022 105     CO2 02/11/2022 26     ANION GAP 02/11/2022 10     BUN 02/11/2022 14     Creatinine 02/11/2022 0 84     Glucose 02/11/2022 121     Calcium 02/11/2022 8 6     AST 02/11/2022 22     ALT 02/11/2022 45     Alkaline Phosphatase 02/11/2022 228*    Total Protein 02/11/2022 6 5     Albumin 02/11/2022 3 5     Total Bilirubin 02/11/2022 0 30     eGFR 02/11/2022 66     WBC 02/11/2022 37 73*    RBC 02/11/2022 2 88*    Hemoglobin 02/11/2022 9 5*    Hematocrit 02/11/2022 30 4*    MCV 02/11/2022 106*    MCH 02/11/2022 33 0     MCHC 02/11/2022 31 3*    RDW 02/11/2022 15 1     MPV 02/11/2022 10 2  Platelets 69/48/4617 172     Segmented % 02/11/2022 82*    Bands % 02/11/2022 3     Lymphocytes % 02/11/2022 12*    Monocytes % 02/11/2022 3*    Eosinophils, % 02/11/2022 0     Basophils % 02/11/2022 0     Absolute Neutrophils 02/11/2022 32 07*    Lymphocytes Absolute 02/11/2022 4 53*    Monocytes Absolute 02/11/2022 1 13     Eosinophils Absolute 02/11/2022 0 00     Basophils Absolute 02/11/2022 0 00     Platelet Estimate 61/18/2718 Adequate    Hospital Outpatient Visit on 01/28/2022   Component Date Value    CA 19-9 01/28/2022 43*    WBC 01/28/2022 8 22     RBC 01/28/2022 2 64*    Hemoglobin 01/28/2022 8 6*    Hematocrit 01/28/2022 27 2*    MCV 01/28/2022 103*    MCH 01/28/2022 32 6     MCHC 01/28/2022 31 6     RDW 01/28/2022 13 8     MPV 01/28/2022 9 3     Platelets 82/42/2369 196     nRBC 01/28/2022 0     Neutrophils Relative 01/28/2022 72     Immat GRANS % 01/28/2022 0     Lymphocytes Relative 01/28/2022 18     Monocytes Relative 01/28/2022 9     Eosinophils Relative 01/28/2022 1     Basophils Relative 01/28/2022 0     Neutrophils Absolute 01/28/2022 5 81     Immature Grans Absolute 01/28/2022 0 02     Lymphocytes Absolute 01/28/2022 1 49     Monocytes Absolute 01/28/2022 0 77     Eosinophils Absolute 01/28/2022 0 11     Basophils Absolute 01/28/2022 0 02     Sodium 01/28/2022 137     Potassium 01/28/2022 4 1     Chloride 01/28/2022 107     CO2 01/28/2022 25     ANION GAP 01/28/2022 5     BUN 01/28/2022 14     Creatinine 01/28/2022 0 83     Glucose 01/28/2022 127     Calcium 01/28/2022 8 9     Corrected Calcium 01/28/2022 9 4     AST 01/28/2022 23     ALT 01/28/2022 84*    Alkaline Phosphatase 01/28/2022 170*    Total Protein 01/28/2022 6 2*    Albumin 01/28/2022 3 4*    Total Bilirubin 01/28/2022 0 36     eGFR 01/28/2022 67    No results displayed because visit has over 200 results        Hospital Outpatient Visit on 01/14/2022   Component Date Value    WBC 01/14/2022 5 91     RBC 01/14/2022 3 25*    Hemoglobin 01/14/2022 10 7*    Hematocrit 01/14/2022 33 1*    MCV 01/14/2022 102*    MCH 01/14/2022 32 9     MCHC 01/14/2022 32 3     RDW 01/14/2022 13 8     MPV 01/14/2022 9 7     Platelets 17/48/3369 168     nRBC 01/14/2022 0     Neutrophils Relative 01/14/2022 66     Immat GRANS % 01/14/2022 0     Lymphocytes Relative 01/14/2022 22     Monocytes Relative 01/14/2022 9     Eosinophils Relative 01/14/2022 2     Basophils Relative 01/14/2022 1     Neutrophils Absolute 01/14/2022 3 95     Immature Grans Absolute 01/14/2022 0 02     Lymphocytes Absolute 01/14/2022 1 27     Monocytes Absolute 01/14/2022 0 53     Eosinophils Absolute 01/14/2022 0 10     Basophils Absolute 01/14/2022 0 04     Sodium 01/14/2022 140     Potassium 01/14/2022 3 5     Chloride 01/14/2022 105     CO2 01/14/2022 24     ANION GAP 01/14/2022 11     BUN 01/14/2022 13     Creatinine 01/14/2022 0 76     Glucose 01/14/2022 156*    Calcium 01/14/2022 8 8     Corrected Calcium 01/14/2022 9 4     AST 01/14/2022 15     ALT 01/14/2022 34     Alkaline Phosphatase 01/14/2022 128*    Total Protein 01/14/2022 6 3*    Albumin 01/14/2022 3 3*    Total Bilirubin 01/14/2022 0 41     eGFR 01/14/2022 74    Hospital Outpatient Visit on 12/30/2021   Component Date Value    CA 19-9 12/30/2021 82*    Sodium 12/30/2021 140     Potassium 12/30/2021 3 7     Chloride 12/30/2021 107     CO2 12/30/2021 24     ANION GAP 12/30/2021 9     BUN 12/30/2021 12     Creatinine 12/30/2021 0 73     Glucose 12/30/2021 161*    Calcium 12/30/2021 8 8     Corrected Calcium 12/30/2021 9 3     AST 12/30/2021 29     ALT 12/30/2021 109*    Alkaline Phosphatase 12/30/2021 300*    Total Protein 12/30/2021 6 4     Albumin 12/30/2021 3 4*    Total Bilirubin 12/30/2021 0 37     eGFR 12/30/2021 78     WBC 12/30/2021 6 60     RBC 12/30/2021 3 32*    Hemoglobin 12/30/2021 11 1*    Hematocrit 12/30/2021 34 1*    MCV 12/30/2021 103*    MCH 12/30/2021 33 4     MCHC 12/30/2021 32 6     RDW 12/30/2021 14 0     MPV 12/30/2021 10 2     Platelets 38/47/7962 210     nRBC 12/30/2021 0     Neutrophils Relative 12/30/2021 68     Immat GRANS % 12/30/2021 0     Lymphocytes Relative 12/30/2021 19     Monocytes Relative 12/30/2021 11     Eosinophils Relative 12/30/2021 2     Basophils Relative 12/30/2021 0     Neutrophils Absolute 12/30/2021 4 54     Immature Grans Absolute 12/30/2021 0 02     Lymphocytes Absolute 12/30/2021 1 23     Monocytes Absolute 12/30/2021 0 69     Eosinophils Absolute 12/30/2021 0 10     Basophils Absolute 12/30/2021 0 02    Hospital Outpatient Visit on 12/17/2021   Component Date Value    WBC 12/17/2021 30 62*    RBC 12/17/2021 3 43*    Hemoglobin 12/17/2021 11 3*    Hematocrit 12/17/2021 35 3     MCV 12/17/2021 103*    MCH 12/17/2021 32 9     MCHC 12/17/2021 32 0     RDW 12/17/2021 14 2     MPV 12/17/2021 9 9     Platelets 98/34/8024 162     nRBC 12/17/2021 0     Neutrophils Relative 12/17/2021 83*    Immat GRANS % 12/17/2021 2     Lymphocytes Relative 12/17/2021 9*    Monocytes Relative 12/17/2021 5     Eosinophils Relative 12/17/2021 1     Basophils Relative 12/17/2021 0     Neutrophils Absolute 12/17/2021 25 62*    Immature Grans Absolute 12/17/2021 >0 50*    Lymphocytes Absolute 12/17/2021 2 60     Monocytes Absolute 12/17/2021 1 45*    Eosinophils Absolute 12/17/2021 0 21     Basophils Absolute 12/17/2021 0 09     Sodium 12/17/2021 142     Potassium 12/17/2021 3 8     Chloride 12/17/2021 107     CO2 12/17/2021 25     ANION GAP 12/17/2021 10     BUN 12/17/2021 15     Creatinine 12/17/2021 0 75     Glucose 12/17/2021 136     Calcium 12/17/2021 8 6     Corrected Calcium 12/17/2021 9 1     AST 12/17/2021 20     ALT 12/17/2021 67     Alkaline Phosphatase 12/17/2021 315*    Total Protein 12/17/2021 6 3*    Albumin 12/17/2021 3 4*    Total Bilirubin 12/17/2021 0 25     eGFR 12/17/2021 76    Hospital Outpatient Visit on 12/03/2021   Component Date Value    Sodium 12/03/2021 144     Potassium 12/03/2021 3 7     Chloride 12/03/2021 109*    CO2 12/03/2021 23     ANION GAP 12/03/2021 12     BUN 12/03/2021 15     Creatinine 12/03/2021 0 71     Glucose 12/03/2021 117     Calcium 12/03/2021 8 8     AST 12/03/2021 32     ALT 12/03/2021 90*    Alkaline Phosphatase 12/03/2021 218*    Total Protein 12/03/2021 6 3*    Albumin 12/03/2021 3 5     Total Bilirubin 12/03/2021 0 43     eGFR 12/03/2021 81     WBC 12/03/2021 9 77     RBC 12/03/2021 3 49*    Hemoglobin 12/03/2021 11 2*    Hematocrit 12/03/2021 35 3     MCV 12/03/2021 101*    MCH 12/03/2021 32 1     MCHC 12/03/2021 31 7     RDW 12/03/2021 13 4     MPV 12/03/2021 9 9     Platelets 48/02/0728 334     nRBC 12/03/2021 0     Neutrophils Relative 12/03/2021 74     Immat GRANS % 12/03/2021 0     Lymphocytes Relative 12/03/2021 16     Monocytes Relative 12/03/2021 8     Eosinophils Relative 12/03/2021 1     Basophils Relative 12/03/2021 1     Neutrophils Absolute 12/03/2021 7 22     Immature Grans Absolute 12/03/2021 0 04     Lymphocytes Absolute 12/03/2021 1 58     Monocytes Absolute 12/03/2021 0 74     Eosinophils Absolute 12/03/2021 0 12     Basophils Absolute 12/03/2021 0 07     CA 19-9 12/03/2021 114*   Hospital Outpatient Visit on 11/19/2021   Component Date Value    WBC 11/19/2021 25 21*    RBC 11/19/2021 3 49*    Hemoglobin 11/19/2021 11 4*    Hematocrit 11/19/2021 35 2     MCV 11/19/2021 101*    MCH 11/19/2021 32 7     MCHC 11/19/2021 32 4     RDW 11/19/2021 12 9     MPV 11/19/2021 10 2     Platelets 55/20/9673 143*    Sodium 11/19/2021 139     Potassium 11/19/2021 3 3*    Chloride 11/19/2021 105     CO2 11/19/2021 25     ANION GAP 11/19/2021 9     BUN 11/19/2021 11     Creatinine 11/19/2021 0 83     Glucose 11/19/2021 148*    Calcium 11/19/2021 8 8     AST 11/19/2021 36     ALT 11/19/2021 134*    Alkaline Phosphatase 11/19/2021 464*    Total Protein 11/19/2021 6 7     Albumin 11/19/2021 3 6     Total Bilirubin 11/19/2021 0 36     eGFR 11/19/2021 67     Segmented % 11/19/2021 67     Bands % 11/19/2021 14*    Lymphocytes % 11/19/2021 11*    Monocytes % 11/19/2021 4     Eosinophils, % 11/19/2021 1     Basophils % 11/19/2021 0     Metamyelocytes% 11/19/2021 1     Myelocytes % 11/19/2021 2*    Absolute Neutrophils 11/19/2021 20 42*    Lymphocytes Absolute 11/19/2021 2 77     Monocytes Absolute 11/19/2021 1 01     Eosinophils Absolute 11/19/2021 0 25     Basophils Absolute 11/19/2021 0 00     RBC Morphology 11/19/2021 Normal     Platelet Estimate 34/89/3342 Borderline*   Hospital Outpatient Visit on 11/05/2021   Component Date Value    CA 19-9 11/05/2021 166*    WBC 11/05/2021 7 08     RBC 11/05/2021 3 83     Hemoglobin 11/05/2021 12 7     Hematocrit 11/05/2021 37 9     MCV 11/05/2021 99*    MCH 11/05/2021 33 2     MCHC 11/05/2021 33 5     RDW 11/05/2021 12 2     MPV 11/05/2021 9 6     Platelets 14/82/7921 211     nRBC 11/05/2021 0     Neutrophils Relative 11/05/2021 66     Immat GRANS % 11/05/2021 1     Lymphocytes Relative 11/05/2021 25     Monocytes Relative 11/05/2021 7     Eosinophils Relative 11/05/2021 1     Basophils Relative 11/05/2021 0     Neutrophils Absolute 11/05/2021 4 69     Immature Grans Absolute 11/05/2021 0 04     Lymphocytes Absolute 11/05/2021 1 74     Monocytes Absolute 11/05/2021 0 50     Eosinophils Absolute 11/05/2021 0 08     Basophils Absolute 11/05/2021 0 03     Sodium 11/05/2021 143     Potassium 11/05/2021 3 1*    Chloride 11/05/2021 108     CO2 11/05/2021 24     ANION GAP 11/05/2021 11     BUN 11/05/2021 12     Creatinine 11/05/2021 0 81     Glucose 11/05/2021 180*    Calcium 11/05/2021 9 0     AST 11/05/2021 20     ALT 11/05/2021 25     Alkaline Phosphatase 11/05/2021 114     Total Protein 11/05/2021 6 8     Albumin 11/05/2021 3 7     Total Bilirubin 11/05/2021 0 54     eGFR 11/05/2021 69    Appointment on 10/18/2021   Component Date Value    Miscellaneous Lab Test R* 10/18/2021 kit collected on behalf of patient  Results to follow    There may be more visits with results that are not included  Imaging: Holter monitor    Result Date: 2/14/2022  Narrative: INDICATIONS: atrial fibrillation DESCRIPTION OF FINDINGS: The patient was predominantly in atrial fibrillation (75 2% of total beats) throughout the study  The average heart rate was 96 beats per minute  The heart rate ranged from 54 to 169 beats per minute  Ventricular ectopic activity consisted of 833 beats (0 3% of total beats)  There was no sustained or nonsustained ventricular tachycardia  Supraventricular ectopic activity consisted of 140 beats (0 1% of total beats)  There were two 3-beat atrial runs  The longest R-R interval was 2 2 seconds during a conversion pause No significant cardiac symptoms reported  Impression: The patient was predominantly in atrial fibrillation (75 2% of total beats) throughout the study  Average heart rate was 96 beats per minute Rare ectopy was noted  The longest R-R interval was 2 2 seconds during a conversion pause No significant cardiac symptoms reported   ----- Columba Castaneda MD [facility] [place]       Physical Exam:  General:  Thin, awake, alert and oriented x3, not in distress  Neck: supple, no JVD  Eyes: PERRL, conjunctiva normal  Lungs:  Bilateral air entry positive, no wheeze/rhonchi or crackle  Heart:  Irregularly irregular no significant murmur  Abdomen:  Soft ,nondistended ,nontender, bowel sounds positive  Extremities:  No leg edema, no deformity, ROM normal  Neuro:  Moving all extremities, speech clear  Skin: warm, no rash    /68 (BP Location: Left arm, Patient Position: Sitting, Cuff Size: Standard)   Pulse 72   Resp 16   Ht 5' 5" (1 651 m)   Wt 49 4 kg (109 lb)   SpO2 98%   BMI 18 14 kg/m²     Cardiographics :  As mentioned above      Assessment:    1  New onset atrial fibrillation in January 2022  Patient is currently in AFib with ventricular rate controlled  Asymptomatic    2  Recently diagnosed pancreatic cancer on chemotherapy non operable follows up with Oncology  3  Hyperlipidemia  4  Active smoker  Patient strongly advised to quit  5  BMI 17  6  Inoperable spinal stenosis  Patient cannot exert at baseline due to spinal stenosis    Recommendations:    Patient to continue Cardizem 120 mg daily, metoprolol tartrate 25 mg twice a day, Lipitor 20 mg and aspirin 325 mg daily    She was advised to stay hydrated  We have decided medical management at present time patient is going through a lot especially new diagnosis of cancer and chemo and will hold off on cardiac stress test  Advised to elevate lower extremity whenever possible  Follow-up with Oncology as per schedule  She was advised that if she feels palpitation or dizziness she can call 911 or go to emergency department or call the clinic for early appointment    Repeat lipid panel order and if lipid panel is well controlled will decide to decrease dose of Lipitor    Return to clinic in 6 months or early as needed  Above all discussed with patient and son in the clinic    They understands and agrees

## 2022-02-25 NOTE — TELEPHONE ENCOUNTER
Called patient and son answered phone  He states that patient does not have any fevers, chills  No signs of infection  She is eating and drinking okay  No weight loss  She did receive Neulasta injections 2/14  She is scheduled for next treatment cycle 5, day 1 on 02/28 with Neulasta Onpro afterwards  I will hold Onpro after this upcoming treatment only for now  Patient's son appreciated call  Next visit is 3/3/22 in our office

## 2022-02-25 NOTE — TELEPHONE ENCOUNTER
MO lab called in critical WBC of 42 55  Pt did receive Neulasta injection on 2/14  Call placed to patient to discuss value  Patient declined and fevers, chills, body aches or sings of infection  Pt stated she is feeling great  Jing, you will see the patient in office on 3/3/22  Between day #1 and #15 of her next treatment  Neulasta is still ordered for day #15  Please make adjustments to plan if needed, thank you

## 2022-02-25 NOTE — PROGRESS NOTES
Pt presents for port a cath flush  Pt offers no complaints  Port accessed, flushed, labs drawn, saline locked and de-accessed without difficulty  AVS printed, Next appointment reviewed

## 2022-03-02 NOTE — PROGRESS NOTES
HEMATOLOGY / ONCOLOGY CLINIC NOTE    Primary Care Provider: Lilia Ramirez MD  Referring Provider: Deyvi Salinas  MRN: [id number]  : 1940    Assessment / Plan:   1  Pancreatic adenocarcinoma [facility])  This is a 68-year-old female with a history of unresectable pancreatic cancer without distant metastasis  This was initially found 10/2021 after she had significant weight loss  She had an MRI showing a 5 cm pancreatic mass  CA 19 9 was 171  Needle biopsy confirmed diagnosis  She was not a surgical candidate due to the proximity to the [facility]  She was started on neoadjuvant gemcitabine 1000mg/m2 D1, D15 & Abraxane 125mg/m2 D1, D15  Cycle 1, day 1 was 2021  Patient's last CT scan was 2022 showing 4cm pancreatic mass still encasing multiple structures with note of occlusion of splenic vein with collaterals  22 --> CA 19-9 was 49  CMP did show alk phos 329 as below  WBC elevated as below  Otherwise, CBC, CMP stable  Patient will continue current treatment  We will obtain new lab work prior to treatment 3/14/22  We will obtain a full CT chest,abd,pelvis without contrast this time (due to concern of causing afib with rvr) in the next 2-3 weeks  She will follow up with Dr Katherine Escalante 3/25/22 as scheduled  - CT chest abdomen pelvis wo contrast; Future  - CBC and differential; Future  - Comprehensive metabolic panel; Future  - Cancer antigen 19-9; Future    2  Leukocytosis, unspecified type  Secondary to Onpro use  We will skip onpro use after next treatment  No fevers, chills  No s/s of infection  3  Elevated alkaline phosphatase level  Likely secondary to chemotherapy  Will ensure decreases on next labs and evaluate with next CT  AST, ALT normal      - CT chest abdomen pelvis wo contrast; Future    4  Cancer associated pain  Being managed by palliative care  Currently taking Oxycodone 20mg Q4 hours as needed  5  Constipation, unspecified constipation type  Likely related to oxycodone use  Patient taking Miralax every other day with senna daily which alleviates constipation  Patient voiced understanding and agreement to the above  The patient knows to call the office with any questions or concerns regarding the above  I have spent 30 minutes with Patient  today in which greater than 50% of this time was spent in counseling/coordination of care regarding Diagnostic results, Risks and benefits of tx options, Intructions for management, Patient and family education, Importance of tx compliance, Risk factor reductions and Impressions  Reason for visit:       Chief Complaint   Patient presents with    Follow-up     Pancreatic adenocarcinoma        History of Hematology / Oncology Illness:     Naheed Hill is a 80 y o  female who came in to follow up  1  Locally advanced Pancreatic adenocarcinoma   - 10/2021: presented with weight loss, imaging study showed 5 cm pancreatic body mass  Cancer marker CA 19-9 = 171  Needle biopsy on 10/13/2021, results showed   suspicious for pancreatic adenocarcinoma  Cancer encasing SMV, not a candidate for surgery     - MRI did not show metastatic disease      - 11/2021:  Plan to start new adjuvant treatment  Gemcitabine 1000 mg / m2 /Abraxane 100 mg / m2 + onpro , day 1 day 15;  guardian test, no MSI high detected  - cycle 1 day 15 cancel due to elevation of alkaline phosphatase,? Due to alcohol intake  Restart treatment on 12/06 day 1 of cycle 2  After alkaline phosphatase improved  - cycle # 3: dose reduce to gemcitabine 800 mg/M2, Abraxane 80 mg/M2 day 1 day 15  - 1/19/2022:  Hospitalized for AFib with RVR, unclear etiology patient following cardiologist    Patient felt this is due to oral contrast   Will skip or contrast the future  - Cycle # 4: add Onpro  - 1/18/2022: Last CT abd/pelvis: 4cm pancreatic mass encasing multiple structures with particular note made of occlusion of splenic vein with multiple collaterals  - 3/2022:  WBC elevated at 42K without infection  Likely Onpro related  Will hold onpro once after next treatment  2  Mother had history of multiple myeloma, maternal grandmother history of breast cancer  Oncology History   Pancreatic adenocarcinoma ([facility] [address] )   10/25/2021 Initial Diagnosis    Pancreatic adenocarcinoma ([facility] [address] )     2021 -  Chemotherapy    pegfilgrastim (NEULASTA ONPRO), 6 mg, Subcutaneous, Once, 4 of 5 cycles  Administration: 6 mg (2021), 6 mg (2021), 6 mg (2022), 6 mg (2022)  paclitaxel protein-bound (ABRAXANE) IVPB, 100 mg/m2 = 157 mg (80 % of original dose 125 mg/m2), Intravenous, Once, 5 of 6 cycles  Dose modification: 100 mg/m2 (original dose 125 mg/m2, Cycle 1, Reason: Performance Status), 80 mg/m2 (original dose 125 mg/m2, Cycle 3, Reason: Performance Status)  Administration: 157 mg (2021), 157 mg (2021), 157 mg (2021), 126 mg (1/3/2022), 126 mg (2022), 126 mg (2022), 126 mg (2022), 126 mg (2022)  gemcitabine (GEMZAR) infusion, 1,000 mg/m2 = 1,570 mg, Intravenous, Once, 5 of 6 cycles  Dose modification: 800 mg/m2 (original dose 1,000 mg/m2, Cycle 3, Reason: Performance Status)  Administration: 1,570 mg (2021), 1,570 mg (2021), 1,570 mg (2021), 1,255 9 mg (1/3/2022), 1,255 9 mg (2022), 1,255 9 mg (2022), 1,255 9 mg (2022), 1,255 9 mg (2022)       ECO - Symptomatic but completely ambulatory    Interval History:   "10/18/2021 : 81y F, has no history of malignancy, active smoker, presented with weight loss, imaging showed pancreatic mass, refered to [facility] onc for further management       Pt reported lost 40 lb in 8m and also developed abd pain and poor appetite  No reported skin color change  She has been smoking 10 yr , 1/2 pack a day      She has family hx of malignancies; she has no cancer in the past       ECOG = 0       2021:   Tolerated 1st treatment no major side effects; has fatigue for 2-3 days and improved  No abdominal pain  Patient was drinking alcohol until about 2 weeks ago     12/27/2021 :  Came for follow-up doing okay ; patient lost about 5 lb in the last months, also reported chemotherapy makes her very fatigued  Mild nausea  Still requires pain medicine       1/28/2022:  Patient came in for follow-up doing okay  Recently admitted to the hospital for AFib  Recovered  Body weight energy level at baseline "    3/4/2022: Patient came in for follow up  She presents with son  Patient has chronic abdominal pain, constipation  Abd pain being managed by palliative care with pain medications  Constipation relieved with miralax per patient  No new HA, CP, SOB, N/V/D  No bone pain  She states she has been feeling more fatigued with each passing treatment  Son became tearful in visit because he is worried about mothers quality of life while on these treatments  Problem list:       Patient Active Problem List   Diagnosis    DDD (degenerative disc disease), lumbar    Chronic pancreatitis ([facility] [address] )    Mixed hyperlipidemia    Allergic rhinitis    Impaired fasting glucose    Essential hypertension    Tobacco use    Osteoporosis    Reactive depression    Weight loss    Anemia    Drug-induced constipation    Anxiety    Spinal stenosis    Pancreatic adenocarcinoma ([facility] [address] )    Encounter for chemotherapy management    Mild protein-calorie malnutrition ([address] )    At high risk for infection due to chemotherapy    Port-A-Cath in place    Medical marijuana use    Cancer related pain    Palliative care patient    [facility] fibrillation with [facility] )       REVIEW OF SYMPTOMS:   Review of Systems   Constitutional: Positive for unexpected weight change (improved in last few months at 112 )  Negative for activity change, appetite change, chills, diaphoresis, fatigue and fever  HENT: Negative for mouth sores and nosebleeds  Eyes: Negative for visual disturbance     Respiratory: Negative for apnea, cough, choking, chest tightness, shortness of breath, wheezing and stridor  Cardiovascular: Negative for chest pain, palpitations and leg swelling  Gastrointestinal: Positive for abdominal pain (chronic abd pain  managed by pain medications)  Negative for anal bleeding, blood in stool, constipation, diarrhea, nausea and vomiting  Endocrine: Negative for cold intolerance  Genitourinary: Negative for hematuria, menstrual problem and vaginal bleeding  Musculoskeletal: Negative for arthralgias  Skin: Negative for color change, pallor and rash  Neurological: Negative for dizziness, syncope, light-headedness and headaches  Hematological: Negative for adenopathy  Does not bruise/bleed easily  Psychiatric/Behavioral: Negative for sleep disturbance  PHYSICAL EXAMINATION:     Vital Signs:   BP 98/62 (BP Location: Left arm, Cuff Size: Standard)   Pulse 100   Temp 97 6 °F (36 4 °C)   Resp 16   Ht 5' 5" (1 651 m)   Wt 51 kg (112 lb 8 oz)   SpO2 98%   BMI 18 72 kg/m²   Body surface area is 1 55 meters squared  Ht Readings from Last 3 Encounters:   03/03/22 5' 5" (1 651 m)   02/28/22 5' 5" (1 651 m)   02/23/22 5' 5" (1 651 m)       Wt Readings from Last 3 Encounters:   03/03/22 51 kg (112 lb 8 oz)   02/28/22 50 8 kg (112 lb)   02/23/22 49 4 kg (109 lb)          Physical Exam  Constitutional:       General: She is not in acute distress  Appearance: Normal appearance  She is not ill-appearing, toxic-appearing or diaphoretic  Comments: Cachectic    HENT:      Head: Normocephalic and atraumatic  Eyes:      General: No scleral icterus  Extraocular Movements: Extraocular movements intact  Conjunctiva/sclera: Conjunctivae normal       Pupils: Pupils are equal, round, and reactive to light  Cardiovascular:      Rate and Rhythm: Normal rate and regular rhythm  Heart sounds: Normal heart sounds  No murmur heard        Pulmonary:      Effort: Pulmonary effort is normal  No respiratory distress  Breath sounds: Normal breath sounds  No stridor  No wheezing, rhonchi or rales  Abdominal:      Palpations: Abdomen is soft  Tenderness: There is no abdominal tenderness  Musculoskeletal:         General: No tenderness  Normal range of motion  Cervical back: Normal range of motion and neck supple  Right lower leg: Edema (improved per pt --> B/L symmetrical +1 edema ) present  Left lower leg: Edema present  Lymphadenopathy:      Cervical: No cervical adenopathy  Skin:     General: Skin is warm and dry  Coloration: Skin is not jaundiced or pale  Findings: No bruising, erythema, lesion or rash  Neurological:      General: No focal deficit present  Mental Status: She is alert and oriented to person, place, and time  Mental status is at baseline  Cranial Nerves: No cranial nerve deficit  Motor: No weakness  Psychiatric:         Mood and Affect: Mood normal          Behavior: Behavior normal          Thought Content: Thought content normal          Judgment: Judgment normal          Reviewed historical information          PAST MEDICAL HISTORY:    Past Medical History:   Diagnosis Date    Anxiety     Chronic pancreatitis ([facility] [address]     Depression     Deviated nasal septum     H/O colonoscopy     6/7/10    Hyperlipidemia     Hypertension     Impaired fasting glucose     Spinal stenosis        PAST SURGICAL HISTORY:    Past Surgical History:   Procedure Laterality Date    APPENDECTOMY      CHOLECYSTECTOMY      EXPLORATORY LAPAROTOMY      IR PORT PLACEMENT  11/2/2021    TUBAL LIGATION           CURRENT MEDICATIONS:     Current Outpatient Medications:     acetaminophen (TYLENOL) 500 mg tablet, Take 1 tablet (500 mg total) by mouth every 6 (six) hours as needed for mild pain, Disp: 50 tablet, Rfl: 0    atorvastatin (LIPITOR) 20 mg tablet, TAKE 1 TABLET EVERY DAY, Disp: 90 tablet, Rfl: 3    diltiazem (CARDIZEM CD) 120 mg 24 hr capsule, Take 1 capsule (120 mg total) by mouth daily, Disp: 30 capsule, Rfl: 1    escitalopram (LEXAPRO) 20 mg tablet, Take 1 tablet (20 mg total) by mouth daily, Disp: 90 tablet, Rfl: 5    fluticasone (FLONASE) 50 mcg/act nasal spray, 2 sprays into each nostril daily, Disp: , Rfl:     furosemide (LASIX) 20 mg tablet, 1 bid prn edema, Disp: 60 tablet, Rfl: 5    gabapentin (Neurontin) 100 mg capsule, Take 1 capsule (100 mg total) by mouth 2 (two) times a day, Disp: 60 capsule, Rfl: 0     MG tablet, TAKE 1 TABLET (600 MG TOTAL) BY MOUTH 3 (THREE) TIMES A DAY, Disp: 270 tablet, Rfl: 3    metoprolol tartrate (LOPRESSOR) 25 mg tablet, Take 1 tablet (25 mg total) by mouth every 12 (twelve) hours, Disp: 60 tablet, Rfl: 1    ondansetron (ZOFRAN) 4 mg tablet, Take 1 tablet (4 mg total) by mouth every 8 (eight) hours as needed for nausea or vomiting, Disp: 30 tablet, Rfl: 0    oxyCODONE (ROXICODONE) 20 MG TABS, Take 0 5-1 tablets (10-20 mg total) by mouth every 4 (four) hours as needed for moderate pain Max Daily Amount: 120 mg, Disp: 90 tablet, Rfl: 0    pancrelipase, Lip-Prot-Amyl, (CREON) 12,000 units capsule, Take 12,000 units of lipase by mouth 3 (three) times a day with meals, Disp: 270 capsule, Rfl: 3    polyethylene glycol (MIRALAX) 17 g packet, Take 17 g by mouth daily as needed (Constipation), Disp: 20 each, Rfl: 0    prochlorperazine (COMPAZINE) 10 mg tablet, Take 1 tablet (10 mg total) by mouth every 6 (six) hours as needed for nausea or vomiting, Disp: 30 tablet, Rfl: 0    senna (SENOKOT) 8 6 MG tablet, Take 1 tablet (8 6 mg total) by mouth daily, Disp: 30 tablet, Rfl: 3    Family History   Social History     Tobacco Use    Smoking status: Current Every Day Smoker     Packs/day: 0 50     Types: Cigarettes    Smokeless tobacco: Never Used   Vaping Use    Vaping Use: Never used   Substance Use Topics    Alcohol use: Never     Comment: wine with tea  a couple of times a week     Drug use: No    (H) 02/25/20  Family History   Problem Relation Age of Onset    Multiple myeloma Mother     Coronary artery disease Father     Stroke Father         CVA   25     02/25/2022   sults   Component Value Date    WBC 42 55 (HH) 02/25/2022    HGB 10 2 (L) 02/25/2022    HCT 32 1 (L) 02/25/2022     (H) 02/25/2022     02/25/2022      Component Value Date    SODIUM 139 02/25/2022    K 4 2 02/25/2022     02/25/2022    CO2 28 02/25/2022    AGAP 6 02/25/2022    BUN 13 02/25/2022    CREATININE 0 76 02/25/2022    GLUC 97 02/25/2022    GLUF 122 (H) 09/10/2021    CALCIUM 8 8 02/25/2022    AST 24 02/25/2022    ALT 46 02/25/2022    ALKPHOS 329 (H) 02/25/2022    TP 6 4 02/25/2022    TBILI 0 29 02/25/2022    EGFR 74 02/25/2022       IMAGING:  Holter monitor  INDICATIONS: atrial fibrillation     DESCRIPTION OF FINDINGS:  The patient was predominantly in atrial fibrillation (75 2% of total   beats) throughout the study  The average heart rate was 96 beats per minute  The heart rate ranged from   54 to 169 beats per minute  Ventricular ectopic activity consisted of 833 beats (0 3% of total beats)  There was no sustained or nonsustained ventricular tachycardia  Supraventricular ectopic activity consisted of 140 beats (0 1% of total   beats)  There were two 3-beat atrial runs  The longest R-R interval was 2 2 seconds during a conversion pause    No significant cardiac symptoms reported  Impression:   The patient was predominantly in atrial fibrillation (75 2% of total   beats) throughout the study  Average heart rate was 96 beats per minute  Rare ectopy was noted  The longest R-R interval was 2 2 seconds during a conversion pause  No significant cardiac symptoms reported       -----  Keven Arnold MD [facility]

## 2022-03-03 NOTE — TELEPHONE ENCOUNTER
Left message for patient that her schedule has been updated  Advised patient that she can  an updated schedule when she comes in for  her next tx or call back for an update

## 2022-03-03 NOTE — TELEPHONE ENCOUNTER
From what I see, Day 15 of cycle 6 should be on 4/11  She has 2 appts on 3/28  Please let me know if her plan is not in correctly  Thank you!

## 2022-03-03 NOTE — TELEPHONE ENCOUNTER
Please scheeule infusion prior to next tx Orthostatic hypotension  cont midodrine  needs aggressive PT  awaiting am cortisol to make sure he does not have adrenal suppression

## 2022-03-07 PROBLEM — R60.9 EDEMA: Status: ACTIVE | Noted: 2022-01-01

## 2022-03-07 NOTE — ASSESSMENT & PLAN NOTE
Continue Julius for the time being  She is going to discuss a less expensive alternative with her pharmacy

## 2022-03-07 NOTE — TELEPHONE ENCOUNTER
Pt is requesting for labs to be drawn through her port  Is it possible to schedule her for this Thursday late morning? Thank you!

## 2022-03-07 NOTE — TELEPHONE ENCOUNTER
Left message for patient's son that labs have been scheduled before all of her treatments  Advised him to call back if he has any questions

## 2022-03-07 NOTE — TELEPHONE ENCOUNTER
Patient's son, Mary Lou Pineda, is calling in requesting for his mother to be scheduled for labs to be done since she has a port   He can be reached back at [phone]

## 2022-03-07 NOTE — PROGRESS NOTES
Assessment/Plan:  Return visit in 3 months       Problem List Items Addressed This Visit        Digestive    Chronic pancreatitis ([address] )     Continue Creon for the time being  She is going to discuss a less expensive alternative with her pharmacy  Pancreatic adenocarcinoma (HCC) - Primary       Cardiovascular and Mediastinum    Essential hypertension     Continue Cardizem CD 1 120 milligrams and Lopressor 25 milligrams b i d  Atrial fibrillation with RVR [facility])     Cardiology is in agreement with aspirin 325 milligrams daily         Relevant Medications    aspirin 325 mg tablet       Other    Edema     Continue Lasix as needed                 Subjective:      Patient ID: Marcy Reid is a 80 y o  female  Patient comes in for checkup  She continues to undergo chemotherapy for pancreatic cancer  Her edema is doing much better since adding Lasix  Palliative care is now taking over her pain management  She has concern regarding and the high price a pancreatic enzyme  The following portions of the patient's history were reviewed and updated as appropriate:   Past Medical History:  She has a past medical history of Anxiety, Chronic pancreatitis ([address] ), Depression, Deviated nasal septum, H/O colonoscopy, Hyperlipidemia, Hypertension, Impaired fasting glucose, and Spinal stenosis  ,  _______________________________________________________________________  Medical Problems:  does not have any pertinent problems on file ,  _______________________________________________________________________  Past Surgical History:   has a past surgical history that includes Appendectomy; Cholecystectomy; Exploratory laparotomy; Tubal ligation; and IR port placement (11/2/2021)  ,  _______________________________________________________________________  Family History:  family history includes Coronary artery disease in her father; Multiple myeloma in her mother; Stroke in her father ,  _______________________________________________________________________  Social History:   reports that she has been smoking cigarettes  She has been smoking about 0 50 packs per day  She has never used smokeless tobacco  She reports that she does not drink alcohol and does not use drugs  ,  _______________________________________________________________________  Allergies:  is allergic to morphine     _______________________________________________________________________  Current Outpatient Medications   Medication Sig Dispense Refill    acetaminophen (TYLENOL) 500 mg tablet Take 1 tablet (500 mg total) by mouth every 6 (six) hours as needed for mild pain 50 tablet 0    atorvastatin (LIPITOR) 20 mg tablet TAKE 1 TABLET EVERY DAY 90 tablet 3    diltiazem (CARDIZEM CD) 120 mg 24 hr capsule Take 1 capsule (120 mg total) by mouth daily 30 capsule 1    escitalopram (LEXAPRO) 20 mg tablet Take 1 tablet (20 mg total) by mouth daily 90 tablet 5    fluticasone (FLONASE) 50 mcg/act nasal spray 2 sprays into each nostril daily      furosemide (LASIX) 20 mg tablet 1 bid prn edema 60 tablet 5    gabapentin (Neurontin) 100 mg capsule Take 1 capsule (100 mg total) by mouth 2 (two) times a day 60 capsule 0     MG tablet TAKE 1 TABLET (600 MG TOTAL) BY MOUTH 3 (THREE) TIMES A  tablet 3    metoprolol tartrate (LOPRESSOR) 25 mg tablet Take 1 tablet (25 mg total) by mouth every 12 (twelve) hours 60 tablet 1    ondansetron (ZOFRAN) 4 mg tablet Take 1 tablet (4 mg total) by mouth every 8 (eight) hours as needed for nausea or vomiting 30 tablet 0    oxyCODONE (ROXICODONE) 20 MG TABS Take 0 5-1 tablets (10-20 mg total) by mouth every 4 (four) hours as needed for moderate pain Max Daily Amount: 120 mg 90 tablet 0    pancrelipase, Lip-Prot-Amyl, (CREON) 12,000 units capsule Take 12,000 units of lipase by mouth 3 (three) times a day with meals 270 capsule 3    polyethylene glycol (MIRALAX) 17 g packet Take 17 g by mouth daily as needed (Constipation) 20 each 0    prochlorperazine (COMPAZINE) 10 mg tablet Take 1 tablet (10 mg total) by mouth every 6 (six) hours as needed for nausea or vomiting 30 tablet 0    senna (SENOKOT) 8 6 MG tablet Take 1 tablet (8 6 mg total) by mouth daily 30 tablet 3    aspirin 325 mg tablet Take 1 tablet (325 mg total) by mouth daily       No current facility-administered medications for this visit      _______________________________________________________________________  Review of Systems   Constitutional: Negative  Respiratory: Negative  Gastrointestinal: Positive for nausea  Neurological: Positive for weakness  Objective:  Vitals:    03/07/22 1411   BP: 106/64   BP Location: Left arm   Patient Position: Sitting   Cuff Size: Adult   Pulse: (!) 108   SpO2: 97%   Weight: 48 6 kg (107 lb 3 2 oz)   Height: 5' 5" (1 651 m)     Body mass index is 17 84 kg/m²  Physical Exam  Constitutional:       Appearance: Normal appearance  She is well-developed  HENT:      Head: Normocephalic and atraumatic  Right Ear: Tympanic membrane normal    Eyes:      Pupils: Pupils are equal, round, and reactive to light  Neck:      Thyroid: No thyromegaly  Cardiovascular:      Rate and Rhythm: Normal rate and regular rhythm  Heart sounds: Normal heart sounds  Pulmonary:      Effort: Pulmonary effort is normal       Breath sounds: Normal breath sounds  Abdominal:      Palpations: Abdomen is soft  Musculoskeletal:         General: Normal range of motion  Cervical back: Neck supple  Lymphadenopathy:      Cervical: No cervical adenopathy  Skin:     General: Skin is warm and dry  Neurological:      Mental Status: She is alert and oriented to person, place, and time     Psychiatric:         Mood and Affect: Mood normal          Behavior: Behavior normal

## 2022-03-07 NOTE — TELEPHONE ENCOUNTER
TRANSFER CALL   Reason for patient call? Patient's son has questions about his mothers infusion appointment on 3/10 and the blood work she is supposed to be getting along with it    Patient's primary oncologist? Dr [facility]   Name of RN call was transferred to? Radha   Time call was transferred to RN? 3:51 pm   Any additional information to add, if applicable?     Informed patient that the nurse may not be available and they will reach voicemail, they should leave a message and call will be returned Yes

## 2022-03-10 NOTE — PROGRESS NOTES
Pt here for labs, offering no complaints  Right port accessed with positive blood return noted, labs drawn, port flushed and de-accessed without incident  AVS declined  Walked out in stable condition

## 2022-03-11 NOTE — TELEPHONE ENCOUNTER
meds update set to no print  Pt is going to decrease lipitor to 10mg daily as per Dr Elle Colón due to her lipid results

## 2022-03-14 NOTE — PROGRESS NOTES
Outpatient Oncology Nutrition Consultation   Type of Consult: Follow Up  Care Location: [facility]    Reason for referral: Palliative Care on 12/7/21 (reason for referral: pancreatic cancer and weight loss)  Nutrition Assessment:   Oncology Diagnosis & Treatments: Diagnosed with pancreatic cancer 10/25/21  · Began chemotherapy (neulasta, abraxane, gemzar) 11/8/21       Oncology History   Pancreatic adenocarcinoma ([facility] )   10/25/2021 Initial Diagnosis    Pancreatic adenocarcinoma ([facility] )     11/8/2021 -  Chemotherapy    pegfilgrastim (NEULASTA ONPRO), 6 mg, Subcutaneous, Once, 3 of 4 cycles  Administration: 6 mg (11/8/2021), 6 mg (12/6/2021), 6 mg (1/31/2022), 6 mg (2/14/2022)  paclitaxel protein-bound (ABRAXANE) IVPB, 100 mg/m2 = 157 mg (80 % of original dose 125 mg/m2), Intravenous, Once, 5 of 6 cycles  Dose modification: 100 mg/m2 (original dose 125 mg/m2, Cycle 1, Reason: Performance Status), 80 mg/m2 (original dose 125 mg/m2, Cycle 3, Reason: Performance Status)  Administration: 157 mg (11/8/2021), 157 mg (12/6/2021), 157 mg (12/20/2021), 126 mg (1/3/2022), 126 mg (1/17/2022), 126 mg (1/31/2022), 126 mg (2/14/2022), 126 mg (2/28/2022)  gemcitabine (GEMZAR) infusion, 1,000 mg/m2 = 1,570 mg, Intravenous, Once, 5 of 6 cycles  Dose modification: 800 mg/m2 (original dose 1,000 mg/m2, Cycle 3, Reason: Performance Status)  Administration: 1,570 mg (11/8/2021), 1,570 mg (12/6/2021), 1,570 mg (12/20/2021), 1,255 9 mg (1/3/2022), 1,255 9 mg (1/17/2022), 1,255 9 mg (1/31/2022), 1,255 9 mg (2/14/2022), 1,255 9 mg (2/28/2022)       Past Medical & Surgical Hx: tobacco use, HTN, HLD   Patient Active Problem List   Diagnosis    DDD (degenerative disc disease), lumbar    [facility]    Mixed hyperlipidemia    Allergic rhinitis    Impaired fasting glucose    Essential hypertension    Tobacco use    Osteoporosis    Reactive depression    Weight loss    Anemia    Drug-induced constipation    Anxiety    Spinal stenosis    Pancreat[facility] adenocarcinoma ([facility] )    Encounter for chemotherapy management    Mild protein-calorie malnutrition ([place] [address] )    At high risk for infection due to chemotherapy    Port-A-Cath in place    Medical marijuana use    Cancer related pain    Palliative care patient    Atrial fibrillation with RVR ([place] [address] )    Edema     Past Medical History:   Diagnosis Date    Anxiety     Chronic pancreatitis ([place] [address] )     Depression     Deviated nasal septum     H/O colonoscopy     6/7/10    Hyperlipidemia     Hypertension     Impaired fasting glucose     Spinal stenosis      Past Surgical History:   Procedure Laterality Date    APPENDECTOMY      CHOLECYSTECTOMY      EXPLORATORY LAPAROTOMY      IR PORT PLACEMENT  11/2/2021    TUBAL LIGATION         Review of Medications:   Vitamins, Supplements and Herbals: No, pt denies taking supplements    Current Outpatient Medications:     acetaminophen (TYLENOL) 500 mg tablet, Take 1 tablet (500 mg total) by mouth every 6 (six) hours as needed for mild pain, Disp: 50 tablet, Rfl: 0    aspirin 325 mg tablet, Take 1 tablet (325 mg total) by mouth daily, Disp: , Rfl:     atorvastatin (LIPITOR) 20 mg tablet, Take 0 5 tablets (10 mg total) by mouth daily, Disp: 90 tablet, Rfl: 0    diltiazem (CARDIZEM CD) 120 mg 24 hr capsule, Take 1 capsule (120 mg total) by mouth daily, Disp: 30 capsule, Rfl: 1    escitalopram (LEXAPRO) 20 mg tablet, Take 1 tablet (20 mg total) by mouth daily, Disp: 90 tablet, Rfl: 5    fluticasone (FLONASE) 50 mcg/act nasal spray, 2 sprays into each nostril daily, Disp: , Rfl:     furosemide (LASIX) 20 mg tablet, 1 bid prn edema, Disp: 60 tablet, Rfl: 5    gabapentin (Neurontin) 100 mg capsule, Take 1 capsule (100 mg total) by mouth 2 (two) times a day, Disp: 60 capsule, Rfl: 0     MG tablet, TAKE 1 TABLET (600 MG TOTAL) BY MOUTH 3 (THREE) TIMES A DAY, Disp: 270 tablet, Rfl: 3    metoprolol tartrate (LOPRESSOR) 25 mg tablet, Take 1 tablet (25 mg total) by mouth every 12 (twelve) hours, Disp: 60 tablet, Rfl: 1    ondansetron (ZOFRAN) 4 mg tablet, Take 1 tablet (4 mg total) by mouth every 8 (eight) hours as needed for nausea or vomiting, Disp: 30 tablet, Rfl: 0    oxyCODONE (ROXICODONE) 20 MG TABS, Take 0 5-1 tablets (10-20 mg total) by mouth every 4 (four) hours as needed for moderate pain Max Daily Amount: 120 mg, Disp: 90 tablet, Rfl: 0    pancrelipase, Lip-Prot-Amyl, (CREON) 12,000 units capsule, Take 12,000 units of lipase by mouth 3 (three) times a day with meals, Disp: 270 capsule, Rfl: 3    polyethylene glycol (MIRALAX) 17 g packet, Take 17 g by mouth daily as needed (Constipation), Disp: 20 each, Rfl: 0    prochlorperazine (COMPAZINE) 10 mg tablet, Take 1 tablet (10 mg total) by mouth every 6 (six) hours as needed for nausea or vomiting, Disp: 30 tablet, Rfl: 0    senna (SENOKOT) 8 6 MG tablet, Take 1 tablet (8 6 mg total) by mouth daily, Disp: 30 tablet, Rfl: 3  No current facility-administered medications for this visit      Facility-Administered Medications Ordered in Other Visits:     dexamethasone (DECADRON) 10 mg in sodium chloride 0 9 % 51 mL IVPB, 10 mg, Intravenous, Once, Edilberto Jiménez MD    gemcitabine (GEMZAR) 1,255 9 mg in sodium chloride 0 9 % 250 mL infusion, 800 mg/m2 (Treatment Plan Recorded), Intravenous, Once, Edilberto Jiménez MD    PACLitaxel protein bound (ABRAXANE) 126 mg in IVPB 25 2 mL, 80 mg/m2 (Treatment Plan Recorded), Intravenous, Once, Edilberto Jiménez MD    sodium chloride 0 9 % infusion, 20 mL/hr, Intravenous, Once, Edilberto Jiménez MD    Most Recent Lab Results:   Lab Results   Component Value Date    WBC 12 53 (H) 03/10/2022    NEUTROABS 9 86 (H) 03/10/2022    CHOLESTEROL 105 03/10/2022    TRIG 65 03/10/2022    HDL 48 (L) 03/10/2022    LDLCALC 44 03/10/2022    ALT 55 03/10/2022    AST 22 03/10/2022    ALB 3 4 (L) 03/10/2022    SODIUM 140 03/10/2022    SODIUM 139 02/25/2022    K 4 2 03/10/2022    K 4 2 02/25/2022     03/10/2022    BUN 17 03/10/2022    BUN 13 02/25/2022    CREATININE 0 87 03/10/2022    CREATININE 0 76 02/25/2022    EGFR 63 03/10/2022    GLUF 122 (H) 09/10/2021    GLUF 130 (H) 02/16/2021    GLUC 99 03/10/2022    HGBA1C 5 9 (H) 09/10/2021    HGBA1C 5 9 (H) 02/16/2021    HGBA1C 5 8 04/16/2019    CALCIUM 8 9 03/10/2022    MG 1 9 01/20/2022       Anthropometric Measurements:   Height: 66"  Ht Readings from Last 1 Encounters:   03/14/22 5' 5" (1 651 m)     Wt Readings from Last 20 Encounters:   03/14/22 51 kg (112 lb 6 4 oz)   03/07/22 48 6 kg (107 lb 3 2 oz)   03/03/22 51 kg (112 lb 8 oz)   02/28/22 50 8 kg (112 lb)   02/23/22 49 4 kg (109 lb)   02/23/22 49 4 kg (109 lb)   02/14/22 51 8 kg (114 lb 3 2 oz)   02/07/22 50 8 kg (112 lb)   01/31/22 50 kg (110 lb 3 2 oz)   01/28/22 51 kg (112 lb 8 oz)   01/27/22 50 3 kg (111 lb)   01/20/22 49 9 kg (110 lb)   01/19/22 50 3 kg (111 lb)   01/17/22 49 7 kg (109 lb 9 6 oz)   01/03/22 48 kg (105 lb 12 8 oz)   12/27/21 47 6 kg (105 lb)   12/20/21 49 7 kg (109 lb 9 1 oz)   12/07/21 50 8 kg (112 lb)   12/06/21 50 6 kg (111 lb 8 8 oz)   12/06/21 50 3 kg (111 lb)       Weight History:    Usual Weight: 145#    Oncology Nutrition-Anthropometrics      Nutrition from 3/14/2022 in [address] [place] [address] Nutrition from 2/14/2022 in [facility] Dietitian [place]   Patient age (years): 80 years 80 years   Patient (female) height (in): 77 in 77 in   Current Weight to be used for anthropometric calculations (lbs) 112 4 lbs 114 2 lbs   Current Weight to be used for anthropometric calculations (kg) 51 1 kg 51 9 kg   BMI: 18 1 18 4   IBW female: 130 lbs 130 lbs   IBW (kg) female: 59 1 kg 59 1 kg   IBW % (female) 86 5 % 87 8 %   Adjusted BW (female): 125 6 lbs 126 lbs   Adjusted BW kg (female): 57 1 kg 57 3 kg   % weight change after 1 week: 4 9 % 2 %   Weight change after 1 week (lbs) 5 2 lbs 2 2 lbs   % weight change after 1 month: -1 6 % 4 2 %   Weight change after 1 month (lbs) -1 8 lbs 4 6 lbs   % weight change after 3 months: 2 6 % 2 1 %   Weight change after 3 months (lbs) 2 8 lbs 2 4 lbs          Nutrition-Focused Physical Findings: severe muscle depletion ([place]) - hollowing/scooping/depression    Food/Nutrition-Related History & Client/Social History:    Current Nutrition Impact Symptoms:  [] Nausea -has zofran  [x] Reduced Appetite -improved during second week after chemo  [] Acid Reflux    [] Vomiting  [x] Unintended Wt Loss  [] Malabsorption    [] Diarrhea  [] Unintended Wt Gain  [] Dumping Syndrome    [] Constipation -takes Senna daily and Miralax every other day; prune juice helps  [] Thick Mucous/Secretions  [x] Abdominal Pain   -with food intake  -takes creon    [x] Dysgeusia (Altered Taste) -starts to improve during second week after chemo  [] Xerostomia (Dry Mouth)  [] Gas    [x] Dysosmia (Altered Smell)  [] Thrush  [] Difficulty Chewing    [] Oral Mucositis (Sore Mouth)  [x] Fatigue  [] Hyperglycemia -hba1c 5 9% 9/10/21; BG 99mg/dL on 3/10/22    [] Odynophagia  [] Esophagitis  [] Other:    [] Dysphagia  [] Early Satiety  [] No Problems Eating      Food Allergies & Intolerances: no    Current Diet: Regular Diet, No Restrictions  Current Nutrition Intake: Increased since last visit  Appetite: Fair   Nutrition Route: PO  Oral Care: brushes BID  Activity level: ADL, goes to work 2 days a week      25 Hr Diet Recall:   Breakfast: oatmeal made with whole milk and butter, sweetened with monkfruit, blueberries or cheerios   Snack: cookies or chocolate candies OR peanut butter   Lunch: usually has to force herself to eat something, will eat toast or PB sandwich  Dinner: shake and baked pork chops, and mashed potatoes; will have fish 2 nights/ week with creamed spinach; beef bearnaise with asparagus  Snack: blueberries     Beverages: iced tea mixed with bottled water, coffee with [employer] creamer (12oz x1)   Supplements:  None      Oncology Nutrition-Estimated Needs      Nutrition from 3/14/2022 in [facility] Nutrition from 2022 in [facility] Dietitian Alfred[place]   Weight type used IBW IBW   Weight in kilograms (kg) used for estimated needs 59 1 kg 59 1 kg   Energy needs formula:  30-35 kcal/kg 30-35 kcal/kg   Energy needs based on 30 kcal/k kcal 1773 kcal   Energy needs based on 35 kcal/k kcal 2068 kcal   Protein needs formula: 1 2-1 5 g/kg 1 2-1 5 g/kg   Protein needs based on 1 2 g/k g 71 g   Protein needs based on 1 5 g/kg 89 g 89 g   Fluid needs formula: 30-35 mL/kg 30-35 mL/kg   Fluid needs based on 30 mL/kg 1773 mL 1773 mL   Fluid needs in ounces 60 oz 60 oz   Fluid needs based on 35 mL/kg 2069 mL 2069 mL   Fluid needs in ounces 70 oz 70 oz           Discussion & Intervention:   Victor M Hernandez was evaluated today for an RD follow up regarding wt loss and poor po intake  Victor M Hernandez is currently undergoing tx for pancreatic cancer  Today Victor M Hernandez explains that she is doing okay  She reports that her appetite has increased slightly  Her weight has also increased over the past week  She continues to struggle with constipation, but her regimen of Senna and Miralax help to control this  She will also use prune juice if need be  Provided handout with recipes to aid with relieving constipation  She is appreciative of conversation today  Moving forward, Victor M Hernandez will continue current nutrition plan of care  Materials Provided: Recipes for Constipation   All questions and concerns addressed during todays visit  Victor M Hernandez has RD contact information  Nutrition Diagnosis:    Increased Nutrient Needs (kcal & pro) related to increased demand for nutrients and disease state as evidenced by cancer dx and pt undergoing tx for cancer   Underweight related to physiological causes as evidenced by BMI 18 1    Monitoring & Evaluation:   Goals:  · weight maintenance/stabilization  · adequate nutrition impact symptom management  · pt to meet >/=75% estimated nutrition needs daily    · Progress Towards Goals: Progressing    Nutrition Rx & Recommendations:  · Diet: High Calorie, High Protein (for high calorie foods see pages 52-53, and for high protein foods see pages 49-51 in your Eating Hints book)  · Small, frequent snacks may be easier to tolerate than 3 large daily meals  Aim for 5-6 small meals per day; eat every 2-3 hours  · Include protein at all meals/snacks  · Avoid high-fat, greasy or fried foods  · Avoid high sugar foods (ex  >10 grams of sugar per serving)  · Include a variety of foods (as tolerated/allowed by diet)  · Stay hydrated by sipping fluids of choice/tolerance throughout the day, (48-64 oz  Per day)  Avoid carbonated beverages  · Liquid nutrition may be better tolerated than solids at times  · Alter food choices and eating patterns to accommodate changing needs  · Light physical activity (such as walking) is encouraged, as able/tolerated  · Refer to Eating Hints book and Diet and Nutrition book for other meal/snack ideas and symptom management  · Weigh yourself regularly  If you notice weight loss, make an effort to increase your daily food/calorie intake  If you continue to notice loss after these efforts, reach out to your dietitian to establish a plan to stabilize weight  · High calorie foods to try: peanut butter, cream soups, butter, cheese  (see pages 52-53 in your Eating Hints book)   · Continue prune juice for constipation as needed  Can also try recipes on handout provided at today's visit       Follow Up Plan: During infusion 3/28/22  Recommend Referral to Other Providers: none at this time

## 2022-03-14 NOTE — PATIENT INSTRUCTIONS
Nutrition Rx & Recommendations:  · Diet: High Calorie, High Protein (for high calorie foods see pages 52-53, and for high protein foods see pages 49-51 in your Eating Hints book)  · Small, frequent snacks may be easier to tolerate than 3 large daily meals  Aim for 5-6 small meals per day; eat every 2-3 hours  · Include protein at all meals/snacks  · Avoid high-fat, greasy or fried foods  · Avoid high sugar foods (ex  >10 grams of sugar per serving)  · Include a variety of foods (as tolerated/allowed by diet)  · Stay hydrated by sipping fluids of choice/tolerance throughout the day, (48-64 oz  Per day)  Avoid carbonated beverages  · Liquid nutrition may be better tolerated than solids at times  · Alter food choices and eating patterns to accommodate changing needs  · Light physical activity (such as walking) is encouraged, as able/tolerated  · Refer to Eating Hints book and Diet and Nutrition book for other meal/snack ideas and symptom management  · Weigh yourself regularly  If you notice weight loss, make an effort to increase your daily food/calorie intake  If you continue to notice loss after these efforts, reach out to your dietitian to establish a plan to stabilize weight  · High calorie foods to try: peanut butter, cream soups, butter, cheese  (see pages 52-53 in your Eating Hints book)   · Continue prune juice for constipation as needed  Can also try recipes on handout provided at today's visit       Follow Up Plan: During infusion 3/28/22  Recommend Referral to Other Providers: none at this time

## 2022-03-14 NOTE — PROGRESS NOTES
Spoke with Lele Evans RN regarding mention of skipping Neulasta on-pro in [place] PA note on 3/3/22  Kristi Valencia stated per Lottie Olea pt is to not receive Neulasta on-pro today, for this cycle only  Neulasta on-pro removed from treatment plan

## 2022-03-22 NOTE — PROGRESS NOTES
Outpatient Follow-Up - Palliative and Supportive Care   Nemesio Larose 80 y o  female [id number]    Assessment & Plan  Problem List Items Addressed This Visit        Digestive    Drug-induced constipation    Pancreatic adenocarcinoma (HCC) - Primary    Relevant Medications    gabapentin (Neurontin) 100 mg capsule    ondansetron (ZOFRAN) 4 mg tablet    prochlorperazine (COMPAZINE) 10 mg tablet       Other    Mild protein-calorie malnutrition (HCC)    Medical marijuana use    Cancer related pain    Relevant Medications    gabapentin (Neurontin) 100 mg capsule    Palliative care patient    Relevant Medications    gabapentin (Neurontin) 100 mg capsule      Other Visit Diagnoses     Nausea        Relevant Medications    ondansetron (ZOFRAN) 4 mg tablet    prochlorperazine (COMPAZINE) 10 mg tablet        #symptom management  #cancer-related pain  #chronic pain   - continue APAP 500 mg PO Q6H PRN              - max daily 3000 mg   - continue oxy-IR 10-20 mg PO Q4H PRN              - use of 4-5 tabs/day   - decrease gabapentin 100 mg PO QHS     #OIC   - continue miralax 17 g PO QDaily PRN   - continue senna 1 tab PO QDaily PRN     #nausea   - continue ondansetron 4 mg PO Q8H PRN [first line]   - continue prochlorperazine 10 mg PO Q6H PRN [second line]     #depression   - continue escitalopram 20 mg PO QDaily     #decreased appetite   - recent small volume weight gain [3 lb over the past 1-2 weeks]   - attributes to altered taste ["tastes like cardboard"]   - discussed seasoning food to taste   - continue Ensure 1-2 bottles/day   - continue MMJ product     #MMJ   - certified with [email or web address] card   - undesired effect with vaporization modality, discussed other modalities including tincture, pills, patches [patient will discuss with pharmacist at dispensary]     #goals of care   - treatment focused care with no limitations at this time   - next cycle scheduled for next week   - CT imaging to evaluate treatment/plan 04/01/2022   - reports hope to take a road trip with son prior to initiation of next cycle    #psychosocial support   - emotional support provided   - ,  [80]   - one adult son [Bill]      Next [facility] Follow up in 4 weeks  Controlled Substance Review    [place] or [place]  reviewed: No red flags were identified; safe to proceed with prescription  [place] Tier[place] PDMP Review       Value Time User    PDMP Reviewed  Yes (P)  3/22/2022  1:44 PM Citlali Monk MD          Medications adjusted this encounter:  Requested Prescriptions     Signed Prescriptions Disp Refills    gabapentin (Neurontin) 100 mg capsule  0     Sig: Take 1 capsule (100 mg total) by mouth daily at bedtime    ondansetron (ZOFRAN) 4 mg tablet 30 tablet 0     Sig: Take 1 tablet (4 mg total) by mouth every 8 (eight) hours as needed for nausea or vomiting    prochlorperazine (COMPAZINE) 10 mg tablet 30 tablet 0     Sig: Take 1 tablet (10 mg total) by mouth every 6 (six) hours as needed for nausea or vomiting     No orders of the defined types were placed in this encounter  Medications Discontinued During This Encounter   Medication Reason    ondansetron (ZOFRAN) 4 mg tablet Reorder    prochlorperazine (COMPAZINE) 10 mg tablet Reorder    gabapentin (Neurontin) 100 mg capsule          Lyla Yadav was seen today for symptoms and planning cares related to above illnesses  I have reviewed the patient's controlled substance dispensing history in the Prescription Drug Monitoring Program in compliance with the [facility] regulations before prescribing any controlled substances  They are invited to continue to follow with us  If there are questions or concerns, please contact us through our clinic/answering service 24 hours a day, seven days a week      Citlali Monk MD  [facility]        Visit Information    Accompanied By: Sobeida Sauceda    Source of History: Self, Family member, Medical record    History Limitations: None      History of Present Illness    Susu Gutierrez is a 80 y o  female who presents in follow up of symptoms related to pancreatic adenocarcinoma  Pertinent issues include: symptom management, pain, neoplasm related, assessment of goals of care, advance care planning  Patient reports overall doing well, however, today is a "good day", but patient reports many "bad days"  Persistent epigastric/back pain, attributed to known cancer-related pain + chronic back pain, intermittent, use of oxy-IR 20 mg x 4-5 tabs/day  Addition of gabapentin therapy QHS helpful to initiate sleep  Intermittent nausea without vomiting, antiemetic therapy helps  Appetite remains poor in the setting of altered taste, completes 1-2 meals/day, however 3 lb weight gain over the past 1-2 weeks [no reported worsening of LE edema]  BM every other day, regular, use of bowel regimen  Sleep fragmented, however, obtains "a lot of sleep" throughout the day  Past medical, surgical, social, and family histories are reviewed and pertinent updates are made  Review of Systems   Constitutional: Positive for decreased appetite and malaise/fatigue  Negative for chills, fever and weight loss  HENT: Negative for congestion  Eyes: Negative for visual disturbance  Cardiovascular: Negative for chest pain and syncope  Respiratory: Negative for shortness of breath  Musculoskeletal: Positive for back pain  Negative for falls  Gastrointestinal: Positive for constipation and nausea  Negative for abdominal pain and vomiting  Genitourinary: Negative for frequency  Neurological: Negative for headaches  Psychiatric/Behavioral: The patient has insomnia  All other systems reviewed and are negative          Vital Signs    /60 (BP Location: Left arm, Cuff Size: Standard)   Pulse 70   Temp 97 9 °F (36 6 °C) (Temporal)   Resp 18   Wt 50 9 kg (112 lb 3 2 oz)   SpO2 99%   BMI 18 67 kg/m²     Physical Exam and Objective Data  Physical Exam  Vitals and nursing note reviewed  Constitutional:       General: She is awake  Appearance: She is not diaphoretic  Comments: Sitting up comfortably in NAD  Non-toxic appearing   HENT:      Head: Normocephalic and atraumatic  Right Ear: External ear normal       Left Ear: External ear normal       Nose: No rhinorrhea  Eyes:      Comments: No gaze preference   Cardiovascular:      Rate and Rhythm: Normal rate  Pulmonary:      Effort: No tachypnea, accessory muscle usage or respiratory distress  Comments: Completes full sentences without difficulty  Musculoskeletal:      Cervical back: Normal range of motion  Neurological:      General: No focal deficit present  Mental Status: She is alert and oriented to person, place, and time  Psychiatric:         Attention and Perception: Attention normal          Mood and Affect: Mood and affect normal          Speech: Speech normal          Cognition and Memory: Cognition and memory normal            Radiology and Laboratory:  I personally reviewed and interpreted the following results:    Most Recent COVID-19 Results:  Lab Results   Component Value Date/Time    SARSCOV2 Negative 01/19/2022 06:20 PM       Most Recent Lab Work:  Lab Results   Component Value Date/Time    SODIUM 140 03/10/2022 03:14 PM    K 4 2 03/10/2022 03:14 PM    BUN 17 03/10/2022 03:14 PM    CREATININE 0 87 03/10/2022 03:14 PM    GLUC 99 03/10/2022 03:14 PM     Lab Results   Component Value Date/Time    AST 22 03/10/2022 03:14 PM    ALT 55 03/10/2022 03:14 PM    ALB 3 4 (L) 03/10/2022 03:14 PM     Lab Results   Component Value Date/Time    HGB 10 0 (L) 03/10/2022 03:14 PM    WBC 12 53 (H) 03/10/2022 03:14 PM     03/10/2022 03:14 PM    INR 1 09 01/19/2022 06:20 PM    PTT 29 01/19/2022 06:20 PM       Most Recent Imaging [last 30 days]:  No results found      35 minutes was spent face to face with Mis Quinteros and her son with greater than 50% of the time spent in counseling or coordination of care including discussions of provided medical updates, discussed palliative care, determined goals of care, determined social/family support, discussed plans of care, discussed symptom management, provided psychosocial support  Medication refills  PDMP Reviewed  All of the patient's or agent's questions were answered during this discussion

## 2022-03-23 NOTE — PROGRESS NOTES
Chart review completed  Patient seemingly compliant with attending scheduled appts and treatments  Unsuccessful attempt at reaching patient's son today for BPCI-A follow up call  Left name, call back number and message requesting return call of this CM  This CM will continue to monitor electronically via chart review, outreach and [facility]

## 2022-03-25 NOTE — PROGRESS NOTES
Hematology/Oncology Progress Note    Date of Service: 3/25/2022    [facility]   [address]  [place] [id number]    Hem/Onc Problem List:     Pancreatic cancer, on chemotherapy  Chief Complaint:    Establish care because Dr Caryle Service no longer working for [facility]    Assessment/Plan:     I personally reviewed the lab results, image studies results and other specialties/physicians consult notes and recommendations  I shared the findings with patient and family, discussed the diagnosis and management plan as below  1  Locally advanced pancreatic cancer, unresectable disease  Patient started neoadjuvant chemotherapy with gemcitabine and Abraxane on November 8, 2021 with Neulasta support  Patient has had 5 cycle chemotherapy with dose dose reduction(64% of original dose for Abraxane, 80% of origin dose for gemcitabine)  Overall, the patient tolerated treatment fairly well  Restaging CT scan on January 18, 2022 showed stable disease  CA 19-9 37 in March 10, 2022  37 on March 24, 2022  Patient will continue current management plan without further dose modification  I will check CBC, CMP, CA 19-9  Restaging CT scan chest abdomen pelvis is scheduled on April 1, 2022   2   Anemia with hemoglobin 10-11 likely due to chemotherapy  Hemoglobin stable  Patient is asymptomatic  We continue to monitor patient  PRBC transfusion if hemoglobin less than 7   3  Elevated alkaline phosphatase due to chemotherapy  Monitoring  4  Cancer associated pain that has been managed by palliative care  Patient continue oxycodone as needed  5  Constipation due to narcotic use  Patient takes MiraLax as needed  Greater than 50% of this 40 minute visit was spent in counseling, as detailed above  Disclaimer: This document was prepared using [employer] technology   If a word or phrase is confusing, or does not make sense, this is likely due to recognition error which was not discovered during the providers review  If you believe an error has occurred, please Contact me through [employer] service for joe? cation  AJCC 8th Edition Cancer Stage :      Cancer Staging  No matching staging information was found for the patient  Hematology/Oncology History:   1  Locally advanced Pancreatic adenocarcinoma   - 10/2021: presented with weight loss, imaging study showed 5 cm pancreatic body mass   Cancer marker CA 19-9 = 171  Needle biopsy on 10/13/2021, results showed   suspicious for pancreatic adenocarcinoma   Cancer encasing SMV, not a candidate for surgery     - MRI did not show metastatic disease      - 11/2021:  Plan to start new adjuvant treatment  Gemcitabine 1000 mg / m2 /Abraxane 100 mg / m2 + onpro , day 1 day 15;  guardian test, no MSI high detected  - cycle 1 day 15 cancel due to elevation of alkaline phosphatase,?  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        - 3/2022: WBC elevated at 42K without infection  Likely Onpro related  Will hold onpro once after next treatment  · CA 19-9 on March 10, 2022-36  · March 24, 2022, CA 19-9 37      2   Mother had history of multiple myeloma, maternal grandmother history of breast cancer          Oncology History   Pancreatic adenocarcinoma ([facility] [address] )   10/25/2021 Initial Diagnosis     Pancreatic adenocarcinoma ([facility] [address] )      11/8/2021 -  Chemotherapy     pegfilgrastim (Seamus Goldsmith), 6 mg, Subcutaneous, Once, 4 of 5 cycles  Administration: 6 mg (11/8/2021), 6 mg (12/6/2021), 6 mg (1/31/2022), 6 mg (2/14/2022)  paclitaxel protein-bound (ABRAXANE) IVPB, 100 mg/m2 = 157 mg (80 % of original dose 125 mg/m2), Intravenous, Once, 5 of 6 cycles  Dose modification: 100 mg/m2 (original dose 125 mg/m2, Cycle 1, Reason: Performance Status), 80 mg/m2 (original dose 125 mg/m2, Cycle 3, Reason: Performance Status)  Administration: 157 mg (11/8/2021), 157 mg (12/6/2021), 157 mg (12/20/2021), 126 mg (1/3/2022), 126 mg (1/17/2022), 126 mg (1/31/2022), 126 mg (2/14/2022), 126 mg (2/28/2022)  gemcitabine (GEMZAR) infusion, 1,000 mg/m2 = 1,570 mg, Intravenous, Once, 5 of 6 cycles  Dose modification: 800 mg/m2 (original dose 1,000 mg/m2, Cycle 3, Reason: Performance Status)  Administration: 1,570 mg (11/8/2021), 1,570 mg (12/6/2021), 1,570 mg (12/20/2021), 1,255 9 mg (1/3/2022), 1,255 9 mg (1/17/2022), 1,255 9 mg (1/31/2022), 1,255 9 mg (2/14/2022), 1,255 9 mg (2/28/2022)            History of Present Illiness:   Surekha Ayala is a 80 y o  female with the above-noted HemOnc history who is here  to establish care because Dr Chani Iglesias no longer working for [facility]  Patient has unresectable locally advanced pancreatic cancer on neoadjuvant chemotherapy with Abraxane and gemcitabine started on November 8, 2021 with Neulasta support  Patient has had 5 cycles chemotherapy with dose reduction at 64% of Abraxane original does, 80% of gemcitabine original does  CT scan in January 2022 showed stable disease  CA 19-9 decreased to 37 on March 24, 2022  Restaging CT scan is scheduled in April 1, 2022  Overall, the patient tolerated treatment fairly well  Patient takes oxycodone for pain under care of palliative team     Patient feels fine  No fever or chills  No exertional chest pain, diaphoresis or shortness  of breath  No cough and phlegm, no hemoptysis  Patient denied nausea  and vomiting  No abdominal pain    No diarrhea or constipation  No  symptoms  No headache or blurred vision  No seizure activity  Appetite good  No significant weight loss or weight gain  The patient denies bleeding anywhere  ROS: A 12-point of review of systems is obtained and other than the above is noncontributory  Objective:   VITALS:   /64 (BP Location: Left arm, Cuff Size: Adult)   Pulse 61   Temp 97 8 °F (36 6 °C)   Resp 16   Ht 5' 5" (1 651 m)   Wt 51 3 kg (113 lb)   SpO2 97%   BMI 18 80 kg/m²     Physical EXAM:  General:  Alert, cooperative, no distress, appears stated age  Head:  Normocephalic, without obvious abnormality, atraumatic  Eyes:  Conjunctivae/corneas clear  PERRL, EOMs intact  No evidence of conjunctivitis     Throat: Lips, mucosa, and tongue normal   No bleeding from mouth  Neck: Supple, symmetrical    Lungs:   Clear to auscultation bilaterally  Respiratory effort easy, nonlabored    Heart:  Regular rate and rhythm, S1, S2 normal, no murmur  Abdomen:   Soft, non-tender,nondistended  Bowel sounds normal  No masses,  No organomegaly  Extremities:  Lymphatics: Extremities normal, atraumatic, no cyanosis or edema  No cervical, axillary or inguinal adenopathy   Skin: Skin color, texture, turgor normal  No rashes  Neurologic: A&Ox4   No focal neuro deficits       Allergies   Allergen Reactions    Morphine Abdominal Pain       Past Medical History:   Diagnosis Date    Anxiety     Chronic pancreatitis ([address] )     Depression     Deviated nasal septum     H/O colonoscopy     6/7/10    Hyperlipidemia     Hypertension     Impaired fasting glucose     Spinal stenosis        Past Surgical History:   Procedure Laterality Date    APPENDECTOMY      CHOLECYSTECTOMY      EXPLORATORY LAPAROTOMY      IR PORT PLACEMENT  11/2/2021    TUBAL LIGATION         Family History   Problem Relation Age of Onset    Multiple myeloma Mother     Coronary artery disease Father     Stroke Father         CVA Social History     Socioeconomic History    Marital status:      Spouse name: Not on file    Number of children: 1    Years of education: Not on file    Highest education level: Not on file   Occupational History    Not on file   Tobacco Use    Smoking status: Current Every Day Smoker     Packs/day: 0 50     Types: Cigarettes    Smokeless tobacco: Never Used   Vaping Use    Vaping Use: Never used   Substance and Sexual Activity    Alcohol use: Never     Comment: wine with tea  a couple of times a week     Drug use: No    Sexual activity: Not Currently   Other Topics Concern    Not on file   Social History Narrative    Not on file     Social Determinants of Health     Financial Resource Strain: Not on file   Food Insecurity: No Food Insecurity    Worried About Running Out of Food in the Last Year: Never true    Rayna of Food in the Last Year: Never true   Transportation Needs: No Transportation Needs    Lack of Transportation (Medical): No    Lack of Transportation (Non-Medical): No   Physical Activity: Not on file   Stress: Not on file   Social Connections: Unknown    Frequency of Communication with Friends and Family: More than three times a week    Frequency of Social Gatherings with Friends and Family: More than three times a week    Attends Scientology Services: Not on file   [employer] of Clubs or Organizations: Not on file    Attends Club or Organization Meetings: Not on file    Marital Status:     Intimate Partner Violence: Not on file   Housing Stability: Low Risk     Unable to Pay for Housing in the Last Year: No    Number of Places Lived in the Last Year: 1    Unstable Housing in the Last Year: No       Current Outpatient Medications   Medication Sig Dispense Refill    acetaminophen (TYLENOL) 500 mg tablet Take 1 tablet (500 mg total) by mouth every 6 (six) hours as needed for mild pain 50 tablet 0    aspirin 325 mg tablet Take 1 tablet (325 mg total) by mouth daily      atorvastatin (LIPITOR) 20 mg tablet Take 0 5 tablets (10 mg total) by mouth daily 90 tablet 0    diltiazem (CARDIZEM CD) 120 mg 24 hr capsule Take 1 capsule (120 mg total) by mouth daily 30 capsule 1    escitalopram (LEXAPRO) 20 mg tablet Take 1 tablet (20 mg total) by mouth daily 90 tablet 5    fluticasone (FLONASE) 50 mcg/act nasal spray 2 sprays into each nostril daily      furosemide (LASIX) 20 mg tablet 1 bid prn edema 60 tablet 5    gabapentin (Neurontin) 100 mg capsule Take 1 capsule (100 mg total) by mouth daily at bedtime  0     MG tablet TAKE 1 TABLET (600 MG TOTAL) BY MOUTH 3 (THREE) TIMES A  tablet 3    metoprolol tartrate (LOPRESSOR) 25 mg tablet Take 1 tablet (25 mg total) by mouth every 12 (twelve) hours 60 tablet 1    ondansetron (ZOFRAN) 4 mg tablet Take 1 tablet (4 mg total) by mouth every 8 (eight) hours as needed for nausea or vomiting 30 tablet 0    oxyCODONE (ROXICODONE) 20 MG TABS Take 0 5-1 tablets (10-20 mg total) by mouth every 4 (four) hours as needed for moderate pain Max Daily Amount: 120 mg 90 tablet 0    pancrelipase, Lip-Prot-Amyl, (CREON) 12,000 units capsule Take 12,000 units of lipase by mouth 3 (three) times a day with meals 270 capsule 3    polyethylene glycol (MIRALAX) 17 g packet Take 17 g by mouth daily as needed (Constipation) 20 each 0    prochlorperazine (COMPAZINE) 10 mg tablet Take 1 tablet (10 mg total) by mouth every 6 (six) hours as needed for nausea or vomiting 30 tablet 0    senna (SENOKOT) 8 6 MG tablet Take 1 tablet (8 6 mg total) by mouth daily 30 tablet 3     No current facility-administered medications for this visit  (Not in a hospital admission)      DATA REVIEW:    Pathology Result:    Final Diagnosis   Date Value Ref Range Status   10/13/2021   Final    A B C  Pancreas, pancreatic body mass (ThinPrep, smears and cell block preparations ):       -  Malignant  (PSC Category VI) -See note         - Adenocarcinoma  Satisfactory for evaluation  Note:   - The case was sent to Dr Swapna Aguilrea For an expert consultation upon Dr Nancy Miranda's request    - The amended diagnosis is discussed with Dr Ghada Fatima via [facility] Text  at 1:22 pm on 10/29/21, who finds no clinical impact on patient care  Image Results: They are reviewed and documented in Hematology/Oncology history    Holter monitor  INDICATIONS: atrial fibrillation     DESCRIPTION OF FINDINGS:  The patient was predominantly in atrial fibrillation (75 2% of total   beats) throughout the study  The average heart rate was 96 beats per minute  The heart rate ranged from   54 to 169 beats per minute  Ventricular ectopic activity consisted of 833 beats (0 3% of total beats)  There was no sustained or nonsustained ventricular tachycardia  Supraventricular ectopic activity consisted of 140 beats (0 1% of total   beats)  There were two 3-beat atrial runs  The longest R-R interval was 2 2 seconds during a conversion pause    No significant cardiac symptoms reported  Impression:   The patient was predominantly in atrial fibrillation (75 2% of total   beats) throughout the study  Average heart rate was 96 beats per minute  Rare ectopy was noted  The longest R-R interval was 2 2 seconds during a conversion pause  No significant cardiac symptoms reported  -----  Keven Arnold MD [facility]        LABS:  Lab data are reviewed and documented in HemOnc history       Recent Results (from the past 48 hour(s))   Cancer antigen 19-9    Collection Time: 03/24/22 10:16 AM   Result Value Ref Range    CA 19-9 37 (H) 0 - 35 U/mL   CBC and differential    Collection Time: 03/24/22 10:16 AM   Result Value Ref Range    WBC 8 48 4 31 - 10 16 Thousand/uL    RBC 3 23 (L) 3 81 - 5 12 Million/uL    Hemoglobin 10 5 (L) 11 5 - 15 4 g/dL    Hematocrit 32 7 (L) 34 8 - 46 1 %     (H) 82 - 98 fL    MCH 32 5 26 8 - 34 3 pg    MCHC 32 1 31 4 - 37 4 g/dL    RDW 15 7 (H) 11 6 - 15 1 %    MPV 9 5 8 9 - 12 7 fL    Platelets 675 316 - 424 Thousands/uL    nRBC 0 /100 WBCs    Neutrophils Relative 60 43 - 75 %    Immat GRANS % 0 0 - 2 %    Lymphocytes Relative 25 14 - 44 %    Monocytes Relative 10 4 - 12 %    Eosinophils Relative 5 0 - 6 %    Basophils Relative 0 0 - 1 %    Neutrophils Absolute 5 12 1 85 - 7 62 Thousands/µL    Immature Grans Absolute 0 03 0 00 - 0 20 Thousand/uL    Lymphocytes Absolute 2 08 0 60 - 4 47 Thousands/µL    Monocytes Absolute 0 83 0 17 - 1 22 Thousand/µL    Eosinophils Absolute 0 39 0 00 - 0 61 Thousand/µL    Basophils Absolute 0 03 0 00 - 0 10 Thousands/µL   Comprehensive metabolic panel    Collection Time: 03/24/22 10:16 AM   Result Value Ref Range    Sodium 142 136 - 145 mmol/L    Potassium 4 0 3 5 - 5 3 mmol/L    Chloride 108 100 - 108 mmol/L    CO2 27 21 - 32 mmol/L    ANION GAP 7 4 - 13 mmol/L    BUN 11 5 - 25 mg/dL    Creatinine 0 65 0 60 - 1 30 mg/dL    Glucose 107 65 - 140 mg/dL    Calcium 8 7 8 3 - 10 1 mg/dL    Corrected Calcium 9 3 8 3 - 10 1 mg/dL    AST 27 5 - 45 U/L    ALT 61 12 - 78 U/L    Alkaline Phosphatase 184 (H) 46 - 116 U/L    Total Protein 6 4 6 4 - 8 2 g/dL    Albumin 3 3 (L) 3 5 - 5 0 g/dL    Total Bilirubin 0 22 0 20 - 1 00 mg/dL    eGFR 84 ml/min/1 73sq m             Tosha Weiss MD  3/25/2022, 11:15 AM

## 2022-03-28 NOTE — PROGRESS NOTES
Outpatient Oncology Nutrition Consultation   Type of Consult: Follow Up  Care Location: [facility]    Reason for referral: Palliative Care on 12/7/21 (reason for referral: pancreatic cancer and weight loss)  Nutrition Assessment:   Oncology Diagnosis & Treatments: Diagnosed with pancreatic cancer 10/25/21  · Began chemotherapy (neulasta, abraxane, gemzar) 11/8/21       Oncology History   Pancreatic adenocarcinoma ([facility] )   10/25/2021 Initial Diagnosis    Pancreatic adenocarcinoma ([facility] )     11/8/2021 -  Chemotherapy    pegfilgrastim (NEULASTA ONPRO), 6 mg, Subcutaneous, Once, 4 of 4 cycles  Administration: 6 mg (11/8/2021), 6 mg (12/6/2021), 6 mg (1/31/2022), 6 mg (2/14/2022)  paclitaxel protein-bound (ABRAXANE) IVPB, 100 mg/m2 = 157 mg (80 % of original dose 125 mg/m2), Intravenous, Once, 6 of 6 cycles  Dose modification: 100 mg/m2 (original dose 125 mg/m2, Cycle 1, Reason: Performance Status), 80 mg/m2 (original dose 125 mg/m2, Cycle 3, Reason: Performance Status)  Administration: 157 mg (11/8/2021), 157 mg (12/6/2021), 157 mg (12/20/2021), 126 mg (1/3/2022), 126 mg (1/17/2022), 126 mg (1/31/2022), 126 mg (2/14/2022), 126 mg (2/28/2022), 126 mg (3/14/2022)  gemcitabine (GEMZAR) infusion, 1,000 mg/m2 = 1,570 mg, Intravenous, Once, 6 of 6 cycles  Dose modification: 800 mg/m2 (original dose 1,000 mg/m2, Cycle 3, Reason: Performance Status)  Administration: 1,570 mg (11/8/2021), 1,570 mg (12/6/2021), 1,570 mg (12/20/2021), 1,255 9 mg (1/3/2022), 1,255 9 mg (1/17/2022), 1,255 9 mg (1/31/2022), 1,255 9 mg (2/14/2022), 1,255 9 mg (2/28/2022), 1,255 9 mg (3/14/2022)       Past Medical & Surgical Hx: tobacco use, HTN, HLD   Patient Active Problem List   Diagnosis    DDD (degenerative disc disease), lumbar    Chronic pancreatitis ([facility] )    Mixed hyperlipidemia    Allergic rhinitis    Impaired fasting glucose    Essential hypertension    Tobacco use    Osteoporosis    Reactive depression    Weight loss    Anemia    Drug-induced constipation    Anxiety    Spinal stenosis    Pancreatic adenocarcinoma ([facility] )    Encounter for chemotherapy management    Mild protein-calorie malnutrition ([facility] )    At high risk for infection due to chemotherapy    Port-A-Cath in place    Medical marijuana use    Cancer related pain    Palliative care patient    Atrial fibrillation with RVR ([facility] )    Edema     Past Medical History:   Diagnosis Date    Anxiety     Chronic pancreatitis ([facility] )     Depression     Deviated nasal septum     H/O colonoscopy     6/7/10    Hyperlipidemia     Hypertension     Impaired fasting glucose     Spinal stenosis      Past Surgical History:   Procedure Laterality Date    APPENDECTOMY      CHOLECYSTECTOMY      EXPLORATORY LAPAROTOMY      IR PORT PLACEMENT  11/2/2021    TUBAL LIGATION         Review of Medications:   Vitamins, Supplements and Herbals: No, pt denies taking supplements    Current Outpatient Medications:     acetaminophen (TYLENOL) 500 mg tablet, Take 1 tablet (500 mg total) by mouth every 6 (six) hours as needed for mild pain, Disp: 50 tablet, Rfl: 0    aspirin 325 mg tablet, Take 1 tablet (325 mg total) by mouth daily, Disp: , Rfl:     atorvastatin (LIPITOR) 20 mg tablet, Take 0 5 tablets (10 mg total) by mouth daily, Disp: 90 tablet, Rfl: 0    diltiazem (CARDIZEM CD) 120 mg 24 hr capsule, Take 1 capsule (120 mg total) by mouth daily, Disp: 30 capsule, Rfl: 1    escitalopram (LEXAPRO) 20 mg tablet, Take 1 tablet (20 mg total) by mouth daily, Disp: 90 tablet, Rfl: 5    fluticasone (FLONASE) 50 mcg/act nasal spray, 2 sprays into each nostril daily, Disp: , Rfl:     furosemide (LASIX) 20 mg tablet, 1 bid prn edema, Disp: 60 tablet, Rfl: 5    gabapentin (Neurontin) 100 mg capsule, Take 1 capsule (100 mg total) by mouth daily at bedtime, Disp: , Rfl: 0     MG tablet, TAKE 1 TABLET (600 MG TOTAL) BY MOUTH 3 (THREE) TIMES A DAY, Disp: 270 tablet, Rfl: 3   metoprolol tartrate (LOPRESSOR) 25 mg tablet, Take 1 tablet (25 mg total) by mouth every 12 (twelve) hours, Disp: 60 tablet, Rfl: 1    ondansetron (ZOFRAN) 4 mg tablet, Take 1 tablet (4 mg total) by mouth every 8 (eight) hours as needed for nausea or vomiting, Disp: 30 tablet, Rfl: 0    oxyCODONE (ROXICODONE) 20 MG TABS, Take 0 5-1 tablets (10-20 mg total) by mouth every 4 (four) hours as needed for moderate pain Max Daily Amount: 120 mg, Disp: 90 tablet, Rfl: 0    pancrelipase, Lip-Prot-Amyl, (CREON) 12,000 units capsule, Take 12,000 units of lipase by mouth 3 (three) times a day with meals, Disp: 270 capsule, Rfl: 3    polyethylene glycol (MIRALAX) 17 g packet, Take 17 g by mouth daily as needed (Constipation), Disp: 20 each, Rfl: 0    prochlorperazine (COMPAZINE) 10 mg tablet, Take 1 tablet (10 mg total) by mouth every 6 (six) hours as needed for nausea or vomiting, Disp: 30 tablet, Rfl: 0    senna (SENOKOT) 8 6 MG tablet, Take 1 tablet (8 6 mg total) by mouth daily, Disp: 30 tablet, Rfl: 3  No current facility-administered medications for this visit      Facility-Administered Medications Ordered in Other Visits:     dexamethasone (DECADRON) 10 mg in sodium chloride 0 9 % 51 mL IVPB, 10 mg, Intravenous, Once, Joanne Ewing MD, Last Rate: 153 mL/hr at 03/28/22 1313, 10 mg at 03/28/22 1313    gemcitabine (GEMZAR) 1,255 9 mg in sodium chloride 0 9 % 250 mL infusion, 800 mg/m2 (Treatment Plan Recorded), Intravenous, Once, Joanne Ewing MD    PACLitaxel protein bound (ABRAXANE) 126 mg in IVPB 25 2 mL, 80 mg/m2 (Treatment Plan Recorded), Intravenous, Once, Joanne Ewing MD    pegfilgrastim (NEULASTA ONPRO) subcutaneous injection kit 6 mg, 6 mg, Subcutaneous, Once, Joanne Ewing MD    Most Recent Lab Results:   Lab Results   Component Value Date    WBC 8 48 03/24/2022    NEUTROABS 5 12 03/24/2022    CHOLESTEROL 105 03/10/2022    TRIG 65 03/10/2022    HDL 48 (L) 03/10/2022    LDLCALC 44 03/10/2022    ALT 61 03/24/2022    AST 27 03/24/2022    ALB 3 3 (L) 03/24/2022    SODIUM 142 03/24/2022    SODIUM 140 03/10/2022    K 4 0 03/24/2022    K 4 2 03/10/2022     03/24/2022    BUN 11 03/24/2022    BUN 17 03/10/2022    CREATININE 0 65 03/24/2022    CREATININE 0 87 03/10/2022    EGFR 84 03/24/2022    GLUF 122 (H) 09/10/2021    GLUF 130 (H) 02/16/2021    GLUC 107 03/24/2022    HGBA1C 5 9 (H) 09/10/2021    HGBA1C 5 9 (H) 02/16/2021    HGBA1C 5 8 04/16/2019    CALCIUM 8 7 03/24/2022    MG 1 9 01/20/2022       Anthropometric Measurements:   Height: 66"  Ht Readings from Last 1 Encounters:   03/28/22 5' 5" (1 651 m)     Wt Readings from Last 20 Encounters:   03/28/22 50 5 kg (111 lb 6 4 oz)   03/25/22 51 3 kg (113 lb)   03/22/22 50 9 kg (112 lb 3 2 oz)   03/14/22 51 kg (112 lb 6 4 oz)   03/07/22 48 6 kg (107 lb 3 2 oz)   03/03/22 51 kg (112 lb 8 oz)   02/28/22 50 8 kg (112 lb)   02/23/22 49 4 kg (109 lb)   02/23/22 49 4 kg (109 lb)   02/14/22 51 8 kg (114 lb 3 2 oz)   02/07/22 50 8 kg (112 lb)   01/31/22 50 kg (110 lb 3 2 oz)   01/28/22 51 kg (112 lb 8 oz)   01/27/22 50 3 kg (111 lb)   01/20/22 49 9 kg (110 lb)   01/19/22 50 3 kg (111 lb)   01/17/22 49 7 kg (109 lb 9 6 oz)   01/03/22 48 kg (105 lb 12 8 oz)   12/27/21 47 6 kg (105 lb)   12/20/21 49 7 kg (109 lb 9 1 oz)       Weight History:    Usual Weight: 145#    Oncology Nutrition-Anthropometrics      Nutrition from 3/28/2022 in [address] [place] [address] Nutrition from 3/14/2022 in [address] [place] [address]   Patient age (years): 80 years 80 years   Patient (female) height (in): 77 in 77 in   Current Weight to be used for anthropometric calculations (lbs) 111 4 lbs 112 4 lbs   Current Weight to be used for anthropometric calculations (kg) 50 6 kg 51 1 kg   BMI: 18 18 1   IBW female: 130 lbs 130 lbs   IBW (kg) female: 59 1 kg 59 1 kg   IBW % (female) 85 7 % 86 5 %   Adjusted BW (female): 125 4 lbs 125 6 lbs   Adjusted BW kg (female): 57 kg 57 1 kg   % weight change after 1 week: -0 7 % 4 9 %   Weight change after 1 week (lbs) -0 8 lbs 5 2 lbs   % weight change after 1 month: -0 5 % -1 6 %   Weight change after 1 month (lbs) -0 6 lbs -1 8 lbs   % weight change after 3 months: 6 1 % 2 6 %   Weight change after 3 months (lbs) 6 4 lbs 2 8 lbs          Nutrition-Focused Physical Findings: severe muscle depletion ([place]) - hollowing/scooping/depression    Food/Nutrition-Related History & Client/Social History:    Current Nutrition Impact Symptoms:  [] Nausea -has zofran  [x] Reduced Appetite -improved during second week after chemo  [] Acid Reflux    [] Vomiting  [x] Unintended Wt Loss  [] Malabsorption    [] Diarrhea  [] Unintended Wt Gain  [] Dumping Syndrome    [] Constipation -takes Senna daily and Miralax every other day; prune juice helps  [] Thick Mucous/Secretions  [x] Abdominal Pain   -with food intake  -takes creon    [x] Dysgeusia (Altered Taste) -starts to improve during second week after chemo  [] Xerostomia (Dry Mouth)  [] Gas    [x] Dysosmia (Altered Smell)  [] Thrush  [] Difficulty Chewing    [] Oral Mucositis (Sore Mouth)  [x] Fatigue  [] Hyperglycemia -hba1c 5 9% 9/10/21; BG 107mg/dL on 3/24/22    [] Odynophagia  [] Esophagitis  [] Other:    [] Dysphagia  [] Early Satiety  [] No Problems Eating      Food Allergies & Intolerances: no    Current Diet: Regular Diet, No Restrictions  Current Nutrition Intake: Unchanged from last visit  Appetite: Fair   Nutrition Route: PO  Oral Care: brushes BID  Activity level: ADL, goes to work 2 days a week      24 Hr Diet Recall:   Breakfast: oatmeal made with whole milk and butter, sweetened with monkfruit, blueberries or cheerios   Snack: cookies or chocolate candies OR peanut butter   Lunch: usually has to force herself to eat something, will eat toast or PB sandwich  Dinner: shake and baked pork chops, and mashed potatoes; will have fish 2 nights/ week with creamed spinach; beef bearnaise with asparagus  Snack: blueberries     Beverages: iced tea mixed with bottled water, coffee with [employer] creamer (12oz x1)   Supplements:    None      Oncology Nutrition-Estimated Needs      Nutrition from 3/28/2022 in [address] [place] [address] Nutrition from 3/14/2022 in [facility]   Weight type used IBW IBW   Weight in kilograms (kg) used for estimated needs 50 6 kg 59 1 kg   Energy needs formula:  30-35 kcal/kg 30-35 kcal/kg   Energy needs based on 30 kcal/k kcal 1773 kcal   Energy needs based on 35 kcal/k kcal 2068 kcal   Protein needs formula: 1 2-1 5 g/kg 1 2-1 5 g/kg   Protein needs based on 1 2 g/k g 71 g   Protein needs based on 1 5 g/kg 76 g 89 g   Fluid needs formula: 30-35 mL/kg 30-35 mL/kg   Fluid needs based on 30 mL/kg 1515 mL 1773 mL   Fluid needs in ounces 51 oz 60 oz   Fluid needs based on 35 mL/kg 1768 mL 2069 mL   Fluid needs in ounces 60 oz 70 oz           Discussion & Intervention:   Charlotte Lin was evaluated today for an RD follow up regarding wt loss and poor po intake  Charlotte Lin is currently undergoing tx for pancreatic cancer  Today Charlotte Lin explains that she is doing okay  She reports no changes in her appetite/intakes at this time  Her weight has decreased sightly over the past week  Overall no significant changes to her weight  Charlotte Lin does not have any changes to report today and no nutrition questions/concerns, but is appreciative of conversation today  Moving forward, Charlotte Lin will continue current nutrition plan of care  Materials Provided: not applicable  All questions and concerns addressed during todays visit  Charlotte Lin has RD contact information  Nutrition Diagnosis:    Increased Nutrient Needs (kcal & pro) related to increased demand for nutrients and disease state as evidenced by cancer dx and pt undergoing tx for cancer   Underweight related to physiological causes as evidenced by BMI 18    Monitoring & Evaluation: Goals:  · weight maintenance/stabilization  · adequate nutrition impact symptom management  · pt to meet >/=75% estimated nutrition needs daily    · Progress Towards Goals: Progressing    Nutrition Rx & Recommendations:  · Diet: High Calorie, High Protein (for high calorie foods see pages 52-53, and for high protein foods see pages 49-51 in your Eating Hints book)  · Small, frequent snacks may be easier to tolerate than 3 large daily meals  Aim for 5-6 small meals per day; eat every 2-3 hours  · Include protein at all meals/snacks  · Avoid high-fat, greasy or fried foods  · Avoid high sugar foods (ex  >10 grams of sugar per serving)  · Include a variety of foods (as tolerated/allowed by diet)  · Stay hydrated by sipping fluids of choice/tolerance throughout the day, (48-64 oz  Per day)  Avoid carbonated beverages  · Liquid nutrition may be better tolerated than solids at times  · Alter food choices and eating patterns to accommodate changing needs  · Light physical activity (such as walking) is encouraged, as able/tolerated  · Refer to Eating Hints book and Diet and Nutrition book for other meal/snack ideas and symptom management  · Weigh yourself regularly  If you notice weight loss, make an effort to increase your daily food/calorie intake  If you continue to notice loss after these efforts, reach out to your dietitian to establish a plan to stabilize weight  · High calorie foods to try: peanut butter, cream soups, butter, cheese  (see pages 52-53 in your Eating Hints book)   · Continue prune juice for constipation as needed      Follow Up Plan: During infusion 4/11/22  Recommend Referral to Other Providers: none at this time

## 2022-03-28 NOTE — PATIENT INSTRUCTIONS
Nutrition Rx & Recommendations:  · Diet: High Calorie, High Protein (for high calorie foods see pages 52-53, and for high protein foods see pages 49-51 in your Eating Hints book)  · Small, frequent snacks may be easier to tolerate than 3 large daily meals  Aim for 5-6 small meals per day; eat every 2-3 hours  · Include protein at all meals/snacks  · Avoid high-fat, greasy or fried foods  · Avoid high sugar foods (ex  >10 grams of sugar per serving)  · Include a variety of foods (as tolerated/allowed by diet)  · Stay hydrated by sipping fluids of choice/tolerance throughout the day, (48-64 oz  Per day)  Avoid carbonated beverages  · Liquid nutrition may be better tolerated than solids at times  · Alter food choices and eating patterns to accommodate changing needs  · Light physical activity (such as walking) is encouraged, as able/tolerated  · Refer to Eating Hints book and Diet and Nutrition book for other meal/snack ideas and symptom management  · Weigh yourself regularly  If you notice weight loss, make an effort to increase your daily food/calorie intake  If you continue to notice loss after these efforts, reach out to your dietitian to establish a plan to stabilize weight  · High calorie foods to try: peanut butter, cream soups, butter, cheese  (see pages 52-53 in your Eating Hints book)   · Continue prune juice for constipation as needed      Follow Up Plan: During infusion 4/11/22  Recommend Referral to Other Providers: none at this time

## 2022-03-28 NOTE — PROGRESS NOTES
Pt to clinic for abraxane and gemzar, offers no complaints today  Confirmed with Evelyn Ortez RN pt is to continue treatment without any changes per Dr Avelina Diamond  Pt tolerated infusions without complications  Following infusions pt's son questioned if pt is to still receive Neulasta  Radha stated per Dr Avelina Diamond pt is not to receive Neulasta on-pro  Neulasta not applied to pt  Pt aware of next appointment, port de-accessed, avs declined

## 2022-03-28 NOTE — PROGRESS NOTES
I went to apply the Neulasta On Pro and the pt's son states "I don't think she is supposed to get that because it made her white blood cell count go too high " I messaged Radha to double check and she confirmed with Dr Dontae Wright that the patient is NOT supposed receive the neulasta  Sherly Dire will remove from plan at this time  Medication was not applied to patient

## 2022-04-01 NOTE — TELEPHONE ENCOUNTER
Pt walked in requesting a refill of pancrelipase, Lip-Prot-Amyl, (CREON) 12,000 units capsule - pt is down to 3 pills and takes a pill after each meal      Med refill requested earlier and is pended  Please notify pt once script sent in

## 2022-04-06 NOTE — PROGRESS NOTES
Chart review completed today, MSW has not had contact with pt since Dec 2021  OncSW episode to be closed as of today, MSW will remain available to pt and her family as needed moving forward

## 2022-04-08 NOTE — PROGRESS NOTES
Outside records through [facility] requested and refreshed  Chart review completed  BPCI-A follow up call placed to patient this afternoon  She reports with a lot of effort from palliative care, PCP and her son they were able to get Creon reduced from $1500 every 3 months down to $600 every 3 months  CM informed patient that medicare bundle program will soon close and this will be the final outreach of this CM  Patient encouraged to call CM beyond closure date as needed for assistance  Patient verbalized understanding and wrote down call back number of CM  At time of call, she offers no complaints and states she is "taking it one day at a time "  She reports compliance with taking all prescribed medications and acknowledges her future appts  This CM will continue to monitor via chart review throughout bundle episode

## 2022-04-11 NOTE — PROGRESS NOTES
Pt here for chemotherapy, abraxane and gemzar, offering no complaints  Right port accessed with positive blood return noted throughout treatment  Tolerated infusion without incident  Port flushed and de-accessed  AVS given  This is the last treatment scheduled  Reached out to Marianna Williamson RN to make her aware     Walked out in stable condition

## 2022-04-11 NOTE — PROGRESS NOTES
Outpatient Oncology Nutrition Consultation   Type of Consult: Follow Up  Care Location: [facility]    Reason for referral: Palliative Care on 12/7/21 (reason for referral: pancreatic cancer and weight loss)  Nutrition Assessment:   Oncology Diagnosis & Treatments: Diagnosed with pancreatic cancer 10/25/21  · Began chemotherapy (neulasta, abraxane, gemzar) 11/8/21       Oncology History   Pancreatic adenocarcinoma ([facility] )   10/25/2021 Initial Diagnosis    Pancreatic adenocarcinoma ([facility] )     11/8/2021 -  Chemotherapy    pegfilgrastim (NEULASTA ONPRO), 6 mg, Subcutaneous, Once, 4 of 4 cycles  Administration: 6 mg (11/8/2021), 6 mg (12/6/2021), 6 mg (1/31/2022), 6 mg (2/14/2022), 6 mg (3/28/2022)  paclitaxel protein-bound (ABRAXANE) IVPB, 100 mg/m2 = 157 mg (80 % of original dose 125 mg/m2), Intravenous, Once, 6 of 6 cycles  Dose modification: 100 mg/m2 (original dose 125 mg/m2, Cycle 1, Reason: Performance Status), 80 mg/m2 (original dose 125 mg/m2, Cycle 3, Reason: Performance Status)  Administration: 157 mg (11/8/2021), 157 mg (12/6/2021), 157 mg (12/20/2021), 126 mg (1/3/2022), 126 mg (1/17/2022), 126 mg (1/31/2022), 126 mg (2/14/2022), 126 mg (2/28/2022), 126 mg (3/14/2022), 126 mg (3/28/2022)  gemcitabine (GEMZAR) infusion, 1,000 mg/m2 = 1,570 mg, Intravenous, Once, 6 of 6 cycles  Dose modification: 800 mg/m2 (original dose 1,000 mg/m2, Cycle 3, Reason: Performance Status)  Administration: 1,570 mg (11/8/2021), 1,570 mg (12/6/2021), 1,570 mg (12/20/2021), 1,255 9 mg (1/3/2022), 1,255 9 mg (1/17/2022), 1,255 9 mg (1/31/2022), 1,255 9 mg (2/14/2022), 1,255 9 mg (2/28/2022), 1,255 9 mg (3/14/2022), 1,255 9 mg (3/28/2022)       Past Medical & Surgical Hx: tobacco use, HTN, HLD   Patient Active Problem List   Diagnosis    DDD (degenerative disc disease), lumbar    Chronic pancreatitis ([facility] )    Mixed hyperlipidemia    Allergic rhinitis    Impaired fasting glucose    Essential hypertension    Tobacco use  Osteoporosis    Reactive depression    Weight loss    Anemia    Drug-induced constipation    Anxiety    Spinal stenosis    [facility]    Encounter for chemotherapy management    Mild protein-calorie malnutrition ([facility] )    At high risk for infection due to chemotherapy    Port-A-Cath in place    Medical marijuana use    Cancer related pain    Palliative care patient    Atrial fibrillation with RVR ([facility] )    Edema     Past Medical History:   Diagnosis Date    Anxiety     Chronic pancreatitis ([facility] )     Depression     Deviated nasal septum     H/O colonoscopy     6/7/10    Hyperlipidemia     Hypertension     Impaired fasting glucose     Spinal stenosis      Past Surgical History:   Procedure Laterality Date    APPENDECTOMY      CHOLECYSTECTOMY      EXPLORATORY LAPAROTOMY      IR PORT PLACEMENT  11/2/2021    TUBAL LIGATION         Review of Medications:   Vitamins, Supplements and Herbals: No, pt denies taking supplements    Current Outpatient Medications:     acetaminophen (TYLENOL) 500 mg tablet, Take 1 tablet (500 mg total) by mouth every 6 (six) hours as needed for mild pain, Disp: 50 tablet, Rfl: 0    aspirin 325 mg tablet, Take 1 tablet (325 mg total) by mouth daily, Disp: , Rfl:     atorvastatin (LIPITOR) 20 mg tablet, Take 0 5 tablets (10 mg total) by mouth daily, Disp: 90 tablet, Rfl: 0    diltiazem (CARDIZEM CD) 120 mg 24 hr capsule, Take 1 capsule (120 mg total) by mouth daily, Disp: 30 capsule, Rfl: 1    escitalopram (LEXAPRO) 20 mg tablet, Take 1 tablet (20 mg total) by mouth daily, Disp: 90 tablet, Rfl: 5    fluticasone (FLONASE) 50 mcg/act nasal spray, 2 sprays into each nostril daily, Disp: , Rfl:     furosemide (LASIX) 20 mg tablet, 1 bid prn edema, Disp: 60 tablet, Rfl: 5    gabapentin (Neurontin) 100 mg capsule, Take 1 capsule (100 mg total) by mouth daily at bedtime, Disp: , Rfl: 0     MG tablet, TAKE 1 TABLET (600 MG TOTAL) BY MOUTH 3 (THREE) TIMES A DAY, Disp: 270 tablet, Rfl: 3    metoprolol tartrate (LOPRESSOR) 25 mg tablet, Take 1 tablet (25 mg total) by mouth every 12 (twelve) hours, Disp: 60 tablet, Rfl: 1    ondansetron (ZOFRAN) 4 mg tablet, Take 1 tablet (4 mg total) by mouth every 8 (eight) hours as needed for nausea or vomiting, Disp: 30 tablet, Rfl: 0    oxyCODONE (ROXICODONE) 20 MG TABS, Take 0 5-1 tablets (10-20 mg total) by mouth every 4 (four) hours as needed for moderate pain Max Daily Amount: 120 mg, Disp: 90 tablet, Rfl: 0    pancrelipase, Lip-Prot-Amyl, (CREON) 12,000 units capsule, Take 12,000 units of lipase by mouth 3 (three) times a day with meals, Disp: 270 capsule, Rfl: 3    polyethylene glycol (MIRALAX) 17 g packet, Take 17 g by mouth daily as needed (Constipation), Disp: 20 each, Rfl: 0    prochlorperazine (COMPAZINE) 10 mg tablet, Take 1 tablet (10 mg total) by mouth every 6 (six) hours as needed for nausea or vomiting, Disp: 30 tablet, Rfl: 0    senna (SENOKOT) 8 6 MG tablet, Take 1 tablet (8 6 mg total) by mouth daily, Disp: 30 tablet, Rfl: 3  No current facility-administered medications for this visit      Facility-Administered Medications Ordered in Other Visits:     dexamethasone (DECADRON) 10 mg in sodium chloride 0 9 % 51 mL IVPB, 10 mg, Intravenous, Once, Selena Caceres MD, Last Rate: 153 mL/hr at 04/11/22 1338, 10 mg at 04/11/22 1338    gemcitabine (GEMZAR) 1,255 9 mg in sodium chloride 0 9 % 250 mL infusion, 800 mg/m2 (Treatment Plan Recorded), Intravenous, Once, Selena Caceres MD    PACLitaxel protein bound (ABRAXANE) 126 mg in IVPB 25 2 mL, 80 mg/m2 (Treatment Plan Recorded), Intravenous, Once, Selena Caceres MD    sodium chloride 0 9 % infusion, 20 mL/hr, Intravenous, Once, Selena Caceres MD    Most Recent Lab Results:   Lab Results   Component Value Date    WBC 6 91 04/07/2022    NEUTROABS 4 31 04/07/2022    CHOLESTEROL 105 03/10/2022    TRIG 65 03/10/2022    HDL 48 (L) 03/10/2022    [address] 44 03/10/2022    ALT 73 04/07/2022    AST 29 04/07/2022    ALB 3 5 04/07/2022    SODIUM 140 04/07/2022    SODIUM 142 03/24/2022    K 4 1 04/07/2022    K 4 0 03/24/2022     04/07/2022    BUN 9 04/07/2022    BUN 11 03/24/2022    CREATININE 0 75 04/07/2022    CREATININE 0 65 03/24/2022    EGFR 76 04/07/2022    GLUF 122 (H) 09/10/2021    GLUF 130 (H) 02/16/2021    GLUC 180 (H) 04/07/2022    HGBA1C 5 9 (H) 09/10/2021    HGBA1C 5 9 (H) 02/16/2021    HGBA1C 5 8 04/16/2019    CALCIUM 9 0 04/07/2022    MG 1 9 01/20/2022       Anthropometric Measurements:   Height: 66"  Ht Readings from Last 1 Encounters:   04/11/22 5' 5" (1 651 m)     Wt Readings from Last 20 Encounters:   04/11/22 48 2 kg (106 lb 3 2 oz)   03/28/22 50 5 kg (111 lb 6 4 oz)   03/25/22 51 3 kg (113 lb)   03/22/22 50 9 kg (112 lb 3 2 oz)   03/14/22 51 kg (112 lb 6 4 oz)   03/07/22 48 6 kg (107 lb 3 2 oz)   03/03/22 51 kg (112 lb 8 oz)   02/28/22 50 8 kg (112 lb)   02/23/22 49 4 kg (109 lb)   02/23/22 49 4 kg (109 lb)   02/14/22 51 8 kg (114 lb 3 2 oz)   02/07/22 50 8 kg (112 lb)   01/31/22 50 kg (110 lb 3 2 oz)   01/28/22 51 kg (112 lb 8 oz)   01/27/22 50 3 kg (111 lb)   01/20/22 49 9 kg (110 lb)   01/19/22 50 3 kg (111 lb)   01/17/22 49 7 kg (109 lb 9 6 oz)   01/03/22 48 kg (105 lb 12 8 oz)   12/27/21 47 6 kg (105 lb)       Weight History:    Usual Weight: 145#    Oncology Nutrition-Anthropometrics      Nutrition from 4/11/2022 in [address] [place] [address] Dietitian [place] Nutrition from 3/28/2022 in [address] [place] [address] Dietitian [place]   Patient age (years): 80 years 80 years   Patient (female) height (in): 77 in 77 in   Current Weight to be used for anthropometric calculations (lbs) 106 2 lbs 111 4 lbs   Current Weight to be used for anthropometric calculations (kg) 48 3 kg 50 6 kg   BMI: 17 1 18   IBW female: 130 lbs 130 lbs   IBW (kg) female: 59 1 kg 59 1 kg   IBW % (female) 81 7 % 85 7 %   Adjusted BW (female): 124 lbs 125 4 lbs   Adjusted BW kg (female): 56 4 kg 57 kg   % weight change after 1 week: -- -0 7 %   Weight change after 1 week (lbs) -- -0 8 lbs   % weight change after 1 month: -5 5 % -0 5 %   Weight change after 1 month (lbs) -6 2 lbs -0 6 lbs   % weight change after 3 months: -3 1 % 6 1 %   Weight change after 3 months (lbs) -3 4 lbs 6 4 lbs          Nutrition-Focused Physical Findings: severe muscle depletion ([place]) - hollowing/scooping/depression    Food/Nutrition-Related History & Client/Social History:    Current Nutrition Impact Symptoms:  [] Nausea -has zofran  [x] Reduced Appetite  [] Acid Reflux    [] Vomiting  [x] Unintended Wt Loss -significant x1 month  [] Malabsorption    [] Diarrhea  [] Unintended Wt Gain  [] Dumping Syndrome    [] Constipation -takes Senna daily and Miralax every other day; prune juice helps  [] Thick Mucous/Secretions  [x] Abdominal Pain   -with food intake  -takes creon    [x] Dysgeusia (Altered Taste)   [] Xerostomia (Dry Mouth)  [] Gas    [x] Dysosmia (Altered Smell)  [] Thrush  [] Difficulty Chewing    [] Oral Mucositis (Sore Mouth)  [x] Fatigue  [] Hyperglycemia -hba1c 5 9% 9/10/21; BG 180mg/dL on 4/7/22    [] Odynophagia  [] Esophagitis  [] Other:    [] Dysphagia  [] Early Satiety  [] No Problems Eating      Food Allergies & Intolerances: no    Current Diet: Regular Diet, No Restrictions  Current Nutrition Intake: Decreased since last visit  Appetite: Poor  Nutrition Route: PO  Oral Care: brushes BID  Activity level: ADL, goes to work 2 days a week  24 Hr Diet Recall:   Eating very little past 2 days, prior to this was eating well       Beverages: iced tea mixed with bottled water, coffee with [employer] creamer (12oz x1)   Supplements:    None      Oncology Nutrition-Estimated Needs      Nutrition from 4/11/2022 in [address] [place] [address] Nutrition from 3/28/2022 in [address] [place] [address]   Weight type used Actual weight IBW   Weight in kilograms (kg) used for estimated needs -- 50 6 kg   Weight in kilograms (kg) used for estimated needs 48 3 kg --   Energy needs formula:  35-40 kcal/kg 30-35 kcal/kg   Energy needs based on 30 kcal/kg: -- 1519 kcal   Energy needs based on 35 kcal/kg: -- 1772 kcal   Energy needs based on 35 kcal/k kcal --   Energy needs based on 40 kcal/k kcal --   Protein needs formula: 1 5-2 g/kg 1 2-1 5 g/kg   Protein needs based on 1 2 g/kg: -- 61 g   Protein needs based on 1 5 g/kg -- 76 g   Protein needs based on 1 5 g/kg 72 g --   Protein needs based on 2 g/kg 97 g --   Fluid needs formula: 30-35 mL/kg 30-35 mL/kg   Fluid needs based on 30 mL/kg 1446 mL 1515 mL   Fluid needs in ounces 49 oz 51 oz   Fluid needs based on 35 mL/kg 1687 mL 1768 mL   Fluid needs in ounces 57 oz 60 oz           Discussion & Intervention:   Lissette Garcia was evaluated today for an RD follow up regarding wt loss and poor po intake  Lissette Garcia is currently undergoing tx for pancreatic cancer  Today Lissette Garcia explains that she has been eating very little for the past two days, but was eating well prior to this  She is feeling more hungry today than she has in the past two days  Her weight has decreased significantly  She plans to use Ensure to help gain some of the weight back that she has lost       Reviewed 24 hour recall, which revealed an inadequate po intake, and discussed ways to increase kcal, protein, and fluid intakes  Based on today's assessment, discussion included: MNT for: decreased appetite, adequate hydration & fluid choices, sipping on calorie containing beverages (examples include: adding whole milk or cream to coffee, oral nutrition supplements, juice, electrolyte replacement beverages, milk, etc ) and utilizing oral nutrition supplements  Moving forward, Lissette Garcia will make efforts to increase calorie, protein, fluid intake  Materials Provided: not applicable  All questions and concerns addressed during todays visit    Lissette Garcia has RD contact information  Nutrition Diagnosis:    Increased Nutrient Needs (kcal & pro) related to increased demand for nutrients and disease state as evidenced by cancer dx and pt undergoing tx for cancer   Underweight related to physiological causes as evidenced by BMI 17 1   Patient has clinical indicators (or ASPEN criteria) consistent with severe protein-calorie malnutrition in the context of Chronic Illness as evidenced by >5% wt loss in 1 month and </=75% energy intake vs  Estimated needs for >/=1 month  Monitoring & Evaluation:   Goals:  · weight maintenance/stabilization  · adequate nutrition impact symptom management  · pt to meet >/=75% estimated nutrition needs daily  · increase calorie, protein, fluid intake    · Progress Towards Goals: Progressing    Nutrition Rx & Recommendations:  · Diet: High Calorie, High Protein (for high calorie foods see pages 52-53, and for high protein foods see pages 49-51 in your Eating Hints book)  · Nutrition Supplements: Ensure/Boost   May use homemade shakes/smoothies as desired  If using a pre-made shake/bar, choose ones with >300 calories and >10 grams protein (ex  Ensure Complete, Ensure Enlive, Ensure Plus, Boost Plus, Boost Very High Calorie, etc )  · For lack of taste: Practice good oral care  Blend fresh fruits into shakes, ice cream or yogurt  Eat frozen fruits: grapes, chopped cantaloupe, watermelon  Select fresh vegetables (may be more appealing than canned/frozen)  Add extra flavor to foods: experiment with spices, salt & sweeteners, extra oil/butter, acidic foods; marinate meats (see pages 29-30 in your Eating Hints book)    · For appetite loss: try powdered or liquid nutrition supplements; eating by the clock every 2-3 hours; set a timer to remind yourself to eat, keep snacks nearby; add extra protein & calories to your diet; drink liquids in between meals, choose liquids that add calories; eat a bedtime snack; eat soft, cool or frozen foods; eat a larger meal when you feel well & rested; only sip small amounts of liquids during meals (see pages 10-12 in your Eating Hints book)  · Small, frequent snacks may be easier to tolerate than 3 large daily meals  Aim for 5-6 small meals per day; eat every 2-3 hours  · Include protein at all meals/snacks  · Avoid high-fat, greasy or fried foods  · Avoid high sugar foods (ex  >10 grams of sugar per serving)  · Include a variety of foods (as tolerated/allowed by diet)  · Stay hydrated by sipping fluids of choice/tolerance throughout the day, (48-64 oz  Per day)  Avoid carbonated beverages  · Liquid nutrition may be better tolerated than solids at times  · Alter food choices and eating patterns to accommodate changing needs  · Light physical activity (such as walking) is encouraged, as able/tolerated  · Refer to Eating Hints book and Diet and Nutrition book for other meal/snack ideas and symptom management  · Weigh yourself regularly  If you notice weight loss, make an effort to increase your daily food/calorie intake  If you continue to notice loss after these efforts, reach out to your dietitian to establish a plan to stabilize weight  · High calorie foods to try: peanut butter, cream soups, butter, cheese  (see pages 52-53 in your Eating Hints book)   · Continue prune juice for constipation as needed  Follow Up Plan:  Will coordinate with next infusion  Recommend Referral to Other Providers: none at this time

## 2022-04-11 NOTE — PATIENT INSTRUCTIONS
Nutrition Rx & Recommendations:  · Diet: High Calorie, High Protein (for high calorie foods see pages 52-53, and for high protein foods see pages 49-51 in your Eating Hints book)  · Nutrition Supplements: Ensure/Boost   May use homemade shakes/smoothies as desired  If using a pre-made shake/bar, choose ones with >300 calories and >10 grams protein (ex  Ensure Complete, Ensure Enlive, Ensure Plus, Boost Plus, Boost Very High Calorie, etc )  · For lack of taste: Practice good oral care  Blend fresh fruits into shakes, ice cream or yogurt  Eat frozen fruits: grapes, chopped cantaloupe, watermelon  Select fresh vegetables (may be more appealing than canned/frozen)  Add extra flavor to foods: experiment with spices, salt & sweeteners, extra oil/butter, acidic foods; marinate meats (see pages 29-30 in your Eating Hints book)  · For appetite loss: try powdered or liquid nutrition supplements; eating by the clock every 2-3 hours; set a timer to remind yourself to eat, keep snacks nearby; add extra protein & calories to your diet; drink liquids in between meals, choose liquids that add calories; eat a bedtime snack; eat soft, cool or frozen foods; eat a larger meal when you feel well & rested; only sip small amounts of liquids during meals (see pages 10-12 in your Eating Hints book)  · Small, frequent snacks may be easier to tolerate than 3 large daily meals  Aim for 5-6 small meals per day; eat every 2-3 hours  · Include protein at all meals/snacks  · Avoid high-fat, greasy or fried foods  · Avoid high sugar foods (ex  >10 grams of sugar per serving)  · Include a variety of foods (as tolerated/allowed by diet)  · Stay hydrated by sipping fluids of choice/tolerance throughout the day, (48-64 oz  Per day)  Avoid carbonated beverages  · Liquid nutrition may be better tolerated than solids at times  · Alter food choices and eating patterns to accommodate changing needs    · Light physical activity (such as walking) is encouraged, as able/tolerated  · Refer to Eating Hints book and Diet and Nutrition book for other meal/snack ideas and symptom management  · Weigh yourself regularly  If you notice weight loss, make an effort to increase your daily food/calorie intake  If you continue to notice loss after these efforts, reach out to your dietitian to establish a plan to stabilize weight  · High calorie foods to try: peanut butter, cream soups, butter, cheese  (see pages 52-53 in your Eating Hints book)   · Continue prune juice for constipation as needed  Follow Up Plan:  Will coordinate with next infusion  Recommend Referral to Other Providers: none at this time

## 2022-04-15 NOTE — PROGRESS NOTES
Hematology/Oncology Progress Note    Date of Service: 4/15/2022    [facility] HEMATOLOGY [facility]   [address]  [facility] [place] [address]    Hem/Onc Problem List:     Pancreatic cancer on chemotherapy  Chief Complaint:    Routine follow-up for management of pancreatic cancer    Assessment/Plan:     I personally reviewed the lab results, image studies results and other specialties/physicians consult notes and recommendations  I shared the findings with patient and family, discussed the diagnosis and management plan as below  1  Locally advanced pancreatic cancer, unresectable disease  Patient started neoadjuvant chemotherapy with gemcitabine and Abraxane on November 8, 2021 with Neulasta support  Patient has had 6 cycle chemotherapy with dose dose reduction(64% of original dose for Abraxane, 80% of origin dose for gemcitabine)  Overall, the patient tolerated treatment fairly well  Restaging CT scan on April 1, 2022 showed stable disease  CA 19-9 37 on April 7, 2022  I will order PET-CT scan and refer patient to Surgical Oncology to discuss surgery  2   Anemia with hemoglobin 10-11 likely due to chemotherapy  Hemoglobin stable  Patient is asymptomatic  We continue to monitor patient  PRBC transfusion if hemoglobin less than 7   3  Patient developed AFib with RVR after last CT scan with contrast in January 18, 2022  Patient and  family thought it was triggered by oral contract, which is kind of rare scenario  Patient refused further CT scan with contrast   4  Elevated alkaline phosphatase due to chemotherapy  Monitoring  5  Cancer associated pain that has been managed by palliative care  Patient continue oxycodone as needed  6  Long-term care plan  The patient is not sure whether she wants to continue chemotherapy or not  Patient and patient's son also thinking about comfort care to focus on life quality    Who we will discuss the goal of care after PET-CT scan  7  Follow-up, return to clinic in 4 weeks  Disclaimer: This document was prepared using [employer] Fluency Direct technology  If a word or phrase is confusing, or does not make sense, this is likely due to recognition error which was not discovered during the providers review  If you believe an error has occurred, please Contact me through [employer] service for joe? cation  AJCC 8th Edition Cancer Stage :      Cancer Staging  No matching staging information was found for the patient  Hematology/Oncology History:   1  Locally advanced Pancreatic adenocarcinoma   - 10/2021: presented with weight loss, imaging study showed 5 cm pancreatic body mass   Cancer marker CA 19-9 = 171  Needle biopsy on 10/13/2021, results showed   suspicious for pancreatic adenocarcinoma   Cancer encasing SMV, not a candidate for surgery     - MRI did not show metastatic disease      - 11/2021:  Plan to start new adjuvant treatment  Gemcitabine 1000 mg / m2 /Abraxane 100 mg / m2 + onpro , day 1 day 15;  guardian test, no MSI high detected  - cycle 1 day 15 cancel due to elevation of alkaline phosphatase,?  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        - 3/2022: WBC elevated at 42K without infection  Likely Onpro related  Will hold onpro once after next treatment  · CA 19-9 on March 10, 2022-36  · March 24, 2022, CA 19-9 37   · Cycle 6 chemotherapy on March 28, 2022    · April 1, 2022 CT scan without contrast for restaging:  New 3 mm nodule in the right middle lobe  Pancreatic cancer is not well appreciated      2  Mother had history of multiple myeloma, maternal grandmother history of breast cancer          Oncology History   Pancreatic adenocarcinoma ([facility] [address] )   10/25/2021 Initial Diagnosis     Pancreatic adenocarcinoma (HCC)      11/8/2021 -  Chemotherapy     pegfilgrastim (Jessica Gross), 6 mg, Subcutaneous, Once, 4 of 5 cycles  Administration: 6 mg (11/8/2021), 6 mg (12/6/2021), 6 mg (1/31/2022), 6 mg (2/14/2022)  paclitaxel protein-bound (ABRAXANE) IVPB, 100 mg/m2 = 157 mg (80 % of original dose 125 mg/m2), Intravenous, Once, 5 of 6 cycles  Dose modification: 100 mg/m2 (original dose 125 mg/m2, Cycle 1, Reason: Performance Status), 80 mg/m2 (original dose 125 mg/m2, Cycle 3, Reason: Performance Status)  Administration: 157 mg (11/8/2021), 157 mg (12/6/2021), 157 mg (12/20/2021), 126 mg (1/3/2022), 126 mg (1/17/2022), 126 mg (1/31/2022), 126 mg (2/14/2022), 126 mg (2/28/2022)  gemcitabine (GEMZAR) infusion, 1,000 mg/m2 = 1,570 mg, Intravenous, Once, 5 of 6 cycles  Dose modification: 800 mg/m2 (original dose 1,000 mg/m2, Cycle 3, Reason: Performance Status)  Administration: 1,570 mg (11/8/2021), 1,570 mg (12/6/2021), 1,570 mg (12/20/2021), 1,255 9 mg (1/3/2022), 1,255 9 mg (1/17/2022), 1,255 9 mg (1/31/2022), 1,255 9 mg (2/14/2022), 1,255 9 mg (2/28/2022)            History of Present Illiness:   Leonard Jessie is a 80 y o  female with the above-noted HemOnc history who is here  to discuss the CT scan result and management plan  Patient has unresectable locally advanced pancreatic cancer on neoadjuvant chemotherapy with Abraxane and gemcitabine started on November 8, 2021 with Neulasta support  Patient has had 6 cycles chemotherapy with dose reduction at 64% of Abraxane original does, 80% of gemcitabine original does  CT scan without contrast on April 1, 2022 showed stable disease    CA 19-9 decreased to 37  IV and p o  contrast was not given because of questionable allergy reaction before  Patient had a hard time after last cycle of chemotherapy because of generalized weakness and fatigue  Patient and patient's son discussed possible comfort care  Patient takes oxycodone for abdominal pain under care of palliative team     Patient feels tired  No fever or chills  No exertional chest pain, diaphoresis or shortness  of breath  No cough and phlegm, no hemoptysis  Patient denied nausea  and vomiting  She complained of abdominal pain  No diarrhea or constipation  No  symptoms  No headache or blurred vision  No seizure activity  Appetite good  No significant weight loss or weight gain  The patient denies bleeding anywhere  ROS: A 12-point of review of systems is obtained and other than the above is noncontributory  Objective:   VITALS:   /72 (BP Location: Left arm, Patient Position: Sitting, Cuff Size: Adult)   Pulse (!) 125   Temp 97 9 °F (36 6 °C)   Resp 16   Ht 5' 5" (1 651 m)   Wt 47 2 kg (104 lb)   SpO2 97%   BMI 17 31 kg/m²     Physical EXAM:  General:  Alert, cooperative, no distress, appears stated age  Head:  Normocephalic, without obvious abnormality  Eyes:  Conjunctivae/corneas clear  PERRL, EOMs intact  No evidence of conjunctivitis     Throat: Lips, mucosa, and tongue normal   No bleeding from mouth  Neck: Supple, symmetrical    Lungs:   Clear to auscultation bilaterally  Respiratory effort easy, nonlabored    Heart:  Regular rate and rhythm, S1, S2 normal, no murmur  Abdomen:   Soft, +tender,nondistended  Bowel sounds normal  No masses  Extremities:  Lymphatics: Extremities normal, atraumatic, no cyanosis or edema  No cervical, axillary or inguinal adenopathy   Skin: No rashes  Neurologic: A&Ox4   No focal neuro deficits       Allergies   Allergen Reactions    Morphine Abdominal Pain    Iv Contrast [Iodinated Diagnostic Agents] Palpitations       Past Medical History:   Diagnosis Date    Anxiety     Chronic pancreatitis ([facility] [address] )     Depression     Deviated nasal septum     H/O colonoscopy     6/7/10    Hyperlipidemia     Hypertension     Impaired fasting glucose     Spinal stenosis        Past Surgical History:   Procedure Laterality Date    APPENDECTOMY      CHOLECYSTECTOMY      EXPLORATORY LAPAROTOMY      IR PORT PLACEMENT  11/2/2021    TUBAL LIGATION         Family History   Problem Relation Age of Onset    Multiple myeloma Mother     Coronary artery disease Father     Stroke Father         CVA       Social History     Socioeconomic History    Marital status:      Spouse name: Not on file    Number of children: 1    Years of education: Not on file    Highest education level: Not on file   Occupational History    Not on file   Tobacco Use    Smoking status: Current Every Day Smoker     Packs/day: 0 50     Types: Cigarettes    Smokeless tobacco: Never Used   Vaping Use    Vaping Use: Never used   Substance and Sexual Activity    Alcohol use: Never     Comment: wine with tea  a couple of times a week     Drug use: No    Sexual activity: Not Currently   Other Topics Concern    Not on file   Social History Narrative    Not on file     Social Determinants of Health     Financial Resource Strain: Not on file   Food Insecurity: No Food Insecurity    Worried About Running Out of Food in the Last Year: Never true    Rayna of Food in the Last Year: Never true   Transportation Needs: No Transportation Needs    Lack of Transportation (Medical): No    Lack of Transportation (Non-Medical):  No   Physical Activity: Not on file   Stress: Not on file   Social Connections: Unknown    Frequency of Communication with Friends and Family: More than three times a week    Frequency of Social Gatherings with Friends and Family: More than three times a week    Attends [employer] Services: Not on file   [employer] of [employer]s or Organizations: Not on file    Attends Club or Organization Meetings: Not on file    Marital Status:     Intimate Partner Violence: Not on file   Housing Stability: Low Risk     Unable to Pay for Housing in the Last Year: No    Number of Places Lived in the Last Year: 1    Unstable Housing in the Last Year: No       Current Outpatient Medications   Medication Sig Dispense Refill    acetaminophen (TYLENOL) 500 mg tablet Take 1 tablet (500 mg total) by mouth every 6 (six) hours as needed for mild pain 50 tablet 0    aspirin 325 mg tablet Take 1 tablet (325 mg total) by mouth daily      atorvastatin (LIPITOR) 20 mg tablet Take 0 5 tablets (10 mg total) by mouth daily 90 tablet 0    diltiazem (CARDIZEM CD) 120 mg 24 hr capsule Take 1 capsule (120 mg total) by mouth daily 30 capsule 1    escitalopram (LEXAPRO) 20 mg tablet Take 1 tablet (20 mg total) by mouth daily 90 tablet 5    fluticasone (FLONASE) 50 mcg/act nasal spray 2 sprays into each nostril daily      furosemide (LASIX) 20 mg tablet 1 bid prn edema 60 tablet 5    gabapentin (Neurontin) 100 mg capsule Take 1 capsule (100 mg total) by mouth daily at bedtime  0     MG tablet TAKE 1 TABLET (600 MG TOTAL) BY MOUTH 3 (THREE) TIMES A  tablet 3    metoprolol tartrate (LOPRESSOR) 25 mg tablet Take 1 tablet (25 mg total) by mouth every 12 (twelve) hours 60 tablet 1    ondansetron (ZOFRAN) 4 mg tablet Take 1 tablet (4 mg total) by mouth every 8 (eight) hours as needed for nausea or vomiting 30 tablet 0    oxyCODONE (ROXICODONE) 20 MG TABS Take 0 5-1 tablets (10-20 mg total) by mouth every 4 (four) hours as needed for moderate pain Max Daily Amount: 120 mg 90 tablet 0    pancrelipase, Lip-Prot-Amyl, (CREON) 12,000 units capsule Take 12,000 units of lipase by mouth 3 (three) times a day with meals 270 capsule 3    polyethylene glycol (MIRALAX) 17 g packet Take 17 g by mouth daily as needed (Constipation) 20 each 0    prochlorperazine (COMPAZINE) 10 mg tablet Take 1 tablet (10 mg total) by mouth every 6 (six) hours as needed for nausea or vomiting 30 tablet 0    senna (SENOKOT) 8 6 MG tablet Take 1 tablet (8 6 mg total) by mouth daily 30 tablet 3     No current facility-administered medications for this visit  (Not in a hospital admission)      DATA REVIEW:    Pathology Result:    Final Diagnosis   Date Value Ref Range Status   10/13/2021   Final    A B C  Pancreas, pancreatic body mass (ThinPrep, smears and cell block preparations ):       -  Malignant  (PSC Category VI) -See note  -  Adenocarcinoma  Satisfactory for evaluation  Note:   - The case was sent to Dr Kalpesh Oh For an expert consultation upon Dr Rolando Miranda's request    - The amended diagnosis is discussed with Dr Afua Bernabe via [address] [place] [address] Rd Text  at 1:22 pm on 10/29/21, who finds no clinical impact on patient care  Image Results: They are reviewed and documented in Hematology/Oncology history    CT chest abdomen pelvis wo contrast  Narrative: CT CHEST, ABDOMEN AND PELVIS WITHOUT IV CONTRAST    INDICATION:   C25 9: Malignant neoplasm of pancreas, unspecified  R74 8: Abnormal levels of other serum enzymes  History of appendectomy, cholecystectomy, exploratory laparotomy, IR port placement  Patient with locally advanced unresectable pancreatic adenocarcinoma, chemotherapy  COMPARISON:  CTA chest 01/19/2022  CT abdomen pelvis with contrast 01/18/2022  TECHNIQUE: CT examination of the chest, abdomen and pelvis was performed without intravenous contrast   Axial, sagittal, and coronal 2D reformatted images were created from the source data and submitted for interpretation  Radiation dose length product (DLP) for this visit:  632 mGy-cm     This examination, like all CT scans performed in the [facility], was performed utilizing techniques to minimize radiation dose exposure, including the use of iterative reconstruction and automated exposure control  Enteric contrast was not administered  Evaluation is limited without the use of intravenous contrast     FINDINGS:    CHEST    LUNGS:  Centrilobular emphysema  3 mm nodule right middle lobe, 3/97,   Scarring in the lower lobes  Left lower lobe calcified granuloma  There is no tracheal or endobronchial lesion  PLEURA:  Biapical pleural-parenchymal thickening    HEART/GREAT VESSELS: Thoracic aortic, annular, and coronary artery calcification  No thoracic aortic aneurysm  Blood pool suggests anemia  Right-sided vascular port tip at the cavoatrial junction  MEDIASTINUM AND CED:  Unremarkable  CHEST WALL AND LOWER NECK:   Unremarkable  ABDOMEN    LIVER/BILIARY TREE:  Similar intra and extra hepatic biliary ductal dilation  GALLBLADDER:  No calcified gallstones  No pericholecystic inflammatory change  SPLEEN:  Unremarkable  PANCREAS:  Known pancreatic mass not well appreciated on this noncontrasted exam when compared to prior examination at a similar level, on this study series 2 image 71 on prior study series 2 image 24 has no significant appreciable interval change in   size with maximum AP dimension of approximately 3 cm and likely measuring 4 cm transversely  ADRENAL GLANDS:  Unremarkable  KIDNEYS/URETERS:  Unremarkable  No hydronephrosis  STOMACH AND BOWEL:  Large stool burden    APPENDIX:  Reported appendectomy  ABDOMINOPELVIC CAVITY:  Small amount of abdominal pelvic ascites redemonstrated, similar from prior     No pneumoperitoneum  No lymphadenopathy  VESSELS:  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  Multiple collaterals in the abdomen  Portal vein is dilated  Infrarenal abdominal aortic ectasia and 29 mm, not significantly changed from prior  PELVIS    REPRODUCTIVE ORGANS:  Unremarkable for patient's age  URINARY BLADDER:  Unremarkable      ABDOMINAL WALL/INGUINAL REGIONS:  Unremarkable  OSSEOUS STRUCTURES degenerative changes :  No acute fracture or destructive osseous lesion  Impression: New 3 mm nodule in the right middle lobe, attention on follow-up  Otherwise, no significant change compared to prior CT examination of 01/18/2022 within the limitations of this noncontrasted exam     Additional findings as above  The study was marked in EPIC for significant notification  Workstation performed: [id number]        LABS:  Lab data are reviewed and documented in HemOnc history  No results found for this or any previous visit (from the past 48 hour(s))            Pepe Teresa MD  4/15/2022, 12:50 PM

## 2022-04-19 NOTE — TELEPHONE ENCOUNTER
Primary palliative medicine provider:  Dr Trevor Martinez     Medication requested: oxycodone 20 mg tablets  If for pain, how has the patient been taking their pain medicine?       Last appointment: 3/22     Next scheduled appointment: 4/21     PDMP review:  [id number] 03/16/2022 03/16/2022 oxyCODONE HCL (Tablet) 90 0 15 20  0 [address] [place] 00 [place] 00 [place]     1 [id number] 02/18/2022 02/16/2022 oxyCODONE HCL (Tablet) 90 0 15 20  0 [address] [place] 00 [place]    1 [id number] 01/14/2022 01/11/2022 oxyCODONE HCL (Tablet) 90 0 15 20  0 [address] [place] 00 / 00 [place]    1 [id number] 12/08/2021 12/07/2021 oxyCODONE HCL (Tablet) 90 0 15 20  0 [address] [place] 00 / 00 [place]    1 [id number] 11/07/2021 07/19/2021 traMADol HCL (Tablet) 100 0 12 50 MG 41 [address] Commercial [place] 04 / 05 [place]

## 2022-04-19 NOTE — TELEPHONE ENCOUNTER
Intra-Department Referral    Patient Details:  Yadiel Santiago  1940     Background Information:  [address] starts by opening a telephone encounter and gathering the following information   What department is patient being referred to? Surg Onc   Who is calling to schedule and relationship?    Patient   Diagnosis Pancreatic adenocarcinoma   What department was patient seen in last?       Medical Onc         Miscellaneous:Scheduled appointment 5/12/22 1:45 pm

## 2022-04-21 NOTE — PROGRESS NOTES
Outpatient Follow-Up - Palliative and Supportive Care   Mike Maria 80 y o  female [id number]    Assessment & Plan  Problem List Items Addressed This Visit        Digestive    Chronic pancreatitis (HCC)    Drug-induced constipation    Pancreatic adenocarcinoma (HCC) - Primary    Relevant Medications    gabapentin (Neurontin) 100 mg capsule    prochlorperazine (COMPAZINE) 10 mg tablet    ondansetron (ZOFRAN) 4 mg tablet       Other    Mild protein-calorie malnutrition (HCC)    Cancer related pain    Relevant Medications    gabapentin (Neurontin) 100 mg capsule    Palliative care patient    Relevant Medications    gabapentin (Neurontin) 100 mg capsule      Other Visit Diagnoses     Nausea        Relevant Medications    prochlorperazine (COMPAZINE) 10 mg tablet    ondansetron (ZOFRAN) 4 mg tablet        #symptom management  #cancer-related pain  #chronic pain   - continue APAP 500 mg PO Q6H PRN              - max daily 3000 mg   - continue oxy-IR 10-20 mg PO Q4H PRN              - use of 4-6 tabs/day   - continue gabapentin 100 mg PO QHS     #OIC   - continue miralax 17 g PO QDaily PRN   - continue senna 1 tab PO QDaily PRN     #nausea   - continue ondansetron 4 mg PO Q8H PRN [first line]   - continue prochlorperazine 10 mg PO Q6H PRN [second line]     #depression   - continue escitalopram 20 mg PO QDaily     #decreased appetite   - recent 3 lb weight gain after a 10 lb weight loss in the past month [overall 7 lb down in 1 month]   - attributes to altered taste ["tastes like cardboard"], reports feeling hungry   - season food to taste, identify foods with appropriate taste   - continue Ensure 1-2 bottles/day   - continue MMJ product     #MMJ   - certified with MMJ card    #goals of care   - upcoming PET/CT imaging on 05/16/2022   - plan to reschedule surgical oncology appointment for after imaging   - treatment focused care with no limitations at this time   - discussed worsening symptoms after this current cycle with identification that burden of intervention potentially outweighing perceived benefit  Patient stated consideration to no longer focus on treatment  However, notes that will wait until upcoming imaging to determine actual benefit of therapy  - briefly discussed role of hospice benefit in end-of-life symptoms management with all questions/concerns addressed   - will continue to discuss as clinical picture evolves    #psychosocial support   - emotional support provided   - ,  []   - one adult son [Bill]      Next [address] [facility] Follow up in 4 weeks  Controlled Substance Review    [place]MP or [place]  reviewed: No red flags were identified; safe to proceed with prescription  [place]cho[place] PDMP Review       Value Time User    PDMP Reviewed  Yes (P)  2022  3:16 PM Karlie Torre MD          Medications adjusted this encounter:  Requested Prescriptions     Signed Prescriptions Disp Refills    gabapentin (Neurontin) 100 mg capsule 30 capsule 0     Sig: Take 1 capsule (100 mg total) by mouth daily at bedtime    prochlorperazine (COMPAZINE) 10 mg tablet 50 tablet 2     Sig: Take 1 tablet (10 mg total) by mouth every 6 (six) hours as needed for nausea or vomiting    ondansetron (ZOFRAN) 4 mg tablet 50 tablet 2     Sig: Take 1 tablet (4 mg total) by mouth every 8 (eight) hours as needed for nausea or vomiting     No orders of the defined types were placed in this encounter  Medications Discontinued During This Encounter   Medication Reason    gabapentin (Neurontin) 100 mg capsule Reorder    ondansetron (ZOFRAN) 4 mg tablet Reorder    prochlorperazine (COMPAZINE) 10 mg tablet Reorder         Loli Pagan was seen today for symptoms and planning cares related to above illnesses  I have reviewed the patient's controlled substance dispensing history in the Prescription Drug Monitoring Program in compliance with the [facility] regulations before prescribing any controlled substances      They are invited to continue to follow with us  If there are questions or concerns, please contact us through our clinic/answering service 24 hours a day, seven days a week  Marilyn Chery MD  [facility] Palliative and Supportive [facility]        Visit Information    Accompanied By: Ruperto Harp    Source of History: Self, Family member, Medical record    History Limitations: None      History of Present Illness    Umair Solorzano is a 80 y o  female who presents in follow up of symptoms related to locally advanced pancreatic adenocarcinoma  Pertinent issues include: symptom management, pain, neoplasm related, assessment of goals of care, advance care planning  Patient reports most recent infusion cycle was "bad", expanded definition of bad highlights worsening nausea without vomiting and abdominal pain  Constant, worsening over the course of two days following chemotherapy with gradual improvement since teressa  Currently reports symptoms at a manageable level today  Use of oxy-IR x 4-6 tabs/day for management  BM every 1-2 days, use of bowel regimen  Slight nausea without vomiting, ondansetron + prochlorperazine effective  Appetite on-going struggle, reports feeling hungry, however, poor taste  Recent 3 lb gain, however, notes 10 lb loss preceded recent weight gain over the past 1 month [overall 7 lb down]  Adequate sleep  Symptoms were bad enough that the patient felt she did not wish to continue chemotherapy if this is how she will feel from this point forward  Also shared concern of symptoms if decision to discontinue antineoplastic therapy [shared with patient unable to predict symptoms of the future however, introduced active symptoms management under hospice model of care which may address anticipatory anxiety]  Past medical, surgical, social, and family histories are reviewed and pertinent updates are made      Review of Systems   Constitutional: Positive for decreased appetite, malaise/fatigue and weight loss  Negative for chills and fever  HENT: Negative for congestion  Eyes: Negative for visual disturbance  Cardiovascular: Negative for chest pain  Respiratory: Negative for shortness of breath  Musculoskeletal: Negative for falls and neck pain  Gastrointestinal: Positive for abdominal pain, constipation and nausea  Negative for vomiting  Genitourinary: Negative for frequency  Neurological: Negative for headaches  Psychiatric/Behavioral: The patient does not have insomnia  All other systems reviewed and are negative  Vital Signs    /60 (BP Location: Left arm, Cuff Size: Standard)   Pulse 83   Temp (!) 97 2 °F (36 2 °C) (Temporal)   Resp (!) 24   Wt 48 7 kg (107 lb 6 4 oz)   SpO2 99%   BMI 17 87 kg/m²     Physical Exam and Objective Data  Physical Exam  Vitals and nursing note reviewed  Constitutional:       General: She is awake  Appearance: She is not diaphoretic  Comments: Chronically ill appearing in NAD  BMI 17 9  Non-toxic appearing   HENT:      Head: Normocephalic and atraumatic  Right Ear: External ear normal       Left Ear: External ear normal       Nose: No rhinorrhea  Eyes:      Comments: No gaze preference   Cardiovascular:      Rate and Rhythm: Normal rate  Pulmonary:      Effort: No tachypnea, accessory muscle usage or respiratory distress  Comments: Completes full sentences without difficulty  Musculoskeletal:      Cervical back: Normal range of motion  Neurological:      General: No focal deficit present  Mental Status: She is alert and oriented to person, place, and time     Psychiatric:         Attention and Perception: Attention normal          Mood and Affect: Mood and affect normal          Speech: Speech normal          Cognition and Memory: Cognition and memory normal            Radiology and Laboratory:  I personally reviewed and interpreted the following results:    Most Recent COVID-19 Results:  Lab Results Component Value Date/Time    SARSCOV2 Negative 01/19/2022 06:20 PM       Most Recent Lab Work:  Lab Results   Component Value Date/Time    SODIUM 140 04/07/2022 10:16 AM    K 4 1 04/07/2022 10:16 AM    BUN 9 04/07/2022 10:16 AM    CREATININE 0 75 04/07/2022 10:16 AM    GLUC 180 (H) 04/07/2022 10:16 AM     Lab Results   Component Value Date/Time    AST 29 04/07/2022 10:16 AM    ALT 73 04/07/2022 10:16 AM    ALB 3 5 04/07/2022 10:16 AM     Lab Results   Component Value Date/Time    HGB 10 2 (L) 04/07/2022 10:16 AM    WBC 6 91 04/07/2022 10:16 AM     (L) 04/07/2022 10:16 AM    INR 1 09 01/19/2022 06:20 PM    PTT 29 01/19/2022 06:20 PM       Most Recent Imaging [last 30 days]:  Procedure: CT chest abdomen pelvis wo contrast    Result Date: 4/7/2022  Narrative: CT CHEST, ABDOMEN AND PELVIS WITHOUT IV CONTRAST INDICATION:   C25 9: Malignant neoplasm of pancreas, unspecified R74 8: Abnormal levels of other serum enzymes  History of appendectomy, cholecystectomy, exploratory laparotomy, IR port placement  Patient with locally advanced unresectable pancreatic adenocarcinoma, chemotherapy  COMPARISON:  CTA chest 01/19/2022  CT abdomen pelvis with contrast 01/18/2022  TECHNIQUE: CT examination of the chest, abdomen and pelvis was performed without intravenous contrast   Axial, sagittal, and coronal 2D reformatted images were created from the source data and submitted for interpretation  Radiation dose length product (DLP) for this visit:  632 mGy-cm   This examination, like all CT scans performed in the [facility], was performed utilizing techniques to minimize radiation dose exposure, including the use of iterative reconstruction and automated exposure control  Enteric contrast was not administered  Evaluation is limited without the use of intravenous contrast  FINDINGS: CHEST LUNGS:  Centrilobular emphysema  3 mm nodule right middle lobe, 3/97,   Scarring in the lower lobes    Left lower lobe calcified granuloma  There is no tracheal or endobronchial lesion  PLEURA:  Biapical pleural-parenchymal thickening HEART/GREAT VESSELS: Thoracic aortic, annular, and coronary artery calcification  No thoracic aortic aneurysm  Blood pool suggests anemia  Right-sided vascular port tip at the cavoatrial junction  MEDIASTINUM AND CED:  Unremarkable  CHEST WALL AND LOWER NECK:   Unremarkable  ABDOMEN LIVER/BILIARY TREE:  Similar intra and extra hepatic biliary ductal dilation  GALLBLADDER:  No calcified gallstones  No pericholecystic inflammatory change  SPLEEN:  Unremarkable  PANCREAS:  Known pancreatic mass not well appreciated on this noncontrasted exam when compared to prior examination at a similar level, on this study series 2 image 71 on prior study series 2 image 24 has no significant appreciable interval change in size with maximum AP dimension of approximately 3 cm and likely measuring 4 cm transversely  ADRENAL GLANDS:  Unremarkable  KIDNEYS/URETERS:  Unremarkable  No hydronephrosis  STOMACH AND BOWEL:  Large stool burden APPENDIX:  Reported appendectomy  ABDOMINOPELVIC CAVITY:  Small amount of abdominal pelvic ascites redemonstrated, similar from prior     No pneumoperitoneum  No lymphadenopathy  VESSELS:  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  Multiple collaterals in the abdomen  Portal vein is dilated  Infrarenal abdominal aortic ectasia and 29 mm, not significantly changed from prior  PELVIS REPRODUCTIVE ORGANS:  Unremarkable for patient's age  URINARY BLADDER:  Unremarkable  ABDOMINAL WALL/INGUINAL REGIONS:  Unremarkable  OSSEOUS STRUCTURES degenerative changes :  No acute fracture or destructive osseous lesion  Impression: New 3 mm nodule in the right middle lobe, attention on follow-up  Otherwise, no significant change compared to prior CT examination of 01/18/2022 within the limitations of this noncontrasted exam  Additional findings as above   The study was marked in EPIC for significant notification  Workstation performed: [id number]       35 minutes was spent face to face with Laura Tapia and her son with greater than 50% of the time spent in counseling or coordination of care including discussions of provided medical updates, discussed palliative care, determined goals of care, determined social/family support, discussed plans of care, discussed symptom management, provided psychosocial support  Medication refill  Answered all pharmacy-related questions  Introduced hospice model of care  PDMP Reviewed  All of the patient's or agent's questions were answered during this discussion

## 2022-04-22 NOTE — PROGRESS NOTES
Review of patients chart  Referral made by Medical Oncology to Surgical Oncology  Patient has been on treatment since 10/2021

## 2022-04-25 NOTE — TELEPHONE ENCOUNTER
Reschedule Appointment     Who is calling in [facility]   Doctor Appointment Scheduled with Sriram Maurer date and time 5-12-22 @ 1:45pm   New date and time 5-20-22 @ 1:00pm    Location [place]   Patient verbalized understanding   Patient wanted to wait until after Pet CT results to see Tye Conrad

## 2022-04-25 NOTE — PROGRESS NOTES
Medicare Bundle episode ended  Bundle episode resolved and care coordination note removed    CM removed self from care team

## 2022-05-10 NOTE — TELEPHONE ENCOUNTER
ContactedBettvíctor today touch base in regards to her nutrition  Krissy Portillo reports that she is doing okay at this time  She explains that she is feeling somewhat better since she has had some time off of from her chemo  She explains that she has a CT scan next week  She has no nutrition related questions/concerns today but is appreciative of call  Pt has RD contact info encouraged her to reach out with any questions/concerns

## 2022-05-16 NOTE — TELEPHONE ENCOUNTER
Primary palliative medicine provider:   Dr Minda Giles    Medication requested:  Oxycodone 20 mg IR     If for pain, how has the patient been taking their pain medicine?  0 5 to 1 tab every 4 hours as needed  Last appointment: 4/21/22    Next scheduled appointment: 5/30/22    PDMP review:  Did not review     Pt reports she is out of medication  [facility] requested refill     New address for pharmacy

## 2022-05-16 NOTE — TELEPHONE ENCOUNTER
 Hello, can I please speak to (patient name) this is (enter your name here) calling from [facility]  Luke'eladio harden) to remind you of your appointment on (5/20 1pm) [facility]  I am calling to review our no-show/cancelation policy and masking policy  Do you have a few minutes? We ask that you come at least 15 minutes early for your appointment to complete all paperwork  However, If you are up to 20 minutes late for your appointment, we may need to reschedule you  Any lateness to an appointment may result in an shorted visit, for our providers to offer you the most out of your consult, please arrive early  We require at least 24-hour notice for cancelations and if you miss your appointment 3 times, we may unfortunately not be able to reschedule any future visits  We ask that you please call our office in the event you are feeling ill as we may need to reschedule your appointment  You are allowed to bring only one visitor with you to your appointment, if your visitor is not feeling well we ask that you don't bring them  Our current masking policy is: if you are vaccinated masking is optional, if you are unvaccinated masking is required  We look forward to seeing you at your upcoming appointment!     Called and spoke with Tr Matamorosde and her son Dominic Vitale  Confirmed appt and went over policies with Dominic Vitale

## 2022-05-20 NOTE — PROGRESS NOTES
Surgical Oncology Consult       [address] [facility]ER [facility] [address] [address]  [phone]    Loli Duel  1940  [id number]  [address] [facility] [address]  [place] [address]  [phone]    Diagnoses and all orders for this visit:    Pancreatic adenocarcinoma [facility])  -     Ambulatory referral to Surgical Oncology        Chief Complaint   Patient presents with    Consult       No follow-ups on file      Oncology History   Pancreatic adenocarcinoma ([facility] [address] )   10/25/2021 Initial Diagnosis    Pancreatic adenocarcinoma ([facility] [address] )     11/8/2021 -  Chemotherapy    pegfilgrastim (NEULASTA ONPRO), 6 mg, Subcutaneous, Once, 4 of 4 cycles  Administration: 6 mg (11/8/2021), 6 mg (12/6/2021), 6 mg (1/31/2022), 6 mg (2/14/2022), 6 mg (3/28/2022)  paclitaxel protein-bound (ABRAXANE) IVPB, 100 mg/m2 = 157 mg (80 % of original dose 125 mg/m2), Intravenous, Once, 6 of 6 cycles  Dose modification: 100 mg/m2 (original dose 125 mg/m2, Cycle 1, Reason: Performance Status), 80 mg/m2 (original dose 125 mg/m2, Cycle 3, Reason: Performance Status)  Administration: 157 mg (11/8/2021), 157 mg (12/6/2021), 157 mg (12/20/2021), 126 mg (1/3/2022), 126 mg (1/17/2022), 126 mg (1/31/2022), 126 mg (2/14/2022), 126 mg (2/28/2022), 126 mg (3/14/2022), 126 mg (3/28/2022), 126 mg (4/11/2022)  gemcitabine (GEMZAR) infusion, 1,000 mg/m2 = 1,570 mg, Intravenous, Once, 6 of 6 cycles  Dose modification: 800 mg/m2 (original dose 1,000 mg/m2, Cycle 3, Reason: Performance Status)  Administration: 1,570 mg (11/8/2021), 1,570 mg (12/6/2021), 1,570 mg (12/20/2021), 1,255 9 mg (1/3/2022), 1,255 9 mg (1/17/2022), 1,255 9 mg (1/31/2022), 1,255 9 mg (2/14/2022), 1,255 9 mg (2/28/2022), 1,255 9 mg (3/14/2022), 1,255 9 mg (3/28/2022), 1,255 9 mg (4/11/2022)         History of Present Illness:  80year-old female with a history of pancreatitis  She ultimately had workup of previous pancreas issues  MRI from September 17, 2021 revealed a 5 x 3 5 cm mass in the pancreas body  There appears to be involvement of the celiac artery and SMA  EUS from October 13, 2021 revealed a 3 5 x 3 2 cm mass in the body of the pancreas  This was abutting the portal and splenic vein  Her initial CA 19-9 was 114 on December 3, 2021  This has decreased to 37 on April 7, 2022  She then underwent chemotherapy with gem/Abraxane as this was felt to be locally advanced  PET-CT from May 16, 2022 revealed no significant FDG activity in the pancreas mass  This still appears to be encasing celiac and SMA  I personally reviewed the films with Radiology  Her appetite is fair  She has lost 45 lb  She states she is feeling better this week since she is off chemotherapy  She does have nausea but no vomiting  She is focused on her quality of life  Review of Systems  Complete ROS Surg Onc:   Constitutional: The patient denies new or recent history of general fatigue, no recent weight loss, no change in appetite  Eyes: No complaints of visual problems, no scleral icterus  ENT: no complaints of ear pain, no hoarseness, no difficulty swallowing,  no tinnitus and no new masses in head, oral cavity, or neck  Cardiovascular: No complaints of chest pain, no palpitations, no ankle edema  Respiratory: No complaints of shortness of breath, no cough  Gastrointestinal: No complaints of jaundice, no bloody stools, no pale stools  Genitourinary: No complaints of dysuria, no hematuria, no nocturia, no frequent urination, no urethral discharge  Musculoskeletal: No complaints of weakness, paralysis, joint stiffness or arthralgias  Integumentary: No complaints of rash, no new lesions  Neurological: No complaints of convulsions, no seizures, no dizziness  Hematologic/Lymphatic: No complaints of easy bruising     Endocrine:  No hot or cold intolerance  No polydipsia, polyphagia, or polyuria  Allergy/immunology:  No environmental allergies  No food allergies  Not immunocompromised  Skin:  No pallor or rash  No wound  Patient Active Problem List   Diagnosis    DDD (degenerative disc disease), lumbar    Chronic pancreatitis ([place] [address] )    Mixed hyperlipidemia    Allergic rhinitis    Impaired fasting glucose    Essential hypertension    Tobacco use    Osteoporosis    Reactive depression    Weight loss    Anemia    Drug-induced constipation    Anxiety    Spinal stenosis    Pancreatic adenocarcinoma ([place] [address] )    Encounter for chemotherapy management    Mild protein-calorie malnutrition ([place] [address] )    At high risk for infection due to chemotherapy    Port-A-Cath in place    Medical marijuana use    Cancer related pain    Palliative care patient    Atrial fibrillation with RVR ([place] [address] )    Edema     Past Medical History:   Diagnosis Date    Anxiety     Chronic pancreatitis ([place] [address] )     Depression     Deviated nasal septum     H/O colonoscopy     6/7/10    Hyperlipidemia     Hypertension     Impaired fasting glucose     Spinal stenosis      Past Surgical History:   Procedure Laterality Date    APPENDECTOMY      CHOLECYSTECTOMY      EXPLORATORY LAPAROTOMY      IR PORT PLACEMENT  11/2/2021    TUBAL LIGATION       Family History   Problem Relation Age of Onset    Multiple myeloma Mother     Coronary artery disease Father     Stroke Father         CVA     Social History     Socioeconomic History    Marital status:       Spouse name: Not on file    Number of children: 1    Years of education: Not on file    Highest education level: Not on file   Occupational History    Not on file   Tobacco Use    Smoking status: Current Every Day Smoker     Packs/day: 0 50     Types: Cigarettes    Smokeless tobacco: Never Used   Vaping Use    Vaping Use: Never used   Substance and Sexual Activity    Alcohol use: Never     Comment: wine with tea  a couple of times a week     Drug use: No    Sexual activity: Not Currently   Other Topics Concern    Not on file   Social History Narrative    Not on file     Social Determinants of Health     Financial Resource Strain: Not on file   Food Insecurity: No Food Insecurity    Worried About Running Out of Food in the Last Year: Never true    Rayna of Food in the Last Year: Never true   Transportation Needs: No Transportation Needs    Lack of Transportation (Medical): No    Lack of Transportation (Non-Medical): No   Physical Activity: Not on file   Stress: Not on file   Social Connections: Unknown    Frequency of Communication with Friends and Family: More than three times a week    Frequency of Social Gatherings with Friends and Family: More than three times a week    Attends Mu-ism Services: Not on file   [employer] of [employer]s or Organizations: Not on file    Attends Club or Organization Meetings: Not on file    Marital Status:     Intimate Partner Violence: Not on file   Housing Stability: Low Risk     Unable to Pay for Housing in the Last Year: No    Number of Places Lived in the Last Year: 1    Unstable Housing in the Last Year: No       Current Outpatient Medications:     acetaminophen (TYLENOL) 500 mg tablet, Take 1 tablet (500 mg total) by mouth every 6 (six) hours as needed for mild pain, Disp: 50 tablet, Rfl: 0    aspirin 325 mg tablet, Take 1 tablet (325 mg total) by mouth daily, Disp: , Rfl:     atorvastatin (LIPITOR) 20 mg tablet, Take 0 5 tablets (10 mg total) by mouth daily, Disp: 90 tablet, Rfl: 0    escitalopram (LEXAPRO) 20 mg tablet, Take 1 tablet (20 mg total) by mouth daily, Disp: 90 tablet, Rfl: 0    fluticasone (FLONASE) 50 mcg/act nasal spray, 2 sprays into each nostril daily, Disp: , Rfl:     furosemide (LASIX) 20 mg tablet, 1 bid prn edema, Disp: 60 tablet, Rfl: 5    gabapentin (Neurontin) 100 mg capsule, Take 1 capsule (100 mg total) by mouth daily at bedtime, Disp: 30 capsule, Rfl: 0     MG tablet, TAKE 1 TABLET (600 MG TOTAL) BY MOUTH 3 (THREE) TIMES A DAY, Disp: 270 tablet, Rfl: 3    ondansetron (ZOFRAN) 4 mg tablet, Take 1 tablet (4 mg total) by mouth every 8 (eight) hours as needed for nausea or vomiting, Disp: 50 tablet, Rfl: 2    oxyCODONE (ROXICODONE) 20 MG TABS, Take 0 5-1 tablets (10-20 mg total) by mouth every 4 (four) hours as needed for moderate pain Max Daily Amount: 120 mg, Disp: 90 tablet, Rfl: 0    pancrelipase, Lip-Prot-Amyl, (CREON) 12,000 units capsule, Take 12,000 units of lipase by mouth 3 (three) times a day with meals, Disp: 270 capsule, Rfl: 3    polyethylene glycol (MIRALAX) 17 g packet, Take 17 g by mouth daily as needed (Constipation), Disp: 20 each, Rfl: 0    prochlorperazine (COMPAZINE) 10 mg tablet, Take 1 tablet (10 mg total) by mouth every 6 (six) hours as needed for nausea or vomiting, Disp: 50 tablet, Rfl: 2    senna (SENOKOT) 8 6 MG tablet, Take 1 tablet (8 6 mg total) by mouth daily, Disp: 30 tablet, Rfl: 3    diltiazem (CARDIZEM CD) 120 mg 24 hr capsule, Take 1 capsule (120 mg total) by mouth daily (Patient not taking: Reported on 5/20/2022), Disp: 30 capsule, Rfl: 1    metoprolol tartrate (LOPRESSOR) 25 mg tablet, Take 1 tablet (25 mg total) by mouth every 12 (twelve) hours (Patient not taking: Reported on 5/20/2022), Disp: 60 tablet, Rfl: 1  Allergies   Allergen Reactions    Morphine Abdominal Pain    Iv Contrast [Iodinated Diagnostic Agents] Palpitations     Vitals:    05/20/22 1258   BP: 120/60   Pulse: 70   Resp: 16   Temp: 98 3 °F (36 8 °C)   SpO2: 94%       Physical Exam   Constitutional: General appearance: The Patient is well-developed and well-nourished who appears the stated age in no acute distress  Patient is pleasant and talkative  HEENT:  Normocephalic  Sclerae are anicteric  Mucous membranes are moist  Neck is supple without adenopathy  No JVD       Chest: The lungs are clear to auscultation  Cardiac: Heart is regular rate  Abdomen: Abdomen is soft, non-tender, non-distended and without masses  Extremities: There is no clubbing or cyanosis  There is no edema  Symmetric  Neuro: Grossly nonfocal  Gait is normal      Lymphatic: No evidence of cervical adenopathy bilaterally  No evidence of axillary adenopathy bilaterally  No evidence of inguinal adenopathy bilaterally  Skin: Warm, anicteric  Psych:  Patient is pleasant and talkative  Breasts:      Pathology:  [unfilled]    Labs:      Imaging  NM PET CT skull base to mid thigh    Result Date: 5/16/2022  Narrative: PET/CT SCAN INDICATION:  C25 9: Malignant neoplasm of pancreas, unspecified C25 1: Malignant neoplasm of body of pancreas   , restaging postchemotherapy MODIFIER: PS COMPARISON: CT 4/1/2022 and priors CELL TYPE:  Adenocarcinoma, pancreatic body mass biopsy 10/31/2021 TECHNIQUE:   8 6 mCi F-18-FDG administered IV  Multiplanar attenuation corrected and non attenuation corrected PET images were acquired 60 minutes post injection  Contiguous, low dose, axial CT sections were obtained from the skull base through the femurs   Intravenous contrast material was not utilized  This examination, like all CT scans performed in the [facility], was performed utilizing techniques to minimize radiation dose exposure, including the use of iterative reconstruction and automated exposure control  Fasting serum glucose: 106 mg/dl FINDINGS: VISUALIZED BRAIN:   No acute abnormalities are seen  HEAD/NECK:   There is a physiologic distribution of FDG  No FDG avid cervical adenopathy is seen  CT images: Unremarkable  CHEST: 7 mm precarinal node image 3/95 demonstrates only mild FDG activity, SUV 1 9, favoring a reactive etiology  For comparison, mediastinal blood pool SUV measures 1 7  Prior CT measurement from 9/28/2021 was 7 mm  Otherwise no FDG avid soft tissue lesions are seen   CT images: Coronary atherosclerosis  Emphysema  Right chest wall port  ABDOMEN: Pancreatic body mass does not demonstrate significant FDG activity, compared to surrounding soft tissue activity, SUV measuring 1 9  For comparison, liver activity has an SUV of 2  However, it is uncertain if this is because of treated tumor versus low  FDG avidity of the primary tumor  No discrete FDG avid soft tissue lesions are seen  CT images: Ectatic abdominal aorta measuring 3 cm  Small amount of abdominal pelvic fluid  PELVIS: There is prominent FDG activity involving the sigmoid colon, SUV 7 3  This may be physiologic, though occult lesion is not excluded at this time  No additional FDG avid soft tissue lesions are seen  CT images: Stable  OSSEOUS STRUCTURES: No FDG avid lesions are seen  CT images: Stable  Impression: 1  Pancreatic body mass does not demonstrate significant FDG activity, compared to surrounding soft tissue activity, though uncertain if this is because of treated tumor versus low FDG avidity of the primary tumor  2   Prominent FDG activity involving the sigmoid colon may be physiologic, though occult lesion is not excluded at this time  Clinical correlation recommended  3   Otherwise there are no FDG avid lesions are concerning for malignancy/metastases  Workstation performed: [id number]     I reviewed the above laboratory and imaging data  Discussion/Summary:  59-year-old female with locally advanced pancreas cancer  I had a long discussion with the patient  Currently based on her PET-CT, she still appears to have major vessel involvement  I would not recommend surgical resection  I discussed that potential irreversible electroporation may be possible, but she should have an MRI prior to any intervention  I did discuss that I doubt the MRI would change what I am seeing on the PET-CT  We also discussed that the lesion still appears to be over 2 cm in size    She had several questions regarding surgery and even continuing chemotherapy  I did discuss with her that it appears that hospice has been brought up with the patient in the past   My recommendation to her was to either continue chemotherapy with full support from palliative care to transition to hospice since she is very focused on her quality of life, and she feels better off chemotherapy  I did discuss that irreversible electroporation could be performed, but this would not likely cure her despite some of the data that shows a 3 year survival benefit  Given 2 vessel involvement and the size of this lesion, I think the risks outweigh the benefits in this situation  She is going to discuss this further with her family as well as Dr Severa Higashi from palliative Care  I will see her in the future should the need arise  She is agreeable to this  All her questions were answered

## 2022-05-20 NOTE — LETTER
May 20, 2022     Katina Lemon MD  [facility] [address]    Patient: Priyank Reyes   YOB: 1940   Date of Visit: 5/20/2022       Dear Dr Margaret Marquez: Thank you for referring Rudy Rico to me for evaluation  Below are my notes for this consultation  If you have questions, please do not hesitate to call me  I look forward to following your patient along with you  Sincerely,        Jobe Bloch, MD        CC: Cathryn Rhine, MD Marlow Alpers, MD Jobe Bloch, MD  5/20/2022  1:38 PM  Incomplete               Surgical Oncology Consult       [address] [place] [address]  [facility] [address]  [address] [place] [address]  1940  [id number]  [facility] [address] [address] [phone]    Diagnoses and all orders for this visit:    Pancreatic adenocarcinoma [facility])  -     Ambulatory referral to Surgical Oncology        Chief Complaint   Patient presents with    Consult       No follow-ups on file      Oncology History   Pancreatic adenocarcinoma ([facility] )   10/25/2021 Initial Diagnosis    Pancreatic adenocarcinoma ([facility] )     11/8/2021 -  Chemotherapy    pegfilgrastim ([facility] [place]), 6 mg, Subcutaneous, Once, 4 of 4 cycles  Administration: 6 mg (11/8/2021), 6 mg (12/6/2021), 6 mg (1/31/2022), 6 mg (2/14/2022), 6 mg (3/28/2022)  paclitaxel protein-bound (ABRAXANE) IVPB, 100 mg/m2 = 157 mg (80 % of original dose 125 mg/m2), Intravenous, Once, 6 of 6 cycles  Dose modification: 100 mg/m2 (original dose 125 mg/m2, Cycle 1, Reason: Performance Status), 80 mg/m2 (original dose 125 mg/m2, Cycle 3, Reason: Performance Status)  Administration: 157 mg (11/8/2021), 157 mg (12/6/2021), 157 mg (12/20/2021), 126 mg (1/3/2022), 126 mg (1/17/2022), 126 mg (1/31/2022), 126 mg (2/14/2022), 126 mg (2/28/2022), 126 mg (3/14/2022), 126 mg (3/28/2022), 126 mg (4/11/2022)  gemcitabine (GEMZAR) infusion, 1,000 mg/m2 = 1,570 mg, Intravenous, Once, 6 of 6 cycles  Dose modification: 800 mg/m2 (original dose 1,000 mg/m2, Cycle 3, Reason: Performance Status)  Administration: 1,570 mg (11/8/2021), 1,570 mg (12/6/2021), 1,570 mg (12/20/2021), 1,255 9 mg (1/3/2022), 1,255 9 mg (1/17/2022), 1,255 9 mg (1/31/2022), 1,255 9 mg (2/14/2022), 1,255 9 mg (2/28/2022), 1,255 9 mg (3/14/2022), 1,255 9 mg (3/28/2022), 1,255 9 mg (4/11/2022)         History of Present Illness:  81-year-old female with a history of pancreatitis  She ultimately had workup of previous pancreas issues  MRI from September 17, 2021 revealed a 5 x 3 5 cm mass in the pancreas body  There appears to be involvement of the celiac artery and SMA  EUS from October 13, 2021 revealed a 3 5 x 3 2 cm mass in the body of the pancreas  This was abutting the portal and splenic vein  Her initial CA 19-9 was 114 on December 3, 2021  This has decreased to 37 on April 7, 2022  She then underwent chemotherapy with gem/Abraxane as this was felt to be locally advanced  PET-CT from May 16, 2022 revealed no significant FDG activity in the pancreas mass  This still appears to be encasing celiac and SMA  I personally reviewed the films with Radiology  Her appetite is fair  She has lost 45 lb  She states she is feeling better this week since she is off chemotherapy  She does have nausea but no vomiting  She is focused on her quality of life  Review of Systems  Complete ROS Surg Onc:   Constitutional: The patient denies new or recent history of general fatigue, no recent weight loss, no change in appetite  Eyes: No complaints of visual problems, no scleral icterus  ENT: no complaints of ear pain, no hoarseness, no difficulty swallowing,  no tinnitus and no new masses in head, oral cavity, or neck  Cardiovascular: No complaints of chest pain, no palpitations, no ankle edema     Respiratory: No complaints of shortness of breath, no cough  Gastrointestinal: No complaints of jaundice, no bloody stools, no pale stools  Genitourinary: No complaints of dysuria, no hematuria, no nocturia, no frequent urination, no urethral discharge  Musculoskeletal: No complaints of weakness, paralysis, joint stiffness or arthralgias  Integumentary: No complaints of rash, no new lesions  Neurological: No complaints of convulsions, no seizures, no dizziness  Hematologic/Lymphatic: No complaints of easy bruising  Endocrine:  No hot or cold intolerance  No polydipsia, polyphagia, or polyuria  Allergy/immunology:  No environmental allergies  No food allergies  Not immunocompromised  Skin:  No pallor or rash  No wound            Patient Active Problem List   Diagnosis    DDD (degenerative disc disease), lumbar    Chronic pancreatitis ([facility] )    Mixed hyperlipidemia    Allergic rhinitis    Impaired fasting glucose    Essential hypertension    Tobacco use    Osteoporosis    Reactive depression    Weight loss    Anemia    Drug-induced constipation    Anxiety    Spinal stenosis    Pancreatic adenocarcinoma ([facility] )    Encounter for chemotherapy management    Mild protein-calorie malnutrition ([facility] )    At high risk for infection due to chemotherapy    Port-A-Cath in place    Medical marijuana use    Cancer related pain    Palliative care patient    [facility]    Edema     Past Medical History:   Diagnosis Date    Anxiety     Chronic pancreatitis ([facility] )     Depression     Deviated nasal septum     H/O colonoscopy     6/7/10    Hyperlipidemia     Hypertension     Impaired fasting glucose     Spinal stenosis      Past Surgical History:   Procedure Laterality Date    APPENDECTOMY      CHOLECYSTECTOMY      EXPLORATORY LAPAROTOMY      IR PORT PLACEMENT  11/2/2021    TUBAL LIGATION       Family History   Problem Relation Age of Onset    Multiple myeloma Mother    Edgar Bones Coronary artery disease Father     Stroke Father         CVA     Social History     Socioeconomic History    Marital status:      Spouse name: Not on file    Number of children: 1    Years of education: Not on file    Highest education level: Not on file   Occupational History    Not on file   Tobacco Use    Smoking status: Current Every Day Smoker     Packs/day: 0 50     Types: Cigarettes    Smokeless tobacco: Never Used   Vaping Use    Vaping Use: Never used   Substance and Sexual Activity    Alcohol use: Never     Comment: wine with tea  a couple of times a week     Drug use: No    Sexual activity: Not Currently   Other Topics Concern    Not on file   Social History Narrative    Not on file     Social Determinants of Health     Financial Resource Strain: Not on file   Food Insecurity: No Food Insecurity    Worried About Running Out of Food in the Last Year: Never true    Rayna of Food in the Last Year: Never true   Transportation Needs: No Transportation Needs    Lack of Transportation (Medical): No    Lack of Transportation (Non-Medical): No   Physical Activity: Not on file   Stress: Not on file   Social Connections: Unknown    Frequency of Communication with Friends and Family: More than three times a week    Frequency of Social Gatherings with Friends and Family: More than three times a week    Attends Sabianism Services: Not on file   [employer]s or Organizations: Not on file    Attends Club or Organization Meetings: Not on file    Marital Status:     Intimate Partner Violence: Not on file   Housing Stability: Low Risk     Unable to Pay for Housing in the Last Year: No    Number of Places Lived in the Last Year: 1    Unstable Housing in the Last Year: No       Current Outpatient Medications:     acetaminophen (TYLENOL) 500 mg tablet, Take 1 tablet (500 mg total) by mouth every 6 (six) hours as needed for mild pain, Disp: 50 tablet, Rfl: 0    aspirin 325 mg tablet, Take 1 tablet (325 mg total) by mouth daily, Disp: , Rfl:     atorvastatin (LIPITOR) 20 mg tablet, Take 0 5 tablets (10 mg total) by mouth daily, Disp: 90 tablet, Rfl: 0    escitalopram (LEXAPRO) 20 mg tablet, Take 1 tablet (20 mg total) by mouth daily, Disp: 90 tablet, Rfl: 0    fluticasone (FLONASE) 50 mcg/act nasal spray, 2 sprays into each nostril daily, Disp: , Rfl:     furosemide (LASIX) 20 mg tablet, 1 bid prn edema, Disp: 60 tablet, Rfl: 5    gabapentin (Neurontin) 100 mg capsule, Take 1 capsule (100 mg total) by mouth daily at bedtime, Disp: 30 capsule, Rfl: 0     MG tablet, TAKE 1 TABLET (600 MG TOTAL) BY MOUTH 3 (THREE) TIMES A DAY, Disp: 270 tablet, Rfl: 3    ondansetron (ZOFRAN) 4 mg tablet, Take 1 tablet (4 mg total) by mouth every 8 (eight) hours as needed for nausea or vomiting, Disp: 50 tablet, Rfl: 2    oxyCODONE (ROXICODONE) 20 MG TABS, Take 0 5-1 tablets (10-20 mg total) by mouth every 4 (four) hours as needed for moderate pain Max Daily Amount: 120 mg, Disp: 90 tablet, Rfl: 0    pancrelipase, Lip-Prot-Amyl, (CREON) 12,000 units capsule, Take 12,000 units of lipase by mouth 3 (three) times a day with meals, Disp: 270 capsule, Rfl: 3    polyethylene glycol (MIRALAX) 17 g packet, Take 17 g by mouth daily as needed (Constipation), Disp: 20 each, Rfl: 0    prochlorperazine (COMPAZINE) 10 mg tablet, Take 1 tablet (10 mg total) by mouth every 6 (six) hours as needed for nausea or vomiting, Disp: 50 tablet, Rfl: 2    senna (SENOKOT) 8 6 MG tablet, Take 1 tablet (8 6 mg total) by mouth daily, Disp: 30 tablet, Rfl: 3    diltiazem (CARDIZEM CD) 120 mg 24 hr capsule, Take 1 capsule (120 mg total) by mouth daily (Patient not taking: Reported on 5/20/2022), Disp: 30 capsule, Rfl: 1    metoprolol tartrate (LOPRESSOR) 25 mg tablet, Take 1 tablet (25 mg total) by mouth every 12 (twelve) hours (Patient not taking: Reported on 5/20/2022), Disp: 60 tablet, Rfl: 1  Allergies Allergen Reactions    Morphine Abdominal Pain    Iv Contrast [Iodinated Diagnostic Agents] Palpitations     Vitals:    05/20/22 1258   BP: 120/60   Pulse: 70   Resp: 16   Temp: 98 3 °F (36 8 °C)   SpO2: 94%       Physical Exam   Constitutional: General appearance: The Patient is well-developed and well-nourished who appears the stated age in no acute distress  Patient is pleasant and talkative  HEENT:  Normocephalic  Sclerae are anicteric  Mucous membranes are moist  Neck is supple without adenopathy  No JVD  Chest: The lungs are clear to auscultation  Cardiac: Heart is regular rate  Abdomen: Abdomen is soft, non-tender, non-distended and without masses  Extremities: There is no clubbing or cyanosis  There is no edema  Symmetric  Neuro: Grossly nonfocal  Gait is normal      Lymphatic: No evidence of cervical adenopathy bilaterally  No evidence of axillary adenopathy bilaterally  No evidence of inguinal adenopathy bilaterally  Skin: Warm, anicteric  Psych:  Patient is pleasant and talkative  Breasts:      Pathology:  [unfilled]    Labs:      Imaging  NM PET CT skull base to mid thigh    Result Date: 5/16/2022  Narrative: PET/CT SCAN INDICATION:  C25 9: Malignant neoplasm of pancreas, unspecified C25 1: Malignant neoplasm of body of pancreas   , restaging postchemotherapy MODIFIER: PS COMPARISON: CT 4/1/2022 and priors CELL TYPE:  Adenocarcinoma, pancreatic body mass biopsy 10/31/2021 TECHNIQUE:   8 6 mCi F-18-FDG administered IV  Multiplanar attenuation corrected and non attenuation corrected PET images were acquired 60 minutes post injection  Contiguous, low dose, axial CT sections were obtained from the skull base through the femurs   Intravenous contrast material was not utilized    This examination, like all [facility], was performed utilizing techniques to minimize radiation dose exposure, including the use of iterative reconstruction and automated exposure control  Fasting serum glucose: 106 mg/dl FINDINGS: VISUALIZED BRAIN:   No acute abnormalities are seen  HEAD/NECK:   There is a physiologic distribution of FDG  No FDG avid cervical adenopathy is seen  CT images: Unremarkable  CHEST: 7 mm precarinal node image 3/95 demonstrates only mild FDG activity, SUV 1 9, favoring a reactive etiology  For comparison, mediastinal blood pool SUV measures 1 7  Prior CT measurement from 9/28/2021 was 7 mm  Otherwise no FDG avid soft tissue lesions are seen  CT images: Coronary atherosclerosis  Emphysema  Right chest wall port  ABDOMEN: Pancreatic body mass does not demonstrate significant FDG activity, compared to surrounding soft tissue activity, SUV measuring 1 9  For comparison, liver activity has an SUV of 2  However, it is uncertain if this is because of treated tumor versus low  FDG avidity of the primary tumor  No discrete FDG avid soft tissue lesions are seen  CT images: Ectatic abdominal aorta measuring 3 cm  Small amount of abdominal pelvic fluid  PELVIS: There is prominent FDG activity involving the sigmoid colon, SUV 7 3  This may be physiologic, though occult lesion is not excluded at this time  No additional FDG avid soft tissue lesions are seen  CT images: Stable  OSSEOUS STRUCTURES: No FDG avid lesions are seen  CT images: Stable  Impression: 1  Pancreatic body mass does not demonstrate significant FDG activity, compared to surrounding soft tissue activity, though uncertain if this is because of treated tumor versus low FDG avidity of the primary tumor  2   Prominent FDG activity involving the sigmoid colon may be physiologic, though occult lesion is not excluded at this time  Clinical correlation recommended  3   Otherwise there are no FDG avid lesions are concerning for malignancy/metastases  Workstation performed: [id number]     I reviewed the above laboratory and imaging data      Discussion/Summary: 82-year-old female with locally advanced pancreas cancer  I had a long discussion with the patient  Currently based on her PET-CT, she still appears to have major vessel involvement  I would not recommend surgical resection  I discussed that potential irreversible electroporation may be possible, but she should have an MRI prior to any intervention  I did discuss that I doubt the MRI would change what I am seeing on the PET-CT  We also discussed that the lesion still appears to be over 2 cm in size  She had several questions regarding surgery and even continuing chemotherapy  I did discuss with her that it appears that hospice has been brought up with the patient in the past   My recommendation to her was to either continue chemotherapy with full support from palliative care to transition to hospice since she is very focused on her quality of life, and she feels better off chemotherapy  I did discuss that irreversible electroporation could be performed, but this would not likely cure her despite some of the data that shows a 3 year survival benefit  Given 2 vessel involvement and the size of this lesion, I think the risks outweigh the benefits in this situation  She is going to discuss this further with her family as well as Dr Pasquale Vallejo from palliative Care  I will see her in the future should the need arise  She is agreeable to this  All her questions were answered

## 2022-05-23 NOTE — PROGRESS NOTES
Hematology/Oncology Progress Note    Date of Service: 5/23/2022    [address] [facility]AT[facility]   [address]  [facility] [place] [address]    Hem/Onc Problem List:     Pancreatic cancer on chemotherapy  Chief Complaint:    Routine follow-up for management of pancreatic cancer    Assessment/Plan:     I personally reviewed the lab results, image studies results and other specialties/physicians consult notes and recommendations  I shared the findings with patient and family, discussed the diagnosis and management plan as below  1  Locally advanced pancreatic cancer, unresectable disease  Patient started neoadjuvant chemotherapy with gemcitabine and Abraxane on November 8, 2021 with Neulasta support  Patient has had 6 cycle chemotherapy with dose dose reduction(64% of original dose for Abraxane, 80% of origin dose for gemcitabine)  Restaging CT scan on April 1, 2022 showed stable disease  CA 19-9 37 on April 7, 2022  PET-CT scan on May 16, 2022 showed persistent pancreatic body mass without significant FDG activity  Dr Amador Wiliknson consulted patient and reviewed the PET CT with patient and family  Patient is not a candidate for surgery due to vessel involvement  We discussed the treatment options including continuing current chemotherapy with reduced dose, possible radiation therapy, potential irreversible electroporation and comfort care/hospice  Patient and patient's son decided going for hospice care to focus on life quality  I will discontinue chemotherapy and refer patient to hospice for evaluation  2   Anemia with hemoglobin 10-11 likely due to chemotherapy  Hemoglobin stable  Patient is asymptomatic  Patient wants to continue monitoring by primary care physician  3  Patient developed AFib with RVR after last CT scan with contrast in January 18, 2022    Patient and  family thought it was triggered by oral contract, which is kind of rare scenario  Patient refused further CT scan with contrast   Normal image study is recommended if patient goes to hospice  4  Elevated alkaline phosphatase due to chemotherapy  Monitoring  5  Cancer associated pain that has been managed by palliative care  Patient continue oxycodone as needed  6  Follow-up, return to clinic as needed  Patient is aware that I will leave this practice in 1 month  Her care will be transferred to a new physician  Patient will call my office to make an appointment if she changes her mind or needs further assistance  Disclaimer: This document was prepared using [employer] technology  If a word or phrase is confusing, or does not make sense, this is likely due to recognition error which was not discovered during the providers review  If you believe an error has occurred, please Contact me through [employer] service for joe? cation  AJCC 8th Edition Cancer Stage :      Cancer Staging  No matching staging information was found for the patient  Hematology/Oncology History:   1  Locally advanced Pancreatic adenocarcinoma   - 10/2021: presented with weight loss, imaging study showed 5 cm pancreatic body mass   Cancer marker CA 19-9 = 171  Needle biopsy on 10/13/2021, results showed   suspicious for pancreatic adenocarcinoma   Cancer encasing SMV, not a candidate for surgery     - MRI did not show metastatic disease      - 11/2021:  Plan to start new adjuvant treatment  Gemcitabine 1000 mg / m2 /Abraxane 100 mg / m2 + onpro , day 1 day 15;  guardian test, no MSI high detected  - cycle 1 day 15 cancel due to elevation of alkaline phosphatase,?  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        - 3/2022: WBC elevated at 42K without infection  Likely Onpro related  Will hold onpro once after next treatment  · CA 19-9 on March 10, 2022-36  · March 24, 2022, CA 19-9 37   · Cycle 6 chemotherapy on March 28, 2022  · April 1, 2022 CT scan without contrast for restaging:  New 3 mm nodule in the right middle lobe  Pancreatic cancer is not well appreciated  · Cycle 6 chemotherapy on March 28, 2022  · May 16, 2022 PET-CT scan:    IMPRESSION:  1  Pancreatic body mass does not demonstrate significant FDG activity, compared to surrounding soft tissue activity, though uncertain if this is because of treated tumor versus low FDG avidity of the primary tumor  2   Prominent FDG activity involving the sigmoid colon may be physiologic, though occult lesion is not excluded at this time  Clinical correlation recommended  3   Otherwise there are no FDG avid lesions are concerning for malignancy/metastases  · May 23, 2022 patient requested hospice care      2   Mother had history of multiple myeloma, maternal grandmother history of breast cancer          Oncology History   Pancreatic adenocarcinoma ([facility] [address] )   10/25/2021 Initial Diagnosis     Pancreatic adenocarcinoma ([facility] [address] )      11/8/2021 -  Chemotherapy     pegfilgrastim (Colleen Milch), 6 mg, Subcutaneous, Once, 4 of 5 cycles  Administration: 6 mg (11/8/2021), 6 mg (12/6/2021), 6 mg (1/31/2022), 6 mg (2/14/2022)  paclitaxel protein-bound (ABRAXANE) IVPB, 100 mg/m2 = 157 mg (80 % of original dose 125 mg/m2), Intravenous, Once, 5 of 6 cycles  Dose modification: 100 mg/m2 (original dose 125 mg/m2, Cycle 1, Reason: Performance Status), 80 mg/m2 (original dose 125 mg/m2, Cycle 3, Reason: Performance Status)  Administration: 157 mg (11/8/2021), 157 mg (12/6/2021), 157 mg (12/20/2021), 126 mg (1/3/2022), 126 mg (1/17/2022), 126 mg (1/31/2022), 126 mg (2/14/2022), 126 mg (2/28/2022)  gemcitabine (GEMZAR) infusion, 1,000 mg/m2 = 1,570 mg, Intravenous, Once, 5 of 6 cycles  Dose modification: 800 mg/m2 (original dose 1,000 mg/m2, Cycle 3, Reason: Performance Status)  Administration: 1,570 mg (11/8/2021), 1,570 mg (12/6/2021), 1,570 mg (12/20/2021), 1,255 9 mg (1/3/2022), 1,255 9 mg (1/17/2022), 1,255 9 mg (1/31/2022), 1,255 9 mg (2/14/2022), 1,255 9 mg (2/28/2022)        · May 23, 2022 discontinue chemotherapy  History of Present Illiness:   Leonard Roman is a 80 y o  female with the above-noted HemOnc history who is here  to discuss the PET/CT scan result and management plan  Patient has unresectable locally advanced pancreatic cancer on neoadjuvant chemotherapy with Abraxane and gemcitabine started on November 8, 2021 with Neulasta support  Patient has had 6 cycles chemotherapy with dose reduction at 64% of Abraxane original does, 80% of gemcitabine original does  CT scan without contrast on April 1, 2022 showed stable disease  CA 19-9 decreased to 37  IV and p o  contrast was not given because of questionable allergy reaction before  Patient had a hard time after last cycle of chemotherapy because of generalized weakness and fatigue  PET-CT scan in May 16, 2022 showed persistent pancreatic body mass without significant FDG activity  Dr Loyd Underwood consulted patient and does not think patient is a surgical candidate  Irreversible electroporation was discussed  Patient takes oxycodone for abdominal pain under the care of palliative team   Patient does not want any more chemotherapy  Patient and his son would like patient to be comfortable  Patient feels better after discontinue chemotherapy  No fever or chills  No exertional chest pain, diaphoresis or shortness  of breath  No cough and phlegm, no hemoptysis    Patient denied nausea  and vomiting  She complained of abdominal pain  No diarrhea or constipation  No  symptoms  No headache or blurred vision  No seizure activity  Appetite is improving  No significant weight loss or weight gain  The patient denies bleeding anywhere  ROS: A 12-point of review of systems is obtained and other than the above is noncontributory  Objective:   VITALS:   /62 (BP Location: Left arm, Patient Position: Sitting, Cuff Size: Adult)   Pulse 67   Temp 97 9 °F (36 6 °C)   Resp 16   Ht 5' 5" (1 651 m)   Wt 48 5 kg (107 lb)   SpO2 96%   BMI 17 81 kg/m²     Physical EXAM:  General:  Alert, cooperative, no distress  Head:  Normocephalic, without obvious abnormality  Eyes:  Conjunctivae/corneas clear  PERRL, EOMs intact  No evidence of conjunctivitis     Throat: Lips, mucosa, and tongue normal   No bleeding from mouth  Neck: Supple, symmetrical    Lungs:   Clear to auscultation bilaterally  Respiratory effort easy, nonlabored    Heart:  Regular rate and rhythm, S1, S2 normal, no murmur  Abdomen:   Soft, +tender,nondistended  Bowel sounds normal  No masses  Extremities:  Lymphatics: Extremities normal, atraumatic, no cyanosis or edema  No cervical, axillary or inguinal adenopathy   Skin: No rashes  Neurologic: A&Ox4   No focal neuro deficits       Allergies   Allergen Reactions    Morphine Abdominal Pain    Iv Contrast [Iodinated Diagnostic Agents] Palpitations       Past Medical History:   Diagnosis Date    Anxiety     Chronic pancreatitis ([address] )     Depression     Deviated nasal septum     H/O colonoscopy     6/7/10    Hyperlipidemia     Hypertension     Impaired fasting glucose     Spinal stenosis        Past Surgical History:   Procedure Laterality Date    APPENDECTOMY      CHOLECYSTECTOMY      EXPLORATORY LAPAROTOMY      IR PORT PLACEMENT  11/2/2021    TUBAL LIGATION         Family History   Problem Relation Age of Onset    Multiple myeloma Mother  Coronary artery disease Father     Stroke Father         CVA       Social History     Socioeconomic History    Marital status:      Spouse name: Not on file    Number of children: 1    Years of education: Not on file    Highest education level: Not on file   Occupational History    Not on file   Tobacco Use    Smoking status: Current Every Day Smoker     Packs/day: 0 50     Types: Cigarettes    Smokeless tobacco: Never Used   Vaping Use    Vaping Use: Never used   Substance and Sexual Activity    Alcohol use: Never     Comment: wine with tea  a couple of times a week     Drug use: No    Sexual activity: Not Currently   Other Topics Concern    Not on file   Social History Narrative    Not on file     Social Determinants of Health     Financial Resource Strain: Not on file   Food Insecurity: No Food Insecurity    Worried About Running Out of Food in the Last Year: Never true    Rayna of Food in the Last Year: Never true   Transportation Needs: No Transportation Needs    Lack of Transportation (Medical): No    Lack of Transportation (Non-Medical): No   Physical Activity: Not on file   Stress: Not on file   Social Connections: Unknown    Frequency of Communication with Friends and Family: More than three times a week    Frequency of Social Gatherings with Friends and Family: More than three times a week    Attends Buddhist Services: Not on file   [employer]s or Organizations: Not on file    Attends Club or Organization Meetings: Not on file    Marital Status:     Intimate Partner Violence: Not on file   Housing Stability: Low Risk     Unable to Pay for Housing in the Last Year: No    Number of Places Lived in the Last Year: 1    Unstable Housing in the Last Year: No       Current Outpatient Medications   Medication Sig Dispense Refill    acetaminophen (TYLENOL) 500 mg tablet Take 1 tablet (500 mg total) by mouth every 6 (six) hours as needed for mild pain 50 tablet 0    aspirin 325 mg tablet Take 1 tablet (325 mg total) by mouth daily      atorvastatin (LIPITOR) 20 mg tablet Take 0 5 tablets (10 mg total) by mouth daily 90 tablet 0    diltiazem (CARDIZEM CD) 120 mg 24 hr capsule Take 1 capsule (120 mg total) by mouth daily (Patient not taking: Reported on 5/20/2022) 30 capsule 1    escitalopram (LEXAPRO) 20 mg tablet Take 1 tablet (20 mg total) by mouth daily 90 tablet 0    fluticasone (FLONASE) 50 mcg/act nasal spray 2 sprays into each nostril daily      furosemide (LASIX) 20 mg tablet 1 bid prn edema 60 tablet 5    gabapentin (Neurontin) 100 mg capsule Take 1 capsule (100 mg total) by mouth daily at bedtime 30 capsule 0     MG tablet TAKE 1 TABLET (600 MG TOTAL) BY MOUTH 3 (THREE) TIMES A  tablet 3    metoprolol tartrate (LOPRESSOR) 25 mg tablet Take 1 tablet (25 mg total) by mouth every 12 (twelve) hours (Patient not taking: Reported on 5/20/2022) 60 tablet 1    ondansetron (ZOFRAN) 4 mg tablet Take 1 tablet (4 mg total) by mouth every 8 (eight) hours as needed for nausea or vomiting 50 tablet 2    oxyCODONE (ROXICODONE) 20 MG TABS Take 0 5-1 tablets (10-20 mg total) by mouth every 4 (four) hours as needed for moderate pain Max Daily Amount: 120 mg 90 tablet 0    pancrelipase, Lip-Prot-Amyl, (CREON) 12,000 units capsule Take 12,000 units of lipase by mouth 3 (three) times a day with meals 270 capsule 3    polyethylene glycol (MIRALAX) 17 g packet Take 17 g by mouth daily as needed (Constipation) 20 each 0    prochlorperazine (COMPAZINE) 10 mg tablet Take 1 tablet (10 mg total) by mouth every 6 (six) hours as needed for nausea or vomiting 50 tablet 2    senna (SENOKOT) 8 6 MG tablet Take 1 tablet (8 6 mg total) by mouth daily 30 tablet 3     No current facility-administered medications for this visit         (Not in a hospital admission)      DATA REVIEW:    Pathology Result:    Final Diagnosis   Date Value Ref Range Status   10/13/2021 Final    A B C  Pancreas, pancreatic body mass (ThinPrep, smears and cell block preparations ):       -  Malignant  (PSC Category VI) -See note  -  Adenocarcinoma  Satisfactory for evaluation  Note:   - The case was sent to Dr Lary Maldonado For an expert consultation upon Dr Umrila Miranda's request    - The amended diagnosis is discussed with Dr Mark Donohue via [facility] Text  at 1:22 pm on 10/29/21, who finds no clinical impact on patient care  Image Results: They are reviewed and documented in Hematology/Oncology history    NM PET CT skull base to mid thigh  Narrative: PET/CT SCAN    INDICATION:  C25 9: Malignant neoplasm of pancreas, unspecified  C25 1: Malignant neoplasm of body of pancreas   , restaging postchemotherapy    MODIFIER: PS     COMPARISON: CT 4/1/2022 and priors    CELL TYPE:  Adenocarcinoma, pancreatic body mass biopsy 10/31/2021    TECHNIQUE:   8 6 mCi F-18-FDG administered IV  Multiplanar attenuation corrected and non attenuation corrected PET images were acquired 60 minutes post injection  Contiguous, low dose, axial CT sections were obtained from the skull base through the   femurs   Intravenous contrast material was not utilized  This examination, like all CT scans performed in the [facility], was performed utilizing techniques to minimize radiation dose exposure, including the use of iterative   reconstruction and automated exposure control  Fasting serum glucose: 106 mg/dl    FINDINGS:     VISUALIZED BRAIN:     No acute abnormalities are seen  HEAD/NECK:     There is a physiologic distribution of FDG  No FDG avid cervical adenopathy is seen  CT images: Unremarkable  CHEST:  7 mm precarinal node image 3/95 demonstrates only mild FDG activity, SUV 1 9, favoring a reactive etiology  For comparison, mediastinal blood pool SUV measures 1 7  Prior CT measurement from 9/28/2021 was 7 mm  Otherwise no FDG avid soft tissue lesions are seen    CT images: Coronary atherosclerosis  Emphysema  Right chest wall port  ABDOMEN:  Pancreatic body mass does not demonstrate significant FDG activity, compared to surrounding soft tissue activity, SUV measuring 1 9  For comparison, liver activity has an SUV of 2  However, it is uncertain if this is because of treated tumor versus low   FDG avidity of the primary tumor  No discrete FDG avid soft tissue lesions are seen  CT images: Ectatic abdominal aorta measuring 3 cm  Small amount of abdominal pelvic fluid  PELVIS:  There is prominent FDG activity involving the sigmoid colon, SUV 7 3  This may be physiologic, though occult lesion is not excluded at this time  No additional FDG avid soft tissue lesions are seen  CT images: Stable  OSSEOUS STRUCTURES:  No FDG avid lesions are seen  CT images: Stable  Impression: 1  Pancreatic body mass does not demonstrate significant FDG activity, compared to surrounding soft tissue activity, though uncertain if this is because of treated tumor versus low FDG avidity of the primary tumor  2   Prominent FDG activity involving the sigmoid colon may be physiologic, though occult lesion is not excluded at this time  Clinical correlation recommended  3   Otherwise there are no FDG avid lesions are concerning for malignancy/metastases  Workstation performed: [id number]        LABS:  Lab data are reviewed and documented in HemOnc history  No results found for this or any previous visit (from the past 48 hour(s))            Annie Childs MD  5/23/2022, 11:05 AM

## 2022-05-23 NOTE — TELEPHONE ENCOUNTER
I did not call patient, this is for documentation and scheduling purposes only  Patient saw Dr Marcel Daniels today (5/23/22) after appointment per Dr Marcel Daniels review patient would like to go hospice and stop all treatments  Please cancel all infusion appointments

## 2022-05-23 NOTE — CASE COMMUNICATION
Andrés Mohanesteladebra [place] [birth year] is a 79 y/o female with locally advanced unresectable pancreatic cancer  Pt started chemotherapy November 2021 April 7, 2022  PET-CT scan on May 16, 2022 showed persistent pancreatic body mass without significant FDG activity  Patient is not a candidate for surgery due to vessel involvement  Pt has cancer related pain managed with oxycodone by pallative  Patient does not want any more chemotherapy  Patient and  his son would like patient to be comfortable   RLOC

## 2022-05-31 NOTE — TELEPHONE ENCOUNTER
Primary palliative medicine provider: Rupal Bryant    Medication requested: oxycodone 20 mg     If for pain, how has the patient been taking their pain medicine?      Last appointment:  4 21    Next scheduled appointment: 6 27    PDMP review:      Last filled 5 17  90/15     note has not yet signed on to hospice

## 2022-06-01 NOTE — TELEPHONE ENCOUNTER
Patient's  son called to say [facility] is closed  Asked to have Oxycodone prescription sent to the [facility] on [address]    In profile

## 2022-06-06 PROBLEM — D69.6 PLATELETS DECREASED (HCC): Status: ACTIVE | Noted: 2022-01-01

## 2022-06-06 PROBLEM — R73.01 IMPAIRED FASTING GLUCOSE: Status: RESOLVED | Noted: 2018-03-20 | Resolved: 2022-01-01

## 2022-06-06 PROBLEM — F33.9 DEPRESSION, RECURRENT (HCC): Status: ACTIVE | Noted: 2022-01-01

## 2022-06-06 NOTE — TELEPHONE ENCOUNTER
T/c from [facility], the patient rep with [facility]  Pt is requesting Dr Fatuma Edward oversee her hospice care and Tucker Nunez needs Dr Fatuma Edward' verbal ok      [facility]: [phone]

## 2022-06-06 NOTE — PROGRESS NOTES
Assessment/Plan:    Follow-up will be through hospice     Problem List Items Addressed This Visit        Digestive    Pancreatic adenocarcinoma [facility]) - Primary       Cardiovascular and Mediastinum    Atrial fibrillation with RVR (HCC)     Continue aspirin 3 25 mg daily              Musculoskeletal and Integument    DDD (degenerative disc disease), lumbar    Relevant Medications    oxyCODONE (ROXICODONE) 30 MG immediate release tablet    ibuprofen (IBU) 600 mg tablet       Other    Mixed hyperlipidemia    Reactive depression    Relevant Medications    escitalopram (LEXAPRO) 20 mg tablet    Cancer related pain    Edema     Continue Lasix 20 mg as needed           Depression, recurrent (HCC)    Relevant Medications    escitalopram (LEXAPRO) 20 mg tablet    Platelets decreased (HCC)            Subjective:      Patient ID: Laura Tapia is a 80 y o  female  Patient is brought in by her son  She has elected stop chemotherapy for her pancreatic cancer and will be meeting with hospice tomorrow  The following portions of the patient's history were reviewed and updated as appropriate:   Past Medical History:  She has a past medical history of Anxiety, Chronic pancreatitis ([address] ), Depression, Deviated nasal septum, H/O colonoscopy, Hyperlipidemia, Hypertension, Impaired fasting glucose, and Spinal stenosis  ,  _______________________________________________________________________  Medical Problems:  does not have any pertinent problems on file ,  _______________________________________________________________________  Past Surgical History:   has a past surgical history that includes Appendectomy; Cholecystectomy; Exploratory laparotomy; Tubal ligation; and IR port placement (11/2/2021)  ,  _______________________________________________________________________  Family History:  family history includes Coronary artery disease in her father; Multiple myeloma in her mother; Stroke in her father ,  _______________________________________________________________________  Social History:   reports that she has been smoking cigarettes  She has been smoking about 0 50 packs per day  She has never used smokeless tobacco  She reports that she does not drink alcohol and does not use drugs  ,  _______________________________________________________________________  Allergies:  is allergic to morphine and iv contrast [iodinated diagnostic agents]     _______________________________________________________________________  Current Outpatient Medications   Medication Sig Dispense Refill    acetaminophen (TYLENOL) 500 mg tablet Take 1 tablet (500 mg total) by mouth every 6 (six) hours as needed for mild pain 50 tablet 0    aspirin 325 mg tablet Take 1 tablet (325 mg total) by mouth daily      atorvastatin (LIPITOR) 20 mg tablet Take 0 5 tablets (10 mg total) by mouth daily 90 tablet 0    escitalopram (LEXAPRO) 20 mg tablet Take 1 tablet (20 mg total) by mouth daily 90 tablet 3    fluticasone (FLONASE) 50 mcg/act nasal spray 2 sprays into each nostril daily      furosemide (LASIX) 20 mg tablet 1 bid prn edema 60 tablet 5    gabapentin (Neurontin) 100 mg capsule Take 1 capsule (100 mg total) by mouth daily at bedtime 30 capsule 0    ibuprofen (IBU) 600 mg tablet Take 1 tablet (600 mg total) by mouth 3 (three) times a day 270 tablet 3    ondansetron (ZOFRAN) 4 mg tablet Take 1 tablet (4 mg total) by mouth every 8 (eight) hours as needed for nausea or vomiting 50 tablet 2    oxyCODONE (ROXICODONE) 30 MG immediate release tablet Take 1 tablet (30 mg total) by mouth every 4 (four) hours as needed for moderate pain Max Daily Amount: 180 mg 180 tablet 0    polyethylene glycol (MIRALAX) 17 g packet Take 17 g by mouth daily as needed (Constipation) (Patient taking differently: Take 17 g by mouth as needed (Constipation)) 20 each 0    prochlorperazine (COMPAZINE) 10 mg tablet Take 1 tablet (10 mg total) by mouth every 6 (six) hours as needed for nausea or vomiting 50 tablet 2    senna (SENOKOT) 8 6 MG tablet Take 1 tablet (8 6 mg total) by mouth daily 30 tablet 3    pancrelipase, Lip-Prot-Amyl, (CREON) 12,000 units capsule Take 12,000 units of lipase by mouth 3 (three) times a day with meals 270 capsule 3     No current facility-administered medications for this visit      _______________________________________________________________________  Review of Systems   Gastrointestinal: Positive for abdominal pain  Musculoskeletal: Positive for back pain  Neurological: Positive for weakness  Objective:  Vitals:    06/06/22 1324   BP: 116/60   BP Location: Left arm   Patient Position: Sitting   Cuff Size: Child   Temp: 98 3 °F (36 8 °C)   Weight: 44 kg (97 lb)   Height: 5' 5" (1 651 m)     Body mass index is 16 14 kg/m²  Physical Exam  Constitutional:       Appearance: Normal appearance  She is well-developed  HENT:      Head: Normocephalic and atraumatic  Eyes:      Pupils: Pupils are equal, round, and reactive to light  Neck:      Thyroid: No thyromegaly  Cardiovascular:      Rate and Rhythm: Normal rate and regular rhythm  Heart sounds: Normal heart sounds  Pulmonary:      Effort: Pulmonary effort is normal       Breath sounds: Normal breath sounds  Abdominal:      Palpations: Abdomen is soft  Musculoskeletal:         General: Normal range of motion  Cervical back: Neck supple  Lymphadenopathy:      Cervical: No cervical adenopathy  Skin:     General: Skin is warm and dry  Neurological:      Mental Status: She is alert     Psychiatric:         Mood and Affect: Mood normal          Behavior: Behavior normal

## 2022-06-08 NOTE — HOSPICE
Ordered for next day delivery:  -comfort pack (to keep in fridge unopened)  -lasix  -81 mg chewable aspirin  -diltiazem cd 120 mg  -dexamethasone  -senna s    To discontinue:  -atorvastatin  -tylenol  -vitamin E  -zinc  -senna 8 6  -ibuprofen    To start:  -diltiazem  mg cap; 1 daily  -senna s (8 6-50); 1 daily    Plan to revisit 06 10 2022  Will contact beforehand

## 2022-06-21 NOTE — HOSPICE
Reordered for next day delivery:    -omeprazole  -dexamethasone  -diltiazem  -asa 81  -senna s new fill

## 2022-07-29 NOTE — DISCHARGE INSTRUCTIONS
Ice to area of swelling  Continue pain Medication  May help to wrap the legs together to prevent movement of the left hip    No weight-bearing  Follow-up as per your provider

## 2022-07-29 NOTE — ED NOTES
Attempt made to give patient PO Cardizem  Refusing to take medications, states "I am not taking any medications until I see the doctor now "   Patient states "I want water now  I am not doing anything until I get home "   Patient appears to be altered  Will not take medications  For discharge, will send patient home when transport arrives          Miesha Rodriges RN  07/29/22 9448

## 2022-07-29 NOTE — ED NOTES
[facility] called for patient to be picked up from ED  Will call back with time that patient will be picked up  Resting on stretcher at this time, no acute distress          Sarina Grewal RN  07/29/22 4825

## 2022-07-29 NOTE — ED NOTES
Patient attempting to get out of the bed  Bed alarm placed under patient          Lorie Rushing RN  07/29/22 2917

## 2022-07-29 NOTE — ED NOTES
Assumed care of patient at this time   As per report patient is scheduled for pickup to return home at [address] [place] [address], [address] [place] [address]  07/29/22 3800

## 2022-07-29 NOTE — ED NOTES
Per [facility] transport back to Patient residence at [address]  Primary RN made aware       Gail Newberry RN  07/29/22 0894

## 2022-07-29 NOTE — ED PROVIDER NOTES
History  Chief Complaint   Patient presents with    Fall     1145 patient tripped over car in living room, complaints of left hip pain  Shortened and rotated  HPI I spoke with the hospice worker prior to the patient's arrival   Patient is a 79-year-old female currently on hospice for metastatic pancreatic cancer  Patient presents emergency department after a fall  Apparently she was walking and tripped complaining of pain in her left hip  Patient currently on methadone and oxycodone for pain at home due to her pancreatic cancer  Patient denies any other injury  Denies any head neck chest or abdominal injury  Patient complains primarily pain in her left hip and requested an x-ray  Hospice reports that the patient wanted an x-ray and therefore wanted to come to the emergency department  Past medical history pancreatic cancer on methadone and oxycodone hyperlipidemia hypertension  Family history noncontributory  Social history on home hospice, smoker, no history of drug abuse    Prior to Admission Medications   Prescriptions Last Dose Informant Patient Reported? Taking? LORazepam (Ativan) 0 5 mg tablet   Yes No   Sig: Take 0 5 mg by mouth every 6 (six) hours as needed for anxiety (restlessness)  Indications: Feeling Anxious, restlessness   Potassium 99 MG TABS   Yes No   Sig: Take 99 mg by mouth daily  Exhaust patient supply then switch to formulary  Indications: deficiency   ascorbic acid (VITAMIN C) 1000 MG tablet   Yes No   Sig: Take 2,000 mg by mouth daily  Indications: Inadequate Vitamin C   aspirin (Aspirin Low Strength) 81 mg chewable tablet   Yes No   Sig: Chew 81 mg daily  Indications: Heart Attack Affecting Area Damaged by Previous Attack   citalopram (CeleXA) 20 mg tablet   Yes No   Sig: Take 20 mg by mouth daily   Indications: Depression   dexamethasone (DECADRON) 2 mg tablet   Yes No   Sig: Take 2 mg by mouth daily with breakfast  Indications: General Weight Loss and Wasting   diltiazem (Cardizem CD) 120 mg 24 hr capsule   Yes No   Sig: Take 120 mg by mouth daily  Indications: Atrial Fibrillation   escitalopram (LEXAPRO) 20 mg tablet   No No   Sig: Take 1 tablet (20 mg total) by mouth daily   Patient taking differently: Take 1 tablet by mouth daily  Exhaust patient supply then switch to formulary  Indications: Major Depressive Disorder   furosemide (LASIX) 20 mg tablet  Self No No   Si bid prn edema   gabapentin (Neurontin) 100 mg capsule  Self No No   Sig: Take 1 capsule (100 mg total) by mouth daily at bedtime   haloperidol (HALDOL) 1 mg/0 5 mL oral concentrated solution   Yes No   Sig: Take 1 mg by mouth every 6 (six) hours as needed for agitation (nausea and vomiting)  Indications: Problems with Behavior, Nausea and Vomiting   hyoscyamine (LEVSIN/SL) 0 125 mg SL tablet   Yes No   Sig: Take 0 125 mg by mouth every 4 (four) hours as needed (excess secretions)  Indications: excess secretions   magnesium hydroxide (Milk of Magnesia) 400 mg/5 mL oral suspension   Yes No   Sig: Take 30 mL by mouth daily as needed for constipation  Indications: constipation   methadone (DOLOPHINE) 10 mg tablet   Yes No   Sig: Take 10 mg by mouth 2 (two) times a day before breakfast and lunch  take 10 mg in am and 10 mg midday  Indications: Moderate to Severe Pain   methadone (DOLOPHINE) 10 mg tablet   Yes No   Sig: Take 15 mg by mouth daily at bedtime  Indications: Moderate to Severe Pain   omeprazole (PriLOSEC) 20 mg delayed release capsule   Yes No   Sig: Take 20 mg by mouth daily   Indications: Heartburn   ondansetron (ZOFRAN) 4 mg tablet  Self No No   Sig: Take 1 tablet (4 mg total) by mouth every 8 (eight) hours as needed for nausea or vomiting   oxyCODONE (ROXICODONE) 30 MG immediate release tablet   No No   Sig: Take 1 tablet (30 mg total) by mouth every 4 (four) hours as needed for moderate pain Max Daily Amount: 180 mg   pancrelipase, Lip-Prot-Amyl, (CREON) 12,000 units capsule  Self No No   Sig: Take 12,000 units of lipase by mouth 3 (three) times a day with meals   prochlorperazine (COMPAZINE) 10 mg tablet  Self No No   Sig: Take 1 tablet (10 mg total) by mouth every 6 (six) hours as needed for nausea or vomiting   senna-docusate sodium (SENOKOT S) 8 6-50 mg per tablet   Yes No   Sig: Take 2 tablets by mouth 2 (two) times a day  Indications: Constipation      Facility-Administered Medications: None       Past Medical History:   Diagnosis Date    Anxiety     [facility] [address] [address] )     Depression     Deviated nasal septum     H/O colonoscopy     6/7/10    Hyperlipidemia     Hypertension     Impaired fasting glucose     Spinal stenosis        Past Surgical History:   Procedure Laterality Date    APPENDECTOMY      CHOLECYSTECTOMY      EXPLORATORY LAPAROTOMY      IR PORT PLACEMENT  11/2/2021    TUBAL LIGATION         Family History   Problem Relation Age of Onset    Multiple myeloma Mother     Coronary artery disease Father     Stroke Father         CVA     I have reviewed and agree with the history as documented  E-Cigarette/Vaping    E-Cigarette Use Never User      E-Cigarette/Vaping Substances    Nicotine No     THC No     CBD No     Flavoring No     Other No     Unknown No      Social History     Tobacco Use    Smoking status: Current Every Day Smoker     Packs/day: 0 50     Types: Cigarettes    Smokeless tobacco: Never Used   Vaping Use    Vaping Use: Never used   Substance Use Topics    Alcohol use: Never     Comment: wine with tea  a couple of times a week     Drug use: No       Review of Systems   Constitutional: Negative for diaphoresis, fatigue and fever  HENT: Negative for congestion, ear pain, nosebleeds and sore throat  Eyes: Negative for photophobia, pain, discharge and visual disturbance  Respiratory: Negative for cough, choking, chest tightness, shortness of breath and wheezing  Cardiovascular: Negative for chest pain and palpitations     Gastrointestinal: Negative for abdominal distention, abdominal pain, diarrhea and vomiting  Genitourinary: Negative for dysuria, flank pain and frequency  Musculoskeletal: Negative for back pain, gait problem and joint swelling  Skin: Negative for color change and rash  Neurological: Negative for dizziness, syncope and headaches  Psychiatric/Behavioral: Negative for behavioral problems and confusion  The patient is not nervous/anxious  All other systems reviewed and are negative  Left hip pain    Physical Exam  Physical Exam  Vitals and nursing note reviewed  Constitutional:       Appearance: She is well-developed  HENT:      Head: Normocephalic  Right Ear: External ear normal       Left Ear: External ear normal       Nose: Nose normal    Eyes:      General: Lids are normal       Pupils: Pupils are equal, round, and reactive to light  Cardiovascular:      Rate and Rhythm: Tachycardia present  Rhythm irregular  Comments: Chronic atrial fibrillation  Pulmonary:      Effort: Pulmonary effort is normal  No respiratory distress  Breath sounds: Normal breath sounds  Abdominal:      General: Abdomen is flat  Bowel sounds are normal    Musculoskeletal:         General: No deformity  Cervical back: Normal range of motion and neck supple  Comments: No obvious deformity there is tenderness of the left hip, decreased range of motion secondary to pain, distal neurovascular tendon intact  Skin:     General: Skin is warm and dry  Neurological:      Mental Status: She is alert and oriented to person, place, and time           Vital Signs  ED Triage Vitals   Temperature Pulse Respirations Blood Pressure SpO2   07/29/22 1517 07/29/22 1502 07/29/22 1502 07/29/22 1502 07/29/22 1502   98 9 °F (37 2 °C) (!) 147 18 145/83 97 %      Temp Source Heart Rate Source Patient Position - Orthostatic VS BP Location FiO2 (%)   07/29/22 1502 07/29/22 1502 07/29/22 1502 07/29/22 1502 --   Oral Monitor Lying Right arm Pain Score       --                  Vitals:    07/29/22 1502 07/29/22 1506   BP: 145/83    Pulse: (!) 147 (!) 124   Patient Position - Orthostatic VS: Lying          Visual Acuity      ED Medications  Medications   methadone (DOLOPHINE) tablet 10 mg (10 mg Oral Given 7/29/22 1546)       Diagnostic Studies  Results Reviewed     None                 XR hip/pelv 2-3 vws left if performed   Final Result by Rosangela Aguilar MD (07/29 1557)      No acute osseous abnormality  Workstation performed: [id number]                    Procedures  ECG 12 Lead Documentation Only    Date/Time: 7/29/2022 4:38 PM  Performed by: Electa Kehr, MD  Authorized by: Electa Kehr, MD     Indications / Diagnosis:  Hip injury  ECG reviewed by me, the ED Provider: yes    Patient location:  ED  Previous ECG:     Previous ECG:  Compared to current    Comparison ECG info:  January 21, 2022    Similarity:  Changes noted  Interpretation:     Interpretation: non-specific    Rate:     ECG rate:  One hundred thirty-eight    ECG rate assessment: tachycardic    Rhythm:     Rhythm: atrial fibrillation    Comments:      Rapid atrial fibrillation, nonspecific ST-T wave changes, hospice patient             ED Course              x-ray left hip   read by radiology is negative but the believe the patient has a nondisplaced intertrochanteric fracture, seen on only 1 film, interpreted by me I was initial   Spoke with Orthopedics, at this point benefits of surgery would be minimal and this type of fracture can be repaired a time  Discussed with patient and her son currently on hospice, I feel that having a surgery at this point would only increase her pain and make the remaining time she has even more painful  They agree  patient appears to be in rapid atrial fibrillation known history of atrial fibrillation, I attempt to give the patient Cardizem but she refuses  She refused any additional analgesics    I will discontinue the cardiac monitor at this time  MDM medical decision making 82-year-old female with pancreatic cancer on hospice, presents emergency department after a fall, x-ray I believe shows a nondisplaced intertrochanteric fracture  At this point surgery would not benefit the patient  Discussed with patient and her son  Patient will return home on hospice  Disposition  Final diagnoses:   Intertrochanteric fracture of left hip ([address] )     Time reflects when diagnosis was documented in both MDM as applicable and the Disposition within this note     Time User Action Codes Description Comment    7/29/2022  3:59 PM Misa Side Add [Y23 140T] Intertrochanteric fracture of left hip [facility])       ED Disposition     ED Disposition   Discharge    Condition   Stable    Date/Time   Fri Jul 29, 2022  3:59 PM    Comment   Eugenio Soriano discharge to home/self care  Follow-up Information    None         Patient's Medications   Discharge Prescriptions    No medications on file       No discharge procedures on file      PDMP Review       Value Time User    PDMP Reviewed  Yes 6/1/2022  4:05 PM Damian Rutherford MD          ED Provider  Electronically Signed by           Kathryn Mckay MD  07/29/22 4230       Kathryn Mckay MD  07/29/22 4456

## 2022-07-29 NOTE — ED PROVIDER NOTES
Pt signed out to me by Dr Jessica Hauser  A/w transport home  On hospice at home, fell with hip pain, nondisplaced L intertroch fx  Declines admission/operative intervention  Also noted to be in rapid afib, refuses rate-control agents  Has analgesia at home w oxy 30mg and methadone        Clarita Lazcano MD  07/29/22 9931

## 2022-08-01 NOTE — PROGRESS NOTES
8/1/2022 2:24 PM  [facility] home patient requests emergency fill until [employer] order arrives  Filled electronically via Epic as per [place] State Law  Requested Prescriptions     Signed Prescriptions Disp Refills    oxyCODONE (ROXICODONE) 10 MG TABS 20 tablet 0     Sig: Take 1 tablet (10 mg total) by mouth every 4 (four) hours as needed (pain / SOB) Take with one 30mg tab for a total of 40mg every 4 hours as needed for pain / SOB    LORazepam (ATIVAN) 1 mg tablet 16 tablet 0     Sig: Take 1 tablet (1 mg total) by mouth every 8 (eight) hours  May also take 1 tablet (1 mg total) every 2 (two) hours as needed for anxiety (restlessness / SOB)  Marianna Cope, [address] [facility]  Hospice Answering Service: [phone]  You can find me on [employer]!